# Patient Record
Sex: FEMALE | Race: WHITE | HISPANIC OR LATINO | Employment: OTHER | URBAN - METROPOLITAN AREA
[De-identification: names, ages, dates, MRNs, and addresses within clinical notes are randomized per-mention and may not be internally consistent; named-entity substitution may affect disease eponyms.]

---

## 2019-12-16 ENCOUNTER — TELEPHONE (OUTPATIENT)
Dept: OBGYN CLINIC | Facility: HOSPITAL | Age: 44
End: 2019-12-16

## 2019-12-16 NOTE — TELEPHONE ENCOUNTER
Patient is calling to find out if we received her [facility] referral  Patient is calling to find out if we got the referral but I was told that someone in [facility] check in would call her back     [phone]

## 2019-12-18 ENCOUNTER — OFFICE VISIT (OUTPATIENT)
Dept: OBGYN CLINIC | Facility: CLINIC | Age: 44
End: 2019-12-18
Payer: COMMERCIAL

## 2019-12-18 VITALS — HEART RATE: 106 BPM | DIASTOLIC BLOOD PRESSURE: 103 MMHG | SYSTOLIC BLOOD PRESSURE: 160 MMHG

## 2019-12-18 DIAGNOSIS — S73.192A ACETABULAR LABRUM TEAR, LEFT, INITIAL ENCOUNTER: Primary | ICD-10-CM

## 2019-12-18 PROCEDURE — 99203 OFFICE O/P NEW LOW 30 MIN: CPT | Performed by: ORTHOPAEDIC SURGERY

## 2019-12-18 RX ORDER — DIAZEPAM 10 MG/1
10 TABLET ORAL 3 TIMES DAILY PRN
COMMUNITY
Start: 2019-12-20

## 2019-12-18 RX ORDER — POLYETHYLENE GLYCOL 3350 17 G/17G
POWDER, FOR SOLUTION ORAL
COMMUNITY
Start: 2019-11-20

## 2019-12-18 RX ORDER — DEXTROAMPHETAMINE SACCHARATE, AMPHETAMINE ASPARTATE MONOHYDRATE, DEXTROAMPHETAMINE SULFATE AND AMPHETAMINE SULFATE 5; 5; 5; 5 MG/1; MG/1; MG/1; MG/1
40 CAPSULE, EXTENDED RELEASE ORAL
COMMUNITY
Start: 2019-11-30

## 2019-12-18 RX ORDER — FLUTICASONE FUROATE AND VILANTEROL 200; 25 UG/1; UG/1
1 POWDER RESPIRATORY (INHALATION) DAILY
COMMUNITY
Start: 2019-04-23

## 2019-12-18 RX ORDER — PRAZOSIN HYDROCHLORIDE 5 MG/1
5 CAPSULE ORAL
COMMUNITY
Start: 2019-10-08

## 2019-12-18 RX ORDER — SIMVASTATIN 20 MG
TABLET ORAL
COMMUNITY
Start: 2019-10-22

## 2019-12-18 RX ORDER — TRIAMCINOLONE ACETONIDE 55 UG/1
SPRAY, METERED NASAL
COMMUNITY
Start: 2019-06-24

## 2019-12-18 RX ORDER — MONTELUKAST SODIUM 10 MG/1
TABLET ORAL
COMMUNITY
Start: 2019-10-14 | End: 2020-01-23 | Stop reason: ALTCHOICE

## 2019-12-18 RX ORDER — MIRTAZAPINE 15 MG/1
15 TABLET, FILM COATED ORAL
COMMUNITY
Start: 2019-12-10

## 2019-12-18 RX ORDER — ARIPIPRAZOLE 10 MG/1
10 TABLET ORAL DAILY
COMMUNITY
Start: 2019-12-10

## 2019-12-18 RX ORDER — DOXEPIN HYDROCHLORIDE 100 MG/1
CAPSULE ORAL
COMMUNITY
Start: 2019-12-10

## 2019-12-18 RX ORDER — SIMVASTATIN 20 MG
TABLET ORAL
COMMUNITY
Start: 2019-07-15

## 2019-12-18 RX ORDER — NYSTATIN 100000 [USP'U]/G
POWDER TOPICAL
COMMUNITY
Start: 2019-09-19

## 2019-12-18 RX ORDER — AMLODIPINE BESYLATE 5 MG/1
5 TABLET ORAL DAILY
COMMUNITY
Start: 2019-11-25 | End: 2020-11-24

## 2019-12-18 RX ORDER — TIZANIDINE 4 MG/1
4 TABLET ORAL EVERY 8 HOURS PRN
COMMUNITY
Start: 2019-09-29

## 2019-12-18 RX ORDER — MONTELUKAST SODIUM 10 MG/1
10 TABLET ORAL
COMMUNITY
Start: 2019-07-15 | End: 2020-07-14

## 2019-12-18 RX ORDER — PREDNISONE 10 MG/1
TABLET ORAL
COMMUNITY
Start: 2019-09-20

## 2019-12-18 RX ORDER — LIDOCAINE 50 MG/G
OINTMENT TOPICAL 3 TIMES DAILY PRN
COMMUNITY
Start: 2019-01-11 | End: 2020-01-11

## 2019-12-18 RX ORDER — HYDROCODONE BITARTRATE AND ACETAMINOPHEN 5; 325 MG/1; MG/1
TABLET ORAL
COMMUNITY
Start: 2019-10-24

## 2019-12-18 RX ORDER — IBUPROFEN 800 MG/1
800 TABLET ORAL EVERY 8 HOURS PRN
COMMUNITY
Start: 2019-11-25 | End: 2020-11-24

## 2019-12-18 RX ORDER — GABAPENTIN 300 MG/1
300 CAPSULE ORAL
COMMUNITY
Start: 2019-12-10

## 2019-12-18 RX ORDER — LORATADINE 10 MG/1
TABLET ORAL
COMMUNITY
Start: 2019-07-15

## 2019-12-18 RX ORDER — PRAZOSIN HYDROCHLORIDE 5 MG/1
CAPSULE ORAL
COMMUNITY
Start: 2019-11-21

## 2019-12-18 RX ORDER — ALBUTEROL SULFATE 90 UG/1
2 AEROSOL, METERED RESPIRATORY (INHALATION) EVERY 4 HOURS PRN
COMMUNITY
Start: 2018-05-16

## 2019-12-18 RX ORDER — LIOTHYRONINE SODIUM 5 UG/1
TABLET ORAL
COMMUNITY
Start: 2019-06-13

## 2019-12-18 RX ORDER — GUAIFENESIN 600 MG
600 TABLET, EXTENDED RELEASE 12 HR ORAL 2 TIMES DAILY PRN
COMMUNITY
Start: 2019-01-07

## 2019-12-18 RX ORDER — POLYVINYL ALCOHOL 14 MG/ML
SOLUTION/ DROPS OPHTHALMIC
COMMUNITY
Start: 2019-11-20

## 2019-12-18 NOTE — PROGRESS NOTES
Patient Name:  Renea Pedro  MRN:  [id number]    Assessment & Plan     Left hip moderate DJD, acetabular labral tear  1  Referral to Dr Eyal Allred for definitive treatment  2  Continue ibuprofen 800 mg TID  3  Continue in-home PT/OT  4  Follow-up with Dr Umberto Dumont as needed    Chief Complaint     Left hip pain    History of the Present Illness     Renea Pedro is a 53 y o  female who reports to the office today for evaluation of her left hip  Patient has been experiencing left hip pain for about two months  She states the pain began after getting out of her car and pivoting her left lower extremity  She states she felt a snap in the hip with associated pain  She states the pain is localized into the groin  She notes significant weakness associated from the pain  She also notes stiffness  She denies instability  She denies numbness and tingling  Pain is worse with all activities  She has been ambulating with crutches  She has been taking ibuprofen 800 mg 3 times a day as well as Norco which has been prescribed by pain management  She has recently initiated in-home physical therapy and occupational therapy  She underwent an MRI as well and is here to review the results  No fevers or chills  Physical Exam     BP (!) 160/103   Pulse (!) 106     Left hip:  No gross deformity  No tenderness to palpation  Range of motion is intact and limited in all planes with discomfort and guarding  Positive FADDIR and KWASI test   Positive logroll test   Positive straight leg raise and resisted straight leg raise test   Sensation intact left lower extremity  Skin warm and well perfused  Eyes: No scleral icterus  Neck: Supple  Lungs: Normal respiratory effort  Cardiovascular: Capillary refill is less than 2 seconds  Skin: Intact without erythema  Neurologic: Sensation intact to light touch  Psychiatric: Mood and affect are appropriate      Data Review     I have personally reviewed pertinent films in PACS, and my interpretation follows:    Noncontrast MRI of the left hip performed 11/16/19 reveals moderate degenerative changes about the hip joint with evidence of a possible complex tear of the anterior labrum      Past Medical History:   Diagnosis Date    Hypertension        Past Surgical History:   Procedure Laterality Date    ANKLE SURGERY      BREAST SURGERY      FOOT SURGERY      KNEE SURGERY      WRIST SURGERY         No Known Allergies    Current Outpatient Medications on File Prior to Visit   Medication Sig Dispense Refill    albuterol (PROVENTIL HFA,VENTOLIN HFA) 90 mcg/act inhaler Inhale 2 puffs every 4 (four) hours as needed      amLODIPine (NORVASC) 5 mg tablet Take 5 mg by mouth daily      amphetamine-dextroamphetamine (ADDERALL XR) 20 MG 24 hr capsule Take 40 mg by mouth      ARIPiprazole (ABILIFY) 10 mg tablet Take 10 mg by mouth daily      doxepin (SINEquan) 100 mg capsule Take 1 tab at bed      fluticasone-vilanterol (BREO ELLIPTA) 200-25 MCG/INH inhaler Inhale 1 puff daily      gabapentin (NEURONTIN) 300 mg capsule Take 300 mg by mouth      guaiFENesin (MUCINEX) 600 mg 12 hr tablet Take 600 mg by mouth 2 (two) times a day as needed      ibuprofen (MOTRIN) 800 mg tablet Take 800 mg by mouth every 8 (eight) hours as needed      lidocaine (XYLOCAINE) 5 % ointment Apply topically Three times daily as needed      liothyronine (CYTOMEL) 5 mcg tablet TWO TABLETS BY MOUTH DAILY      loratadine (CLARITIN) 10 mg tablet One tab po daily      mirtazapine (REMERON) 15 mg tablet Take 15 mg by mouth      montelukast (SINGULAIR) 10 mg tablet Take 10 mg by mouth      prazosin (MINIPRESS) 5 mg capsule Take 5 mg by mouth      simvastatin (ZOCOR) 20 mg tablet ONE TABLET BY MOUTH DAILY      tiZANidine (ZANAFLEX) 4 mg tablet Take 4 mg by mouth every 8 (eight) hours as needed      Triamcinolone Acetonide 55 MCG/ACT AERO       vortioxetine (TRINTELLIX) 10 MG tablet Take 1 tablet twice daily  ARTIFICIAL TEARS 1 4 % ophthalmic solution       [START ON 12/20/2019] diazepam (VALIUM) 10 mg tablet Take 10 mg by mouth Three times daily as needed      HYDROcodone-acetaminophen (NORCO) 5-325 mg per tablet       montelukast (SINGULAIR) 10 mg tablet       NYSTATIN powder       polyethylene glycol (MIRALAX) 17 g packet       prazosin (MINIPRESS) 5 mg capsule       predniSONE 10 mg tablet       simvastatin (ZOCOR) 20 mg tablet        No current facility-administered medications on file prior to visit  Social History     Tobacco Use    Smoking status: Former Smoker    Smokeless tobacco: Never Used   Substance Use Topics    Alcohol use: Not on file    Drug use: Not on file       Family History   Problem Relation Age of Onset    Cancer Mother     Hypertension Mother     Aneurysm Father     Heart disease Maternal Grandfather     Heart disease Paternal Grandfather        Review of Systems     As stated in the HPI  All other systems were reviewed and are negative        Scribe Attestation    I,:   Fredrick Wu PA-C am acting as a scribe while in the presence of the attending physician :        I,:   Deny Araiza MD personally performed the services described in this documentation    as scribed in my presence :

## 2020-01-21 ENCOUNTER — OFFICE VISIT (OUTPATIENT)
Dept: OBGYN CLINIC | Facility: MEDICAL CENTER | Age: 45
End: 2020-01-21
Payer: COMMERCIAL

## 2020-01-21 VITALS
DIASTOLIC BLOOD PRESSURE: 77 MMHG | BODY MASS INDEX: 46.16 KG/M2 | WEIGHT: 229 LBS | SYSTOLIC BLOOD PRESSURE: 109 MMHG | HEIGHT: 59 IN | HEART RATE: 109 BPM

## 2020-01-21 DIAGNOSIS — M16.12 PRIMARY OSTEOARTHRITIS OF ONE HIP, LEFT: Primary | ICD-10-CM

## 2020-01-21 DIAGNOSIS — S73.192A ACETABULAR LABRUM TEAR, LEFT, INITIAL ENCOUNTER: ICD-10-CM

## 2020-01-21 PROCEDURE — 99213 OFFICE O/P EST LOW 20 MIN: CPT | Performed by: ORTHOPAEDIC SURGERY

## 2020-01-21 NOTE — PROGRESS NOTES
Orthopaedic Surgery - Office Note  Chayito Chavez (46 y o  female)   : 1975   MRN: [id number]  Encounter Date: 2020    Chief Complaint   Patient presents with    Left Hip - Pain       Assessment / Plan  Left hip osteoarthritis, left hip labral tear    · Continue weight loss program  · I do not recommend a left hip arthroscopy at this time  I do not feel it will provided adequate relief of her pain symptoms and is will be unreliable  I do however recommend a left total hip arthroplasty  · Explained she needs to try to get her BMI under 40 if not her case can be sent to a review board  · I would like the patient to follow up with one of my colleagues Dr Keeley Lock or Dr Stepyh Hunter   Return if symptoms worsen or fail to improve  History of Present Illness  Ilia James is a 53 y o  female who presents to the office today for evaluation of her left hip  Patient was referred here today by Dr Aisha Martínez  Patient states she has been having pain for awhile  She states the pain worsened 10/21/19 after getting out of her car and pivoting her left lower extremity  She states she felt a snap in the hip with associated pain  She states the pain is localized into the groin and anterior thigh  She notes significant weakness associated from the pain  She also notes stiffness  She complains of locking and snapping  Pain is worse with all activities  Patient complains of night time pain  She has been ambulating with a cane  She is currently in a weight mgmt program   Patient states she has seen about 10 physician and cannot get a straight answer as to what she need to do about her hip pain  Review of Systems  Pertinent items are noted in HPI  All other systems were reviewed and are negative  Physical Exam  /77   Pulse (!) 109   Ht 4' 11" (1 499 m)   Wt 104 kg (229 lb)   BMI 46 25 kg/m²   Cons: Appears well  No apparent distress  Psych: Alert  Oriented x3    Mood and affect normal   Eyes: PERRLA, EOMI  Resp: Normal effort  No audible wheezing or stridor  CV: Palpable pulse  No discernable arrhythmia  No LE edema  Lymph:  No palpable cervical, axillary, or inguinal lymphadenopathy  Skin: Warm  No palpable masses  No visible lesions  Neuro: Normal muscle tone  Normal and symmetric DTR's  Left Hip Exam  Alignment / Posture:  Normal resting hip posture  Inspection:  No swelling  No edema  No erythema  No ecchymosis  Palpation:  No tenderness  ROM:  Hip Flexion 60  Hip ER 20  Hip IR 20  Strength:  Not tested  Stability:  No objective hip instability  Tests:  (+) Stinchfield  hip pain with SLR  Neurovascular:  Sensation intact in DP/SP/Bey/Sa/T nerve distributions  2+ DP & PT pulses  Gait:  Antalgic  Studies Reviewed  MRI of left hip - moderate degenerative changes, possible complex tear anterior labrum    Procedures  No procedures today  Medical, Surgical, Family, and Social History  The patient's medical history, family history, and social history, were reviewed and updated as appropriate      Past Medical History:   Diagnosis Date    Hypertension        Past Surgical History:   Procedure Laterality Date    ANKLE SURGERY      BREAST SURGERY      FOOT SURGERY      KNEE SURGERY      WRIST SURGERY         Family History   Problem Relation Age of Onset    Cancer Mother     Hypertension Mother     Aneurysm Father     Heart disease Maternal Grandfather     Heart disease Paternal Grandfather        Social History     Occupational History    Not on file   Tobacco Use    Smoking status: Former Smoker    Smokeless tobacco: Never Used   Substance and Sexual Activity    Alcohol use: Not on file    Drug use: Not on file    Sexual activity: Not on file       Allergies   Allergen Reactions    Carbamazepine Other (See Comments)     "facial droop"    Flunisolide Other (See Comments)     "tongue swelled"    Fluoxetine Other (See Comments)     "more suicidal"    Fluvoxamine      Other reaction(s): Unknown Reaction    Penicillins Other (See Comments)    Sulfamethoxazole-Trimethoprim Other (See Comments)     "rash T extremely high fever"    Tamsulosin      Med has a small amt of sulfur in it     Lamotrigine Rash    Oxybutynin Rash         Current Outpatient Medications:     albuterol (PROVENTIL HFA,VENTOLIN HFA) 90 mcg/act inhaler, Inhale 2 puffs every 4 (four) hours as needed, Disp: , Rfl:     amLODIPine (NORVASC) 5 mg tablet, Take 5 mg by mouth daily, Disp: , Rfl:     amphetamine-dextroamphetamine (ADDERALL XR) 20 MG 24 hr capsule, Take 40 mg by mouth, Disp: , Rfl:     ARIPiprazole (ABILIFY) 10 mg tablet, Take 10 mg by mouth daily, Disp: , Rfl:     ARTIFICIAL TEARS 1 4 % ophthalmic solution, , Disp: , Rfl:     diazepam (VALIUM) 10 mg tablet, Take 10 mg by mouth Three times daily as needed, Disp: , Rfl:     doxepin (SINEquan) 100 mg capsule, Take 1 tab at bed, Disp: , Rfl:     fluticasone-vilanterol (BREO ELLIPTA) 200-25 MCG/INH inhaler, Inhale 1 puff daily, Disp: , Rfl:     gabapentin (NEURONTIN) 300 mg capsule, Take 300 mg by mouth, Disp: , Rfl:     guaiFENesin (MUCINEX) 600 mg 12 hr tablet, Take 600 mg by mouth 2 (two) times a day as needed, Disp: , Rfl:     HYDROcodone-acetaminophen (NORCO) 5-325 mg per tablet, , Disp: , Rfl:     ibuprofen (MOTRIN) 800 mg tablet, Take 800 mg by mouth every 8 (eight) hours as needed, Disp: , Rfl:     liothyronine (CYTOMEL) 5 mcg tablet, TWO TABLETS BY MOUTH DAILY, Disp: , Rfl:     loratadine (CLARITIN) 10 mg tablet, One tab po daily, Disp: , Rfl:     mirtazapine (REMERON) 15 mg tablet, Take 15 mg by mouth, Disp: , Rfl:     montelukast (SINGULAIR) 10 mg tablet, Take 10 mg by mouth, Disp: , Rfl:     montelukast (SINGULAIR) 10 mg tablet, , Disp: , Rfl:     NYSTATIN powder, , Disp: , Rfl:     polyethylene glycol (MIRALAX) 17 g packet, , Disp: , Rfl:     prazosin (MINIPRESS) 5 mg capsule, Take 5 mg by mouth, Disp: , Rfl:     prazosin (MINIPRESS) 5 mg capsule, , Disp: , Rfl:     predniSONE 10 mg tablet, , Disp: , Rfl:     simvastatin (ZOCOR) 20 mg tablet, ONE TABLET BY MOUTH DAILY, Disp: , Rfl:     simvastatin (ZOCOR) 20 mg tablet, , Disp: , Rfl:     tiZANidine (ZANAFLEX) 4 mg tablet, Take 4 mg by mouth every 8 (eight) hours as needed, Disp: , Rfl:     Triamcinolone Acetonide 55 MCG/ACT AERO, , Disp: , Rfl:     vortioxetine (TRINTELLIX) 10 MG tablet, Take 1 tablet twice daily, Disp: , Rfl:     lidocaine (XYLOCAINE) 5 % ointment, Apply topically Three times daily as needed, Disp: , Rfl:       Isreal Boyd    I,:   Caty Diamond am acting as a scribe while in the presence of the attending physician :        I,:   Dave Gilliam MD personally performed the services described in this documentation    as scribed in my presence :

## 2020-01-23 ENCOUNTER — OFFICE VISIT (OUTPATIENT)
Dept: OBGYN CLINIC | Facility: MEDICAL CENTER | Age: 45
End: 2020-01-23
Payer: COMMERCIAL

## 2020-01-23 ENCOUNTER — APPOINTMENT (OUTPATIENT)
Dept: RADIOLOGY | Facility: MEDICAL CENTER | Age: 45
End: 2020-01-23
Payer: COMMERCIAL

## 2020-01-23 VITALS
HEIGHT: 59 IN | DIASTOLIC BLOOD PRESSURE: 79 MMHG | HEART RATE: 94 BPM | BODY MASS INDEX: 46.16 KG/M2 | SYSTOLIC BLOOD PRESSURE: 112 MMHG | WEIGHT: 229 LBS

## 2020-01-23 DIAGNOSIS — M16.12 PRIMARY OSTEOARTHRITIS OF ONE HIP, LEFT: Primary | ICD-10-CM

## 2020-01-23 DIAGNOSIS — M16.12 PRIMARY OSTEOARTHRITIS OF ONE HIP, LEFT: ICD-10-CM

## 2020-01-23 DIAGNOSIS — S73.192A ACETABULAR LABRUM TEAR, LEFT, INITIAL ENCOUNTER: ICD-10-CM

## 2020-01-23 PROCEDURE — 99214 OFFICE O/P EST MOD 30 MIN: CPT | Performed by: ORTHOPAEDIC SURGERY

## 2020-01-23 PROCEDURE — 73502 X-RAY EXAM HIP UNI 2-3 VIEWS: CPT

## 2020-01-23 NOTE — PROGRESS NOTES
Assessment/Plan     1  Primary osteoarthritis of one hip, left    2  Acetabular labrum tear, left, initial encounter      Orders Placed This Encounter   Procedures    XR hip/pelv 2-3 vws left if performed    Ambulatory referral to Pain Management     · Patient is currently not a surgical candidate for left BRIDGER due to her BMI being 46 75  Patient's goal weight prior to surgery is 195 lbs  Her current weight is 229  Patient is working with weight management at [facility] and has lost 20 lbs so far  Patient is considering bariatric surgery  · Pain management for left hip   · Continue physical therapy   · Continue with current pain regimen     Return in about 4 weeks (around 2/20/2020) for Recheck Left hip   I answered all of the patient's questions during the visit and provided education of the patient's condition during the visit  The patient verbalized understanding of the information given and agrees with the plan  This note was dictated using [employer] software  It may contain errors including improperly dictated words  Please contact physician directly for any questions  History of Present Illness   Chief complaint:   Chief Complaint   Patient presents with    Left Hip - Pain       HPI: Abdullahi Beltrán is a 53 y o  female that c/o left hip pain  Patient states on 10/21/19 she was getting out of her car and pivoted with her left leg and felt a snap in the left leg and fell to the ground  Patient states she went to urgent care and the ER and both states no acute fractures left hip on x-rays  Patient was seen by Dr Duyen West at [facility] and states he could not do surgery being overweight  Patient was seen by Dr Sandhya Fontana and had Left IA hip steroid injection on 11/6/19 and states she had no releif  She also had left hip bursitis injection on 12/6/19 with no relief  She states she is having constant sharp stabbing pain in the left groin radiating down to the mid anterior thigh   She feels pain is causing weakness in the left leg  She states her Right leg is shorter than the left and is wearing a shoe insert in the right shoe  She does feel snapping and popping when walking  Pain is worse with weight bearing and increase activity  She is currently treating with Dr Dennis Sanchez pain management for her thoracic spine and is prescribed Norco 5/325  2-3 times a day and she is also taking  mg 3 x a day  Patient is currently working with pain management at [facility] and states she has lost 20 lb so far  She is working toward bariatric surgery  Patient states she is having trouble losing weight due to being o medications  psychiatric medications  Patient is currently physical therapy for her left hip  Patient has no history of having any surgeries on the left hip  Patient had Left TKA in 2013 by Dr Rosanne Li at [facility]  ROS:    See HPI for musculoskeletal review     All other systems reviewed are negative     Historical Information   Past Medical History:   Diagnosis Date    Hypertension      Past Surgical History:   Procedure Laterality Date    ANKLE SURGERY      BREAST SURGERY      FOOT SURGERY      KNEE SURGERY      WRIST SURGERY       Social History   Social History     Substance and Sexual Activity   Alcohol Use Not on file     Social History     Substance and Sexual Activity   Drug Use Not on file     Social History     Tobacco Use   Smoking Status Former Smoker   Smokeless Tobacco Never Used     Family History:   Family History   Problem Relation Age of Onset    Cancer Mother     Hypertension Mother     Aneurysm Father     Heart disease Maternal Grandfather     Heart disease Paternal Grandfather        Current Outpatient Medications on File Prior to Visit   Medication Sig Dispense Refill    amLODIPine (NORVASC) 5 mg tablet Take 5 mg by mouth daily      amphetamine-dextroamphetamine (ADDERALL XR) 20 MG 24 hr capsule Take 40 mg by mouth      ARIPiprazole (ABILIFY) 10 mg tablet Take 10 mg by mouth daily      ARTIFICIAL TEARS 1 4 % ophthalmic solution       diazepam (VALIUM) 10 mg tablet Take 10 mg by mouth Three times daily as needed      doxepin (SINEquan) 100 mg capsule Take 1 tab at bed      fluticasone-vilanterol (BREO ELLIPTA) 200-25 MCG/INH inhaler Inhale 1 puff daily      gabapentin (NEURONTIN) 300 mg capsule Take 300 mg by mouth      guaiFENesin (MUCINEX) 600 mg 12 hr tablet Take 600 mg by mouth 2 (two) times a day as needed      HYDROcodone-acetaminophen (NORCO) 5-325 mg per tablet       ibuprofen (MOTRIN) 800 mg tablet Take 800 mg by mouth every 8 (eight) hours as needed      liothyronine (CYTOMEL) 5 mcg tablet TWO TABLETS BY MOUTH DAILY      loratadine (CLARITIN) 10 mg tablet One tab po daily      mirtazapine (REMERON) 15 mg tablet Take 15 mg by mouth      montelukast (SINGULAIR) 10 mg tablet Take 10 mg by mouth      NYSTATIN powder       prazosin (MINIPRESS) 5 mg capsule Take 5 mg by mouth      prazosin (MINIPRESS) 5 mg capsule       simvastatin (ZOCOR) 20 mg tablet ONE TABLET BY MOUTH DAILY      tiZANidine (ZANAFLEX) 4 mg tablet Take 4 mg by mouth every 8 (eight) hours as needed      vortioxetine (TRINTELLIX) 10 MG tablet Take 1 tablet twice daily      albuterol (PROVENTIL HFA,VENTOLIN HFA) 90 mcg/act inhaler Inhale 2 puffs every 4 (four) hours as needed      lidocaine (XYLOCAINE) 5 % ointment Apply topically Three times daily as needed      polyethylene glycol (MIRALAX) 17 g packet       predniSONE 10 mg tablet       simvastatin (ZOCOR) 20 mg tablet       Triamcinolone Acetonide 55 MCG/ACT AERO       [DISCONTINUED] montelukast (SINGULAIR) 10 mg tablet        No current facility-administered medications on file prior to visit        Allergies   Allergen Reactions    Carbamazepine Other (See Comments)     "facial droop"    Flunisolide Other (See Comments)     "tongue swelled"    Fluoxetine Other (See Comments)     "more suicidal"    Fluvoxamine      Other reaction(s): Unknown Reaction    Penicillins Other (See Comments)    Sulfamethoxazole-Trimethoprim Other (See Comments)     "rash T extremely high fever"    Tamsulosin      Med has a small amt of sulfur in it     Lamotrigine Rash    Oxybutynin Rash       Objective   Vitals: Blood pressure 112/79, pulse 94, height 4' 11" (1 499 m), weight 104 kg (229 lb)  ,Body mass index is 46 25 kg/m²  PE:  AAOx 3  WDWN  Hearing intact, no drainage from eyes  Regular rate  no audible wheezing  no abdominal distension  LE compartments soft, skin intact    left hip:   No dislocation/deformity  Neg   Straight leg raise   ROM: flexion 0-85/ ER 0-30/ IR 0  Leg lengths appear equal     bilateralLE:    LLE:  EHL/AT/GS/quads/hamstrings/iliopsoas 5/5, sensation grossly intact L4, L5, S1, palpable pedal pulse  RLE:  EHL/AT/GS/quads/hamstrings/iliopsoas 5/5, sensation grossly intact L4, L5, S1, palpable pedal pulse      Imaging Studies: I have personally reviewed pertinent films in PACS  lefthip:  Moderate to Severe DJD       Scribe Attestation    I,:   Tania Etienne am acting as a scribe while in the presence of the attending physician :        I,:   Jose Gonzales, DO personally performed the services described in this documentation    as scribed in my presence :

## 2020-02-04 ENCOUNTER — TELEPHONE (OUTPATIENT)
Dept: OBGYN CLINIC | Facility: MEDICAL CENTER | Age: 45
End: 2020-02-04

## 2020-02-04 NOTE — TELEPHONE ENCOUNTER
TENS unit requires paper script  Spoke with patient and notified her that I will write the order on Thursday when I am back in the office  It can then be faxed to [facility]  I will call her back when it has been faxed  She was very appreciative and had no other questions

## 2020-02-04 NOTE — TELEPHONE ENCOUNTER
Dr Calles   Cb#: [phone]           Patient is calling in requesting a tens unit order  She stated her PT is recommending because it's helping with the pain  She would like the order to be faxed to [facility]  Fax# [phone] att: Simran Denny  Cb#: [phone]   Please advise, thank you

## 2020-02-06 NOTE — TELEPHONE ENCOUNTER
Order for TENS unit has been faxed [phone] to [facility] attention: Emily Chakraborty approximately 1pm today

## 2022-12-07 ENCOUNTER — HOSPITAL ENCOUNTER (EMERGENCY)
Facility: HOSPITAL | Age: 47
End: 2022-12-10
Attending: EMERGENCY MEDICINE

## 2022-12-07 DIAGNOSIS — R45.851 SUICIDAL IDEATION: ICD-10-CM

## 2022-12-07 DIAGNOSIS — F32.A DEPRESSION: Primary | ICD-10-CM

## 2022-12-07 DIAGNOSIS — Z00.8 MEDICAL CLEARANCE FOR PSYCHIATRIC ADMISSION: ICD-10-CM

## 2022-12-07 LAB
AMPHETAMINES SERPL QL SCN: POSITIVE
BARBITURATES UR QL: NEGATIVE
BENZODIAZ UR QL: POSITIVE
COCAINE UR QL: NEGATIVE
ETHANOL EXG-MCNC: 0 MG/DL
EXT PREGNANCY TEST URINE: NEGATIVE
EXT. CONTROL: NORMAL
METHADONE UR QL: NEGATIVE
OPIATES UR QL SCN: POSITIVE
OXYCODONE+OXYMORPHONE UR QL SCN: NEGATIVE
PCP UR QL: NEGATIVE
SARS-COV-2 RNA RESP QL NAA+PROBE: NEGATIVE
THC UR QL: POSITIVE

## 2022-12-07 RX ORDER — DEXTROAMPHETAMINE SACCHARATE, AMPHETAMINE ASPARTATE MONOHYDRATE, DEXTROAMPHETAMINE SULFATE AND AMPHETAMINE SULFATE 2.5; 2.5; 2.5; 2.5 MG/1; MG/1; MG/1; MG/1
40 CAPSULE, EXTENDED RELEASE ORAL EVERY MORNING
COMMUNITY
End: 2022-12-12

## 2022-12-07 RX ORDER — HALOPERIDOL 5 MG/ML
5 INJECTION INTRAMUSCULAR ONCE
Status: DISCONTINUED | OUTPATIENT
Start: 2022-12-07 | End: 2022-12-10 | Stop reason: HOSPADM

## 2022-12-07 RX ORDER — DIAZEPAM 5 MG/1
10 TABLET ORAL EVERY 8 HOURS PRN
Status: DISCONTINUED | OUTPATIENT
Start: 2022-12-07 | End: 2022-12-10 | Stop reason: HOSPADM

## 2022-12-07 RX ORDER — PRAZOSIN HYDROCHLORIDE 2 MG/1
10 CAPSULE ORAL
Status: DISCONTINUED | OUTPATIENT
Start: 2022-12-07 | End: 2022-12-10 | Stop reason: HOSPADM

## 2022-12-07 RX ORDER — MONTELUKAST SODIUM 10 MG/1
10 TABLET ORAL
Status: DISCONTINUED | OUTPATIENT
Start: 2022-12-07 | End: 2022-12-10 | Stop reason: HOSPADM

## 2022-12-07 RX ORDER — ESTRADIOL 1 MG/1
1 TABLET ORAL DAILY
Status: DISCONTINUED | OUTPATIENT
Start: 2022-12-08 | End: 2022-12-08

## 2022-12-07 RX ORDER — LORAZEPAM 2 MG/ML
2 INJECTION INTRAMUSCULAR ONCE
Status: DISCONTINUED | OUTPATIENT
Start: 2022-12-07 | End: 2022-12-10 | Stop reason: HOSPADM

## 2022-12-07 RX ORDER — LIOTHYRONINE SODIUM 5 UG/1
10 TABLET ORAL DAILY
Status: DISCONTINUED | OUTPATIENT
Start: 2022-12-08 | End: 2022-12-10 | Stop reason: HOSPADM

## 2022-12-07 RX ORDER — SIMVASTATIN 20 MG
20 TABLET ORAL
Status: DISCONTINUED | OUTPATIENT
Start: 2022-12-07 | End: 2022-12-07 | Stop reason: RX

## 2022-12-07 RX ORDER — GABAPENTIN 300 MG/1
600 CAPSULE ORAL
Status: DISCONTINUED | OUTPATIENT
Start: 2022-12-07 | End: 2022-12-10 | Stop reason: HOSPADM

## 2022-12-07 RX ORDER — GABAPENTIN 300 MG/1
300 CAPSULE ORAL EVERY MORNING
Status: DISCONTINUED | OUTPATIENT
Start: 2022-12-08 | End: 2022-12-10 | Stop reason: HOSPADM

## 2022-12-07 RX ORDER — SIMVASTATIN 20 MG
20 TABLET ORAL
Status: DISCONTINUED | OUTPATIENT
Start: 2022-12-07 | End: 2022-12-07

## 2022-12-07 RX ORDER — SENNA AND DOCUSATE SODIUM 50; 8.6 MG/1; MG/1
1 TABLET, FILM COATED ORAL 2 TIMES DAILY PRN
COMMUNITY
End: 2022-12-12

## 2022-12-07 RX ORDER — CELECOXIB 200 MG/1
200 CAPSULE ORAL 2 TIMES DAILY
Status: DISCONTINUED | OUTPATIENT
Start: 2022-12-07 | End: 2022-12-10 | Stop reason: HOSPADM

## 2022-12-07 RX ORDER — DOXEPIN HYDROCHLORIDE 50 MG/1
100 CAPSULE ORAL
Status: DISCONTINUED | OUTPATIENT
Start: 2022-12-07 | End: 2022-12-10 | Stop reason: HOSPADM

## 2022-12-07 RX ORDER — DEXTROAMPHETAMINE SACCHARATE, AMPHETAMINE ASPARTATE, DEXTROAMPHETAMINE SULFATE AND AMPHETAMINE SULFATE 2.5; 2.5; 2.5; 2.5 MG/1; MG/1; MG/1; MG/1
10 TABLET ORAL
Status: DISCONTINUED | OUTPATIENT
Start: 2022-12-08 | End: 2022-12-09

## 2022-12-07 RX ORDER — LORATADINE 10 MG/1
10 TABLET ORAL
Status: DISCONTINUED | OUTPATIENT
Start: 2022-12-07 | End: 2022-12-10 | Stop reason: HOSPADM

## 2022-12-07 RX ORDER — BENZTROPINE MESYLATE 1 MG/ML
1 INJECTION INTRAMUSCULAR; INTRAVENOUS ONCE
Status: DISCONTINUED | OUTPATIENT
Start: 2022-12-07 | End: 2022-12-10 | Stop reason: HOSPADM

## 2022-12-07 RX ORDER — DEXTROAMPHETAMINE SACCHARATE, AMPHETAMINE ASPARTATE MONOHYDRATE, DEXTROAMPHETAMINE SULFATE AND AMPHETAMINE SULFATE 5; 5; 5; 5 MG/1; MG/1; MG/1; MG/1
20 CAPSULE, EXTENDED RELEASE ORAL DAILY
Status: DISCONTINUED | OUTPATIENT
Start: 2022-12-07 | End: 2022-12-07 | Stop reason: SDUPTHER

## 2022-12-07 RX ORDER — GABAPENTIN 300 MG/1
600 CAPSULE ORAL
Status: DISCONTINUED | OUTPATIENT
Start: 2022-12-07 | End: 2022-12-07

## 2022-12-07 RX ORDER — DEXTROAMPHETAMINE SACCHARATE, AMPHETAMINE ASPARTATE MONOHYDRATE, DEXTROAMPHETAMINE SULFATE AND AMPHETAMINE SULFATE 2.5; 2.5; 2.5; 2.5 MG/1; MG/1; MG/1; MG/1
10 CAPSULE, EXTENDED RELEASE ORAL EVERY MORNING
Status: DISCONTINUED | OUTPATIENT
Start: 2022-12-08 | End: 2022-12-07 | Stop reason: RX

## 2022-12-07 RX ORDER — ESTRADIOL 1 MG/1
1 TABLET ORAL DAILY
COMMUNITY
End: 2022-12-12

## 2022-12-07 RX ORDER — ESTRADIOL 1 MG/1
2 TABLET ORAL DAILY
Status: DISCONTINUED | OUTPATIENT
Start: 2022-12-07 | End: 2022-12-10 | Stop reason: HOSPADM

## 2022-12-07 RX ORDER — LIOTHYRONINE SODIUM 5 UG/1
5 TABLET ORAL DAILY
Status: DISCONTINUED | OUTPATIENT
Start: 2022-12-07 | End: 2022-12-08 | Stop reason: SDUPTHER

## 2022-12-07 RX ORDER — AMOXICILLIN 250 MG
1 CAPSULE ORAL 2 TIMES DAILY PRN
Status: DISCONTINUED | OUTPATIENT
Start: 2022-12-07 | End: 2022-12-09

## 2022-12-07 RX ORDER — HALOPERIDOL 5 MG/ML
5 INJECTION INTRAMUSCULAR ONCE
Status: COMPLETED | OUTPATIENT
Start: 2022-12-07 | End: 2022-12-07

## 2022-12-07 RX ORDER — PRAVASTATIN SODIUM 40 MG
40 TABLET ORAL
Status: DISCONTINUED | OUTPATIENT
Start: 2022-12-08 | End: 2022-12-10 | Stop reason: HOSPADM

## 2022-12-07 RX ORDER — DOXEPIN HYDROCHLORIDE 50 MG/1
100 CAPSULE ORAL
Status: DISCONTINUED | OUTPATIENT
Start: 2022-12-07 | End: 2022-12-07

## 2022-12-07 RX ORDER — DEXTROAMPHETAMINE SACCHARATE, AMPHETAMINE ASPARTATE MONOHYDRATE, DEXTROAMPHETAMINE SULFATE AND AMPHETAMINE SULFATE 2.5; 2.5; 2.5; 2.5 MG/1; MG/1; MG/1; MG/1
40 CAPSULE, EXTENDED RELEASE ORAL EVERY MORNING
Status: DISCONTINUED | OUTPATIENT
Start: 2022-12-08 | End: 2022-12-07

## 2022-12-07 RX ORDER — HYDROCODONE BITARTRATE AND ACETAMINOPHEN 5; 325 MG/1; MG/1
1 TABLET ORAL EVERY 6 HOURS PRN
Status: DISCONTINUED | OUTPATIENT
Start: 2022-12-07 | End: 2022-12-08

## 2022-12-07 RX ADMIN — HALOPERIDOL LACTATE 5 MG: 5 INJECTION, SOLUTION INTRAMUSCULAR at 19:09

## 2022-12-07 RX ADMIN — DIAZEPAM 10 MG: 5 TABLET ORAL at 17:27

## 2022-12-07 RX ADMIN — HYDROCODONE BITARTRATE AND ACETAMINOPHEN 1 TABLET: 5; 325 TABLET ORAL at 16:18

## 2022-12-07 RX ADMIN — ESTRADIOL 2 MG: 1 TABLET ORAL at 23:40

## 2022-12-07 RX ADMIN — CELECOXIB 200 MG: 200 CAPSULE ORAL at 23:41

## 2022-12-07 RX ADMIN — GABAPENTIN 600 MG: 300 CAPSULE ORAL at 22:12

## 2022-12-07 RX ADMIN — MONTELUKAST SODIUM 10 MG: 10 TABLET, COATED ORAL at 23:00

## 2022-12-07 RX ADMIN — PRAZOSIN HYDROCHLORIDE 10 MG: 2 CAPSULE ORAL at 23:00

## 2022-12-07 RX ADMIN — LORATADINE 10 MG: 10 TABLET ORAL at 23:00

## 2022-12-07 RX ADMIN — HYDROCODONE BITARTRATE AND ACETAMINOPHEN 1 TABLET: 5; 325 TABLET ORAL at 23:00

## 2022-12-07 RX ADMIN — SENNOSIDES AND DOCUSATE SODIUM 1 TABLET: 8.6; 5 TABLET ORAL at 22:12

## 2022-12-07 RX ADMIN — DOXEPIN HYDROCHLORIDE 100 MG: 50 CAPSULE ORAL at 22:13

## 2022-12-07 RX ADMIN — LIOTHYRONINE SODIUM 5 MCG: 5 TABLET ORAL at 23:41

## 2022-12-07 NOTE — ED NOTES
Per Chidi Hill pt okay to have blanket, bear and leave sports bra on        Manuel Carey, SHANIQUE  12/07/22 [id number]

## 2022-12-07 NOTE — ED NOTES
Per Mckay San Ardo at [address] [facility] Intake- No beds available at this time      Espinoza Triplett  Crisis Intervention Specialist II  12/07/22

## 2022-12-07 NOTE — ED NOTES
Patient requesting to stay local in the [facility]  201 faxed to intake for review
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
## 2022-12-07 NOTE — ED NOTES
Assumed care of patient at this time  Patient is on continual 1:1 observation for SI, patient is eating dinner at this time with patient observation assistant at bedside        Evelyne Sloan RN  12/07/22 5626

## 2022-12-07 NOTE — ED NOTES
Pt reporting "finito" keeps yelling at her and telling her to hurt herself   Pt appearing to be anxious, Eladio SKINNER made aware     Jocelyn Dubose RN  12/07/22 1082

## 2022-12-07 NOTE — ED NOTES
Insurance Authorization:   Phone call placed to [facility]  Phone number: [phone]     Spoke to: Enriqueta Aldridge approved- 3  Level of care: Inpatient treatment  Review on 12/9/22  Authorization # Facility to call on arrival      EVS (Eligibility Verification System) called [phone]  Automated system indicates: [facility]
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
## 2022-12-07 NOTE — ED NOTES
Spoke to patient's therapist, Diana Miller ([phone])  Per Diana Miller patient has DID diagnosis and her alters are currently "out of whack"  Diana Miller notes that one alter is a 10 y/o that can present as very needy, a 16y/o that is often the primary, and a very nasty one that the patient has been trying to keep under control  Dinaa Miller happy patient signed herself in, and feels that she needs stabilization at this time      Jewels Rodgers  Crisis Intervention Specialist II  12/07/22

## 2022-12-07 NOTE — ED NOTES
Coloring books and markers returned     Isac Sarabia, [facility]  12/07/22 9403

## 2022-12-08 RX ORDER — HYDROCODONE BITARTRATE AND ACETAMINOPHEN 5; 325 MG/1; MG/1
1.5 TABLET ORAL EVERY 6 HOURS PRN
Status: DISCONTINUED | OUTPATIENT
Start: 2022-12-08 | End: 2022-12-10 | Stop reason: HOSPADM

## 2022-12-08 RX ORDER — LEVETIRACETAM 250 MG/1
250 TABLET ORAL ONCE
Status: DISCONTINUED | OUTPATIENT
Start: 2022-12-08 | End: 2022-12-08

## 2022-12-08 RX ORDER — TIZANIDINE 4 MG/1
4 TABLET ORAL EVERY 6 HOURS PRN
Status: DISCONTINUED | OUTPATIENT
Start: 2022-12-08 | End: 2022-12-10 | Stop reason: HOSPADM

## 2022-12-08 RX ORDER — GUAIFENESIN 600 MG/1
600 TABLET, EXTENDED RELEASE ORAL EVERY 12 HOURS PRN
Status: DISCONTINUED | OUTPATIENT
Start: 2022-12-08 | End: 2022-12-10 | Stop reason: HOSPADM

## 2022-12-08 RX ORDER — POTASSIUM CHLORIDE 750 MG/1
10 TABLET, EXTENDED RELEASE ORAL 2 TIMES DAILY
Status: DISCONTINUED | OUTPATIENT
Start: 2022-12-08 | End: 2022-12-10 | Stop reason: HOSPADM

## 2022-12-08 RX ORDER — LEVETIRACETAM 250 MG/1
250 TABLET ORAL EVERY 12 HOURS SCHEDULED
Status: DISCONTINUED | OUTPATIENT
Start: 2022-12-08 | End: 2022-12-10 | Stop reason: HOSPADM

## 2022-12-08 RX ORDER — ESTRADIOL 1 MG/1
1.5 TABLET ORAL DAILY
Status: DISCONTINUED | OUTPATIENT
Start: 2022-12-09 | End: 2022-12-10 | Stop reason: HOSPADM

## 2022-12-08 RX ADMIN — LIOTHYRONINE SODIUM 10 MCG: 5 TABLET ORAL at 22:31

## 2022-12-08 RX ADMIN — ESTRADIOL 1 MG: 1 TABLET ORAL at 10:07

## 2022-12-08 RX ADMIN — SENNOSIDES AND DOCUSATE SODIUM 1 TABLET: 8.6; 5 TABLET ORAL at 10:08

## 2022-12-08 RX ADMIN — DOXEPIN HYDROCHLORIDE 100 MG: 50 CAPSULE ORAL at 22:29

## 2022-12-08 RX ADMIN — DIAZEPAM 10 MG: 5 TABLET ORAL at 14:49

## 2022-12-08 RX ADMIN — CELECOXIB 200 MG: 200 CAPSULE ORAL at 19:08

## 2022-12-08 RX ADMIN — LORATADINE 10 MG: 10 TABLET ORAL at 22:30

## 2022-12-08 RX ADMIN — DIAZEPAM 10 MG: 5 TABLET ORAL at 22:30

## 2022-12-08 RX ADMIN — GABAPENTIN 600 MG: 300 CAPSULE ORAL at 22:29

## 2022-12-08 RX ADMIN — ESTRADIOL 2 MG: 1 TABLET ORAL at 20:10

## 2022-12-08 RX ADMIN — SENNOSIDES AND DOCUSATE SODIUM 1 TABLET: 8.6; 5 TABLET ORAL at 22:31

## 2022-12-08 RX ADMIN — TIZANIDINE 4 MG: 4 TABLET ORAL at 18:04

## 2022-12-08 RX ADMIN — GABAPENTIN 300 MG: 300 CAPSULE ORAL at 10:08

## 2022-12-08 RX ADMIN — HYDROCODONE BITARTRATE AND ACETAMINOPHEN 1.5 TABLET: 5; 325 TABLET ORAL at 13:41

## 2022-12-08 RX ADMIN — CELECOXIB 200 MG: 200 CAPSULE ORAL at 10:08

## 2022-12-08 RX ADMIN — PRAVASTATIN SODIUM 40 MG: 40 TABLET ORAL at 18:04

## 2022-12-08 RX ADMIN — PRAZOSIN HYDROCHLORIDE 10 MG: 2 CAPSULE ORAL at 22:31

## 2022-12-08 RX ADMIN — POTASSIUM CHLORIDE 10 MEQ: 750 TABLET, EXTENDED RELEASE ORAL at 22:30

## 2022-12-08 RX ADMIN — DEXTROAMPHETAMINE SACCHARATE, AMPHETAMINE ASPARTATE, DEXTROAMPHETAMINE SULFATE AND AMPHETAMINE SULFATE 10 MG: 2.5; 2.5; 2.5; 2.5 TABLET ORAL at 13:40

## 2022-12-08 RX ADMIN — MONTELUKAST SODIUM 10 MG: 10 TABLET, COATED ORAL at 22:29

## 2022-12-08 RX ADMIN — TIZANIDINE 4 MG: 4 TABLET ORAL at 23:49

## 2022-12-08 RX ADMIN — PRAZOSIN HYDROCHLORIDE 5 MG: 2 CAPSULE ORAL at 10:08
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
## 2022-12-08 NOTE — ED NOTES
Received call back from patient's established therapist- Per patient request provided update to Kendra Blizzard Kendra Blizzard requesting patient call her after she is done with her shower      Kierra Mask  Crisis Intervention Specialist II  12/08/22

## 2022-12-08 NOTE — ED NOTES
Vital signs deferred due to patient resting  Provider is aware  Vital signs will be taken when patient is fully awake       Cherie Skinner RN  12/08/22 7468

## 2022-12-08 NOTE — ED NOTES
Patient sleeping at this time, medications held, provider made aware        Edvin Rowland RN  12/07/22 2055
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
## 2022-12-08 NOTE — ED NOTES
Patient agreeable to manual blood pressure every 8hrs, patient informed the blood pressures on leg are not accurate       Arpit Chou RN  12/08/22 7989

## 2022-12-08 NOTE — ED CARE HANDOFF
Emergency Department Sign Out Note        Sign out and transfer of care from [facility]  See Separate Emergency Department note  The patient, Melina Roberts, was evaluated by the previous provider for psychiatric evaluation and SI without a plan  Workup Completed:    Labs Reviewed   RAPID DRUG SCREEN, URINE - Abnormal       Result Value Ref Range Status    Amph/Meth UR Positive (*) Negative Final    Barbiturate Ur Negative  Negative Final    Benzodiazepine Urine Positive (*) Negative Final    Cocaine Urine Negative  Negative Final    Methadone Urine Negative  Negative Final    Opiate Urine Positive (*) Negative Final    PCP Ur Negative  Negative Final    THC Urine Positive (*) Negative Final    Oxycodone Urine Negative  Negative Final    Narrative:     Presumptive report  If requested, specimen will be sent to reference lab for confirmation  FOR MEDICAL PURPOSES ONLY  IF CONFIRMATION NEEDED PLEASE CONTACT THE LAB WITHIN 5 DAYS  Drug Screen Cutoff Levels:  AMPHETAMINE/METHAMPHETAMINES  1000 ng/mL  BARBITURATES     200 ng/mL  BENZODIAZEPINES     200 ng/mL  COCAINE      300 ng/mL  METHADONE      300 ng/mL  OPIATES      300 ng/mL  PHENCYCLIDINE     25 ng/mL  THC       50 ng/mL  OXYCODONE      100 ng/mL   NOVEL CORONAVIRUS (COVID-19), PCR [facility] - Normal    SARS-CoV-2 Negative  Negative Final    Comment:      Narrative:     FOR PEDIATRIC PATIENTS - copy/paste COVID Guidelines URL to browser: [email or web address] org/  ashx    SARS-CoV-2 assay is a Nucleic Acid Amplification assay intended for the  qualitative detection of nucleic acid from SARS-CoV-2 in nasopharyngeal  swabs  Results are for the presumptive identification of SARS-CoV-2 RNA  Positive results are indicative of infection with SARS-CoV-2, the virus  causing COVID-19, but do not rule out bacterial infection or co-infection  with other viruses   Laboratories within the [place] and its  territories are required to report all positive results to the appropriate  public health authorities  Negative results do not preclude SARS-CoV-2  infection and should not be used as the sole basis for treatment or other  patient management decisions  Negative results must be combined with  clinical observations, patient history, and epidemiological information  This test has not been FDA cleared or approved  This test has been authorized by FDA under an Emergency Use Authorization  (EUA)  This test is only authorized for the duration of time the  declaration that circumstances exist justifying the authorization of the  emergency use of an in vitro diagnostic tests for detection of SARS-CoV-2  virus and/or diagnosis of COVID-19 infection under section 564(b)(1) of  the Act, 21 U  S C  233OMF-2(V)(9), unless the authorization is terminated  or revoked sooner  The test has been validated but independent review by FDA  and CLIA is pending  Test performed using [employer] GeneXpert: This RT-PCR assay targets N2,  a region unique to SARS-CoV-2  A conserved region in the E-gene was chosen  for pan-Sarbecovirus detection which includes SARS-CoV-2  According to CMS-2020-01-R, this platform meets the definition of high-throughput technology  POCT ALCOHOL BREATH TEST - Normal    EXTBreath Alcohol 0 00   Final   POCT PREGNANCY, URINE - Normal    EXT Preg Test, Ur Negative   Final    Control Valid   Final       No orders to display       ED Course / Workup Pending (followup):    SI without a plan  Multiple personality disorder  Received 10 mg valium and 5 halodol IM  Was in restraints earlier but is now out of restraints  Signed a 201, pending placement  Pending pych consult  9325- Pt stable during my time of care  No requests or concerns from patient  Stable at sign out  ED Course as of 12/08/22 0618   u Dec 08, 2022   0036 Sign out from [place], [place]  201 signed pending placement   If pt wants to leave she first needs to be evaluated by psych, per previous provider  1975 Signed out to [address] [place] [address] Katarina Nugent PA-C  201 signed, pending placement and psych consult  Procedures  MDM        Disposition  Final diagnoses:   Depression   Suicidal ideation     Time reflects when diagnosis was documented in both MDM as applicable and the Disposition within this note     Time User Action Codes Description Comment    12/7/2022  2:52 PM Katalina Mancia Add [A65  A] Depression     12/7/2022  2:52 PM Gwen RANDALL Add [L74 846] Suicidal ideation       ED Disposition     ED Disposition   Transfer to [address] [place] [address]   --    Date/Time   Wed Dec 7, 2022  2:52 PM    Comment   Ilia Kearns should be transferred out to behavioral health and has been medically cleared  MD Mansoor Pereira Most Recent Value   Sending MD Dr Kike Chapman    None       Patient's Medications   Discharge Prescriptions    No medications on file     No discharge procedures on file         ED Provider  Electronically Signed by     Monica Hernandez PA-C  12/08/22 7141

## 2022-12-08 NOTE — ED NOTES
Patient given a phone, patient requested to call her therapist and leave a message, this RN stayed at bedside while she made the call        Polly Raymundo RN  12/07/22 7106

## 2022-12-08 NOTE — ED NOTES
Patient provided dressing supplies to redress skin grafts and apply lotion  Provider aware  This RN and [employer], tech at bedside to monitor patient while she redressed her skin grafts        Ofelia Ulrich RN  12/08/22 4250

## 2022-12-08 NOTE — ED NOTES
Spoke with Violette and she says [place] is ok if patient is agreeable  Patient says she wants to go to [facility]  There are no beds at [facility] today per Prince Rodas however they can review for tomorrow  Clinicals sent to [facility] for review

## 2022-12-08 NOTE — ED NOTES
Unable to complete CSSRS Risk Screening due to patient's refusal to answer the screening questions        Reyna Anderson RN  12/07/22 2009

## 2022-12-08 NOTE — ED NOTES
Patient requested call be placed to her therapist at this time  POA reports therapist requested the call via text message sent to the patient  Call placed to Tony Burrell ([phone]), patient's established therapist, left voicemail with call back number and message asking she return the call      Arias Mejia  Crisis Intervention Specialist II  12/08/22
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
## 2022-12-08 NOTE — ED NOTES
Vitals deferred at this time, patient refused intervention and stated she would like to sleep  Provider aware        Marina Harris RN  12/08/22 0371

## 2022-12-08 NOTE — ED NOTES
Patient asked if she had signed a 201, was informed that she did and she is requesting to sign out and call her therapist  Patient is sitting on the edge of the stretcher at this time  Provider made aware        Marina Harris RN  12/07/22 1491

## 2022-12-08 NOTE — ED NOTES
Patient given ipad for virtual tele health appointment with psychiatrist      China Alejandre, RN  12/08/22 1016

## 2022-12-08 NOTE — ED NOTES
Received call from Dinesh Goodson who identified herself as a friend of the patient- Requesting call back from patient at [address] [place] [address]  II  12/08/22

## 2022-12-08 NOTE — ED NOTES
Patient given tooth brush and ambulated well to the bathroom without assistance to brush teeth       Albert Martin RN  12/08/22 1730

## 2022-12-08 NOTE — ED NOTES
BRODY Yates made aware of blood pressure, patient agreed to left us try blood pressure on right arm with manual cuff at this time       China Alejandre RN  12/08/22 3986
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
## 2022-12-08 NOTE — ED NOTES
Bradford Knight [facility] called for update on patient  Reports she will call back in the morning to follow up with the patient      Manuel Celeste  Crisis Intervention Specialist II  12/08/22

## 2022-12-08 NOTE — ED NOTES
Patient still sleeping at this time, respirations equal and non-labored        Ozzie Ang, SHANIQUE  12/07/22 1236

## 2022-12-09 LAB
FLUAV RNA RESP QL NAA+PROBE: NEGATIVE
FLUBV RNA RESP QL NAA+PROBE: NEGATIVE
RSV RNA RESP QL NAA+PROBE: NEGATIVE
SARS-COV-2 RNA RESP QL NAA+PROBE: NEGATIVE

## 2022-12-09 RX ORDER — LORAZEPAM 2 MG/ML
2 INJECTION INTRAMUSCULAR EVERY 6 HOURS PRN
Status: CANCELLED | OUTPATIENT
Start: 2022-12-09

## 2022-12-09 RX ORDER — POLYETHYLENE GLYCOL 3350 17 G/17G
17 POWDER, FOR SOLUTION ORAL ONCE
Status: COMPLETED | OUTPATIENT
Start: 2022-12-09 | End: 2022-12-09

## 2022-12-09 RX ORDER — HYDROXYZINE HYDROCHLORIDE 25 MG/1
25 TABLET, FILM COATED ORAL
Status: CANCELLED | OUTPATIENT
Start: 2022-12-09

## 2022-12-09 RX ORDER — DEXTROAMPHETAMINE SACCHARATE, AMPHETAMINE ASPARTATE, DEXTROAMPHETAMINE SULFATE AND AMPHETAMINE SULFATE 2.5; 2.5; 2.5; 2.5 MG/1; MG/1; MG/1; MG/1
20 TABLET ORAL 2 TIMES DAILY
Status: CANCELLED | OUTPATIENT
Start: 2022-12-09

## 2022-12-09 RX ORDER — MAGNESIUM HYDROXIDE/ALUMINUM HYDROXICE/SIMETHICONE 120; 1200; 1200 MG/30ML; MG/30ML; MG/30ML
30 SUSPENSION ORAL EVERY 4 HOURS PRN
Status: CANCELLED | OUTPATIENT
Start: 2022-12-09

## 2022-12-09 RX ORDER — MONTELUKAST SODIUM 10 MG/1
10 TABLET ORAL
Status: CANCELLED | OUTPATIENT
Start: 2022-12-09

## 2022-12-09 RX ORDER — AMOXICILLIN 250 MG
2 CAPSULE ORAL 2 TIMES DAILY PRN
Status: DISCONTINUED | OUTPATIENT
Start: 2022-12-09 | End: 2022-12-10 | Stop reason: HOSPADM

## 2022-12-09 RX ORDER — ACETAMINOPHEN 325 MG/1
650 TABLET ORAL EVERY 6 HOURS PRN
Status: CANCELLED | OUTPATIENT
Start: 2022-12-09

## 2022-12-09 RX ORDER — PRAZOSIN HYDROCHLORIDE 2 MG/1
10 CAPSULE ORAL
Status: CANCELLED | OUTPATIENT
Start: 2022-12-09

## 2022-12-09 RX ORDER — POLYETHYLENE GLYCOL 3350 17 G/17G
17 POWDER, FOR SOLUTION ORAL DAILY PRN
Status: CANCELLED | OUTPATIENT
Start: 2022-12-09

## 2022-12-09 RX ORDER — DOXEPIN HYDROCHLORIDE 50 MG/1
100 CAPSULE ORAL
Status: CANCELLED | OUTPATIENT
Start: 2022-12-09

## 2022-12-09 RX ORDER — BENZTROPINE MESYLATE 1 MG/ML
1 INJECTION INTRAMUSCULAR; INTRAVENOUS
Status: CANCELLED | OUTPATIENT
Start: 2022-12-09

## 2022-12-09 RX ORDER — BENZTROPINE MESYLATE 1 MG/ML
0.5 INJECTION INTRAMUSCULAR; INTRAVENOUS
Status: CANCELLED | OUTPATIENT
Start: 2022-12-09

## 2022-12-09 RX ORDER — DEXTROAMPHETAMINE SACCHARATE, AMPHETAMINE ASPARTATE, DEXTROAMPHETAMINE SULFATE AND AMPHETAMINE SULFATE 2.5; 2.5; 2.5; 2.5 MG/1; MG/1; MG/1; MG/1
10 TABLET ORAL DAILY
Status: DISCONTINUED | OUTPATIENT
Start: 2022-12-09 | End: 2022-12-10 | Stop reason: HOSPADM

## 2022-12-09 RX ORDER — ACETAMINOPHEN 325 MG/1
975 TABLET ORAL EVERY 6 HOURS PRN
Status: CANCELLED | OUTPATIENT
Start: 2022-12-09

## 2022-12-09 RX ORDER — DEXTROAMPHETAMINE SACCHARATE, AMPHETAMINE ASPARTATE, DEXTROAMPHETAMINE SULFATE AND AMPHETAMINE SULFATE 2.5; 2.5; 2.5; 2.5 MG/1; MG/1; MG/1; MG/1
10 TABLET ORAL DAILY
Status: CANCELLED | OUTPATIENT
Start: 2022-12-09

## 2022-12-09 RX ORDER — ACETAMINOPHEN 325 MG/1
650 TABLET ORAL EVERY 4 HOURS PRN
Status: CANCELLED | OUTPATIENT
Start: 2022-12-09

## 2022-12-09 RX ORDER — TRAZODONE HYDROCHLORIDE 50 MG/1
50 TABLET ORAL
Status: CANCELLED | OUTPATIENT
Start: 2022-12-09

## 2022-12-09 RX ORDER — HYDROCODONE BITARTRATE AND ACETAMINOPHEN 5; 325 MG/1; MG/1
1.5 TABLET ORAL EVERY 6 HOURS PRN
Status: CANCELLED | OUTPATIENT
Start: 2022-12-09

## 2022-12-09 RX ORDER — HALOPERIDOL 5 MG/1
5 TABLET ORAL
Status: CANCELLED | OUTPATIENT
Start: 2022-12-09

## 2022-12-09 RX ORDER — LORAZEPAM 2 MG/ML
2 INJECTION INTRAMUSCULAR
Status: CANCELLED | OUTPATIENT
Start: 2022-12-09

## 2022-12-09 RX ORDER — HYDROXYZINE HYDROCHLORIDE 25 MG/1
50 TABLET, FILM COATED ORAL
Status: CANCELLED | OUTPATIENT
Start: 2022-12-09

## 2022-12-09 RX ORDER — ESTRADIOL 1 MG/1
2 TABLET ORAL DAILY
Status: CANCELLED | OUTPATIENT
Start: 2022-12-09

## 2022-12-09 RX ORDER — CELECOXIB 200 MG/1
200 CAPSULE ORAL 2 TIMES DAILY
Status: CANCELLED | OUTPATIENT
Start: 2022-12-09

## 2022-12-09 RX ORDER — DIAZEPAM 5 MG/1
10 TABLET ORAL EVERY 8 HOURS PRN
Status: CANCELLED | OUTPATIENT
Start: 2022-12-09

## 2022-12-09 RX ORDER — AMOXICILLIN 250 MG
2 CAPSULE ORAL 2 TIMES DAILY PRN
Status: CANCELLED | OUTPATIENT
Start: 2022-12-09

## 2022-12-09 RX ORDER — PROPRANOLOL HYDROCHLORIDE 20 MG/1
10 TABLET ORAL EVERY 8 HOURS PRN
Status: CANCELLED | OUTPATIENT
Start: 2022-12-09

## 2022-12-09 RX ORDER — PRAVASTATIN SODIUM 40 MG
40 TABLET ORAL
Status: CANCELLED | OUTPATIENT
Start: 2022-12-09

## 2022-12-09 RX ORDER — DEXTROAMPHETAMINE SACCHARATE, AMPHETAMINE ASPARTATE, DEXTROAMPHETAMINE SULFATE AND AMPHETAMINE SULFATE 2.5; 2.5; 2.5; 2.5 MG/1; MG/1; MG/1; MG/1
20 TABLET ORAL 2 TIMES DAILY
Status: DISCONTINUED | OUTPATIENT
Start: 2022-12-09 | End: 2022-12-10 | Stop reason: HOSPADM

## 2022-12-09 RX ORDER — DIPHENHYDRAMINE HYDROCHLORIDE 50 MG/ML
50 INJECTION INTRAMUSCULAR; INTRAVENOUS EVERY 6 HOURS PRN
Status: CANCELLED | OUTPATIENT
Start: 2022-12-09

## 2022-12-09 RX ORDER — HALOPERIDOL 5 MG/ML
5 INJECTION INTRAMUSCULAR
Status: CANCELLED | OUTPATIENT
Start: 2022-12-09

## 2022-12-09 RX ORDER — LIOTHYRONINE SODIUM 5 UG/1
10 TABLET ORAL DAILY
Status: CANCELLED | OUTPATIENT
Start: 2022-12-09

## 2022-12-09 RX ORDER — LEVETIRACETAM 250 MG/1
250 TABLET ORAL EVERY 12 HOURS SCHEDULED
Status: CANCELLED | OUTPATIENT
Start: 2022-12-09

## 2022-12-09 RX ORDER — GABAPENTIN 300 MG/1
600 CAPSULE ORAL
Status: CANCELLED | OUTPATIENT
Start: 2022-12-09

## 2022-12-09 RX ORDER — BENZTROPINE MESYLATE 1 MG/1
1 TABLET ORAL
Status: CANCELLED | OUTPATIENT
Start: 2022-12-09

## 2022-12-09 RX ORDER — LORAZEPAM 2 MG/ML
1 INJECTION INTRAMUSCULAR
Status: CANCELLED | OUTPATIENT
Start: 2022-12-09

## 2022-12-09 RX ORDER — HALOPERIDOL 5 MG/ML
2.5 INJECTION INTRAMUSCULAR
Status: CANCELLED | OUTPATIENT
Start: 2022-12-09

## 2022-12-09 RX ORDER — ESTRADIOL 1 MG/1
1.5 TABLET ORAL DAILY
Status: CANCELLED | OUTPATIENT
Start: 2022-12-10

## 2022-12-09 RX ORDER — GUAIFENESIN 600 MG/1
600 TABLET, EXTENDED RELEASE ORAL EVERY 12 HOURS PRN
Status: CANCELLED | OUTPATIENT
Start: 2022-12-09

## 2022-12-09 RX ORDER — POTASSIUM CHLORIDE 750 MG/1
10 TABLET, EXTENDED RELEASE ORAL 2 TIMES DAILY
Status: CANCELLED | OUTPATIENT
Start: 2022-12-09

## 2022-12-09 RX ORDER — LORATADINE 10 MG/1
10 TABLET ORAL
Status: CANCELLED | OUTPATIENT
Start: 2022-12-09

## 2022-12-09 RX ORDER — HYDROXYZINE HYDROCHLORIDE 25 MG/1
100 TABLET, FILM COATED ORAL
Status: CANCELLED | OUTPATIENT
Start: 2022-12-09

## 2022-12-09 RX ORDER — DEXTROAMPHETAMINE SACCHARATE, AMPHETAMINE ASPARTATE, DEXTROAMPHETAMINE SULFATE AND AMPHETAMINE SULFATE 2.5; 2.5; 2.5; 2.5 MG/1; MG/1; MG/1; MG/1
10 TABLET ORAL
Status: DISCONTINUED | OUTPATIENT
Start: 2022-12-09 | End: 2022-12-09

## 2022-12-09 RX ORDER — GABAPENTIN 300 MG/1
300 CAPSULE ORAL EVERY MORNING
Status: CANCELLED | OUTPATIENT
Start: 2022-12-10

## 2022-12-09 RX ORDER — HALOPERIDOL 1 MG/1
2 TABLET ORAL
Status: CANCELLED | OUTPATIENT
Start: 2022-12-09

## 2022-12-09 RX ORDER — TIZANIDINE 4 MG/1
4 TABLET ORAL EVERY 6 HOURS PRN
Status: CANCELLED | OUTPATIENT
Start: 2022-12-09

## 2022-12-09 RX ADMIN — TIZANIDINE 4 MG: 4 TABLET ORAL at 13:04

## 2022-12-09 RX ADMIN — ESTRADIOL 1.5 MG: 1 TABLET ORAL at 09:26

## 2022-12-09 RX ADMIN — POTASSIUM CHLORIDE 10 MEQ: 750 TABLET, EXTENDED RELEASE ORAL at 18:46

## 2022-12-09 RX ADMIN — GABAPENTIN 600 MG: 300 CAPSULE ORAL at 21:11

## 2022-12-09 RX ADMIN — DEXTROAMPHETAMINE SACCHARATE, AMPHETAMINE ASPARTATE, DEXTROAMPHETAMINE SULFATE AND AMPHETAMINE SULFATE 20 MG: 2.5; 2.5; 2.5; 2.5 TABLET ORAL at 18:45

## 2022-12-09 RX ADMIN — GUAIFENESIN 600 MG: 600 TABLET, EXTENDED RELEASE ORAL at 22:25

## 2022-12-09 RX ADMIN — VITAM B12 100 MCG: 100 TAB at 10:35

## 2022-12-09 RX ADMIN — SENNOSIDES AND DOCUSATE SODIUM 2 TABLET: 8.6; 5 TABLET ORAL at 20:22

## 2022-12-09 RX ADMIN — GABAPENTIN 300 MG: 300 CAPSULE ORAL at 09:24

## 2022-12-09 RX ADMIN — CELECOXIB 200 MG: 200 CAPSULE ORAL at 18:46

## 2022-12-09 RX ADMIN — PRAZOSIN HYDROCHLORIDE 5 MG: 2 CAPSULE ORAL at 10:08

## 2022-12-09 RX ADMIN — ESTRADIOL 2 MG: 1 TABLET ORAL at 20:11

## 2022-12-09 RX ADMIN — SENNOSIDES AND DOCUSATE SODIUM 2 TABLET: 8.6; 5 TABLET ORAL at 09:59

## 2022-12-09 RX ADMIN — TIZANIDINE 4 MG: 4 TABLET ORAL at 21:02

## 2022-12-09 RX ADMIN — DIAZEPAM 10 MG: 5 TABLET ORAL at 13:04

## 2022-12-09 RX ADMIN — POLYETHYLENE GLYCOL 3350 17 G: 17 POWDER, FOR SOLUTION ORAL at 15:49

## 2022-12-09 RX ADMIN — HYDROCODONE BITARTRATE AND ACETAMINOPHEN 1.5 TABLET: 5; 325 TABLET ORAL at 20:22

## 2022-12-09 RX ADMIN — LIOTHYRONINE SODIUM 10 MCG: 5 TABLET ORAL at 21:10

## 2022-12-09 RX ADMIN — DOXEPIN HYDROCHLORIDE 100 MG: 50 CAPSULE ORAL at 22:25

## 2022-12-09 RX ADMIN — PRAVASTATIN SODIUM 40 MG: 40 TABLET ORAL at 18:46

## 2022-12-09 RX ADMIN — DEXTROAMPHETAMINE SACCHARATE, AMPHETAMINE ASPARTATE, DEXTROAMPHETAMINE SULFATE AND AMPHETAMINE SULFATE 10 MG: 2.5; 2.5; 2.5; 2.5 TABLET ORAL at 13:03

## 2022-12-09 RX ADMIN — DEXTROAMPHETAMINE SACCHARATE, AMPHETAMINE ASPARTATE, DEXTROAMPHETAMINE SULFATE AND AMPHETAMINE SULFATE 20 MG: 2.5; 2.5; 2.5; 2.5 TABLET ORAL at 10:17

## 2022-12-09 RX ADMIN — DIAZEPAM 10 MG: 5 TABLET ORAL at 21:02

## 2022-12-09 RX ADMIN — HYDROCODONE BITARTRATE AND ACETAMINOPHEN 1.5 TABLET: 5; 325 TABLET ORAL at 09:27

## 2022-12-09 RX ADMIN — PRAZOSIN HYDROCHLORIDE 10 MG: 2 CAPSULE ORAL at 21:12

## 2022-12-09 RX ADMIN — LORATADINE 10 MG: 10 TABLET ORAL at 21:51

## 2022-12-09 RX ADMIN — POTASSIUM CHLORIDE 10 MEQ: 750 TABLET, EXTENDED RELEASE ORAL at 09:25

## 2022-12-09 RX ADMIN — CELECOXIB 200 MG: 200 CAPSULE ORAL at 09:25

## 2022-12-09 RX ADMIN — MONTELUKAST SODIUM 10 MG: 10 TABLET, COATED ORAL at 21:12

## 2022-12-09 NOTE — ED CARE HANDOFF
Emergency Department Sign Out Note        Sign out and transfer of care from [facility]-C  See Separate Emergency Department note  The patient, Asael Herrera, was evaluated by the previous provider for increased depression and suicidal ideations without a specific plan  Workup Completed:  Covid test  POCT alcohol breath test  POCT pregnancy, urine  UDS  Seen by Crisis  201 signed  ED Course / Workup Pending (followup):  1500 - Per sign out, no events today  Patient accepted at [facility]  EMTALA completed  0020 - Patient signed out to Rene Sadler PA-C awaiting transfer  Patient stable  ED Course as of 12/09/22 1532   Thu Dec 08, 2022   1117 Per Dr Maday Wheat - I have completed the psychiatry consult on [facility]  Inpatient psychiatric treatment as indicated due to current instability with suicidal ideation with multiple plans and with significant self-harm behaviors requiring that she see a burn specialist  Due to the patient's report of past "trauma" in the hospital setting, she is in agreement with this plan as long as it is [employer]  She may be open to other facilities after learning more about them from crisis  She is appropriate to sign 201 voluntary paperwork otherwise 302 is indicated unless an intensive outpatient follow-up approach can be developed for close observation with her outpatient   Recommend continuing the patient's home Rexulti 4 mg p o  every morning as the patient states "it is the only thing that works for me"  She currently is opposed to other medication but may benefit also from addition of Keppra 250 mg p o  twice daily as mood stabilizer if she becomes open to considering this  Continue observation suicide precautions remain indicated  Reconsult psychiatry as needed  let me know if any question  Thanks  [address] [place] [address] [address] with Dr Maday Wheat   Since we do not have Rexulti on formulary, he recommends giving patient Latuda 20 mg qd  Procedures  MDM  Number of Diagnoses or Management Options  Depression: new and requires workup  Suicidal ideation: new and requires workup     Amount and/or Complexity of Data Reviewed  Clinical lab tests: reviewed  Review and summarize past medical records: yes  Discuss the patient with other providers: yes    Patient Progress  Patient progress: stable          Disposition  Final diagnoses:   Depression   Suicidal ideation     Time reflects when diagnosis was documented in both MDM as applicable and the Disposition within this note     Time User Action Codes Description Comment    12/7/2022  2:52 PM Ángel Pulliam Add [T92  A] Depression     12/7/2022  2:52 PM Amber RANDALL Add [R92 324] Suicidal ideation     12/9/2022 12:44 PM Ari Felix Add [Z00 8] Medical clearance for psychiatric admission       ED Disposition     ED Disposition   Transfer to [address] [place] [address]   --    Date/Time   Wed Dec 7, 2022  2:52 PM    Comment   Ilia Kearns should be transferred out to behavioral health and has been medically cleared  MD Documentation    [address] [facility] Most Recent Value   Patient Condition The patient has been stabilized such that within reasonable medical probability, no material deterioration of the patient condition or the condition of the unborn child(ce) is likely to result from the transfer, No noted underlying medical condition requiring transfer to another facility   Transfer is per preference and request of patient and/or family   Reason for Transfer Level of Care needed not available at this facility   Benefits of Transfer Specialized equipment and/or services available at the receiving facility (Include comment)________________________   Risks of Transfer Potential for delay in receiving treatment   Accepting Physician Dr Mp Sloan Name, [address]    (Name & Tel number) Lee Ann Emmanuel [phone] Transported by [employer] and Unit #) [facility]   Sending MD Dr Lieutenant Pierce   Provider Certification General risk, such as traffic hazards, adverse weather conditions, rough terrain or turbulence, possible failure of equipment (including vehicle or aircraft), or consequences of actions of persons outside the control of the transport personnel, The patient is stable for psychiatric transfer because they are medically stable, and is protected from harming him/herself or others during transport      RN Documentation    [address] Jihan Ortiz Name, [address]    (Name & Tel number) Saudomar Ledbetter [phone]   Transported by [employer] and Unit #) [facility]      Follow-up Information    None       Patient's Medications   Discharge Prescriptions    No medications on file     No discharge procedures on file         ED Provider  Electronically Signed by     Anali Delgado PA-C  12/10/22 0022

## 2022-12-09 NOTE — ED NOTES
Assumed care of pt at this time  Pt in room this time with brother and service dog  Pt is in good spirits eating dinner at this time        Schuyler Hdz, [facility]  12/08/22 1949

## 2022-12-09 NOTE — ED NOTES
Pt sleeping at this time  BH assessment and vitals deferred until pt awake   1:1 continues     iBlly Degroot, [facility]  12/09/22 0057

## 2022-12-09 NOTE — ED NOTES
Pt's service dog is at bedside visiting with pt at this time   brought iced coffee in glass bottles which was placed in fridge with pt's label on it           Cristina Degroot RN  12/09/22 1944

## 2022-12-09 NOTE — ED NOTES
Assumed care of pt at this time  Pt in bed appears to be sleeping continuous 1:1 monitoring being done by [facility] ED tech        Mitra Smart RN  12/09/22 2916

## 2022-12-09 NOTE — EMTALA/ACUTE CARE TRANSFER
[facility]  [address] [facility] [address]  Dept: [phone]      EMTALA TRANSFER CONSENT    NAME Ilia Kearns                                         1975                              MRN [id number]    I have been informed of my rights regarding examination, treatment, and transfer   by Dr [address],     Benefits: Specialized equipment and/or services available at the receiving facility (Include comment)________________________    Risks: Potential for delay in receiving treatment      Consent for Transfer:  I acknowledge that my medical condition has been evaluated and explained to me by the emergency department physician or other qualified medical person and/or my attending physician, who has recommended that I be transferred to the service of  Accepting Physician:  [facility] ON [facility] at [address] Name, Jelani 41 : [address]  The above potential benefits of such transfer, the potential risks associated with such transfer, and the probable risks of not being transferred have been explained to me, and I fully understand them  The doctor has explained that, in my case, the benefits of transfer outweigh the risks  I agree to be transferred  I authorize the performance of emergency medical procedures and treatments upon me in both transit and upon arrival at the receiving facility  Additionally, I authorize the release of any and all medical records to the receiving facility and request they be transported with me, if possible  I understand that the safest mode of transportation during a medical emergency is an ambulance and that the Hospital advocates the use of this mode of transport  Risks of traveling to the receiving facility by car, including absence of medical control, life sustaining equipment, such as oxygen, and medical personnel has been explained to me and I fully understand them      ([address])  [  ]  I consent to the stated transfer and to be transported by ambulance/helicopter  [  ]  I consent to the stated transfer, but refuse transportation by ambulance and accept full responsibility for my transportation by car  I understand the risks of non-ambulance transfers and I exonerate the Hospital and its staff from any deterioration in my condition that results from this refusal     X___________________________________________    DATE  22  TIME________  Signature of patient or legally responsible individual signing on patient behalf           RELATIONSHIP TO PATIENT_________________________          Provider Certification    NAME [address] [place] [address] 1975                              MRN [id number]    A medical screening exam was performed on the above named patient  Based on the examination:    Condition Necessitating Transfer The primary encounter diagnosis was Depression  Diagnoses of Suicidal ideation and Medical clearance for psychiatric admission were also pertinent to this visit  Patient Condition: The patient has been stabilized such that within reasonable medical probability, no material deterioration of the patient condition or the condition of the unborn child(ce) is likely to result from the transfer, No noted underlying medical condition requiring transfer to another facility   Transfer is per preference and request of patient and/or family    Reason for Transfer: Level of Care needed not available at this facility    Transfer Requirements:  [address]   · Space available and qualified personnel available for treatment as acknowledged by Dhaval Mario [phone]  · Agreed to accept transfer and to provide appropriate medical treatment as acknowledged by       Dr Shailesh Dickson  · Appropriate medical records of the examination and treatment of the patient are provided at the time of transfer   [address], [address] _______  · Transfer will be performed by qualified personnel from [facility]  and appropriate transfer equipment as required, including the use of necessary and appropriate life support measures  Provider Certification: I have examined the patient and explained the following risks and benefits of being transferred/refusing transfer to the patient/family:  General risk, such as traffic hazards, adverse weather conditions, rough terrain or turbulence, possible failure of equipment (including vehicle or aircraft), or consequences of actions of persons outside the control of the transport personnel, The patient is stable for psychiatric transfer because they are medically stable, and is protected from harming him/herself or others during transport      Based on these reasonable risks and benefits to the patient and/or the unborn child(ce), and based upon the information available at the time of the patient’s examination, I certify that the medical benefits reasonably to be expected from the provision of appropriate medical treatments at another medical facility outweigh the increasing risks, if any, to the individual’s medical condition, and in the case of labor to the unborn child, from effecting the transfer      X____________________________________________ DATE 12/09/22        TIME_______      ORIGINAL - SEND TO MEDICAL RECORDS   COPY - SEND WITH PATIENT DURING TRANSFER

## 2022-12-09 NOTE — ED NOTES
Yunier and spoke with Ed in Admissions regarding earlier referral  He checked their records from earlier this afternoon and said this patient had been denied at that time due to her acuity   He recommended first shift call back Friday morning to inquire about any additional dc beds as they might be able to re-review with updated information since last referral

## 2022-12-09 NOTE — ED NOTES
Pt requesting coloring pages at this time  Along with cell phone to call friend  RN allowed d/t previous RN telling this RN it has been allowed  Pt asking if she can listen to music on her phone while she colors  Pt calm and cooperative at this time 1:1 continues       Sandra Velez, [address] [place] [address]  12/08/22 3578

## 2022-12-09 NOTE — ED NOTES
Pt requesting to go over medication list with this RN to ensure night time meds are correct  BRODY notified on changes       Chelo Mis, [place]  12/08/22 2051

## 2022-12-09 NOTE — ED CARE HANDOFF
Emergency Department Sign Out Note        Sign out and transfer of care from [facility]  See Separate Emergency Department note  The patient, Mitul Hernández, was evaluated by the previous provider for suicidal ideations, psychiatric evaluation  Workup Completed:  Labs Reviewed   RAPID DRUG SCREEN, URINE - Abnormal       Result Value Ref Range Status    Amph/Meth UR Positive (*) Negative Final    Barbiturate Ur Negative  Negative Final    Benzodiazepine Urine Positive (*) Negative Final    Cocaine Urine Negative  Negative Final    Methadone Urine Negative  Negative Final    Opiate Urine Positive (*) Negative Final    PCP Ur Negative  Negative Final    THC Urine Positive (*) Negative Final    Oxycodone Urine Negative  Negative Final    Narrative:     Presumptive report  If requested, specimen will be sent to reference lab for confirmation  FOR MEDICAL PURPOSES ONLY  IF CONFIRMATION NEEDED PLEASE CONTACT THE LAB WITHIN 5 DAYS  Drug Screen Cutoff Levels:  AMPHETAMINE/METHAMPHETAMINES  1000 ng/mL  BARBITURATES     200 ng/mL  BENZODIAZEPINES     200 ng/mL  COCAINE      300 ng/mL  METHADONE      300 ng/mL  OPIATES      300 ng/mL  PHENCYCLIDINE     25 ng/mL  THC       50 ng/mL  OXYCODONE      100 ng/mL   NOVEL CORONAVIRUS (COVID-19), PCR [facility] - Normal    SARS-CoV-2 Negative  Negative Final    Comment:      Narrative:     FOR PEDIATRIC PATIENTS - copy/paste COVID Guidelines URL to browser: [email or web address] org/  ashx    SARS-CoV-2 assay is a Nucleic Acid Amplification assay intended for the  qualitative detection of nucleic acid from SARS-CoV-2 in nasopharyngeal  swabs  Results are for the presumptive identification of SARS-CoV-2 RNA  Positive results are indicative of infection with SARS-CoV-2, the virus  causing COVID-19, but do not rule out bacterial infection or co-infection  with other viruses   Laboratories within the [place] and its  territories are required to report all positive results to the appropriate  public health authorities  Negative results do not preclude SARS-CoV-2  infection and should not be used as the sole basis for treatment or other  patient management decisions  Negative results must be combined with  clinical observations, patient history, and epidemiological information  This test has not been FDA cleared or approved  This test has been authorized by FDA under an Emergency Use Authorization  (EUA)  This test is only authorized for the duration of time the  declaration that circumstances exist justifying the authorization of the  emergency use of an in vitro diagnostic tests for detection of SARS-CoV-2  virus and/or diagnosis of COVID-19 infection under section 564(b)(1) of  the Act, 21 U  S C  695QEQ-8(H)(2), unless the authorization is terminated  or revoked sooner  The test has been validated but independent review by FDA  and CLIA is pending  Test performed using [employer] GeneXpert: This RT-PCR assay targets N2,  a region unique to SARS-CoV-2  A conserved region in the E-gene was chosen  for pan-Sarbecovirus detection which includes SARS-CoV-2  According to CMS-2020-01-R, this platform meets the definition of high-throughput technology  COVID19, INFLUENZA A/B, RSV PCR, [facility] - Normal    SARS-CoV-2 Negative  Negative Final    Comment:      INFLUENZA A PCR Negative  Negative Final    Comment:      INFLUENZA B PCR Negative  Negative Final    Comment:      RSV PCR Negative  Negative Final    Comment:      Narrative:     FOR PEDIATRIC PATIENTS - copy/paste COVID Guidelines URL to browser: [email or web address] [email or web address]  [email or web address]    SARS-CoV-2 assay is a Nucleic Acid Amplification assay intended for the  qualitative detection of nucleic acid from SARS-CoV-2 in nasopharyngeal  swabs  Results are for the presumptive identification of SARS-CoV-2 RNA      Positive results are indicative of infection with SARS-CoV-2, the virus  causing COVID-19, but do not rule out bacterial infection or co-infection  with other viruses  Laboratories within the [place] and its  territories are required to report all positive results to the appropriate  public health authorities  Negative results do not preclude SARS-CoV-2  infection and should not be used as the sole basis for treatment or other  patient management decisions  Negative results must be combined with  clinical observations, patient history, and epidemiological information  This test has not been FDA cleared or approved  This test has been authorized by FDA under an Emergency Use Authorization  (EUA)  This test is only authorized for the duration of time the  declaration that circumstances exist justifying the authorization of the  emergency use of an in vitro diagnostic tests for detection of SARS-CoV-2  virus and/or diagnosis of COVID-19 infection under section 564(b)(1) of  the Act, 21 U  S C  533TKV-8(S)(5), unless the authorization is terminated  or revoked sooner  The test has been validated but independent review by FDA  and CLIA is pending  Test performed using [employer] GeneXpert: This RT-PCR assay targets N2,  a region unique to SARS-CoV-2  A conserved region in the E-gene was chosen  for pan-Sarbecovirus detection which includes SARS-CoV-2  According to CMS-2020-01-R, this platform meets the definition of high-throughput technology     POCT ALCOHOL BREATH TEST - Normal    EXTBreath Alcohol 0 00   Final   POCT PREGNANCY, URINE - Normal    EXT Preg Test, Ur Negative   Final    Control Valid   Final         ED Course / Workup Pending (followup):  -201 signed, per psych 302 to be completed if patient attempts to withdraw 201  -Awaiting placement at time of my sign on                                  ED Course as of 12/09/22 1542   Fri Dec 09, 2022   1027 Following discussion with pharmacy, adderrall 40 mg XR not available here, will substitute for adderrall 20 mg BID and continue 10 mg at 1:00 pm  Pt very upset by this substitution and states "don't talk to me again"  1028 Pt refuses Paulina Aguirre not available here  Reports her friend is going to bring her rexulti from home  Procedures  MDM  Number of Diagnoses or Management Options  Depression  Suicidal ideation  Diagnosis management comments: Stable throughout time of my care  Amount and/or Complexity of Data Reviewed  Clinical lab tests: reviewed    Patient Progress  Patient progress: stable          Disposition  Final diagnoses:   Depression   Suicidal ideation     Time reflects when diagnosis was documented in both MDM as applicable and the Disposition within this note     Time User Action Codes Description Comment    2022  2:52 PM Yaneli James Add [I50  A] Depression     2022  2:52 PM Letha RANDALL Add [O73 443] Suicidal ideation     2022 12:44 PM Julio Hawkins Add [Z00 8] Medical clearance for psychiatric admission       ED Disposition     ED Disposition   Transfer to [address] [place] [address]   --    Date/Time   Wed Dec 7, 2022  2:52 PM    Comment   Ilia Kearns should be transferred out to behavioral health and has been medically cleared  MD Documentation    [facility]  Most Recent Value   Patient Condition The patient has been stabilized such that within reasonable medical probability, no material deterioration of the patient condition or the condition of the unborn child(ce) is likely to result from the transfer, No noted underlying medical condition requiring transfer to another facility   Transfer is per preference and request of patient and/or family   Reason for Transfer Level of Care needed not available at this facility   Benefits of Transfer Specialized equipment and/or services available at the receiving facility (Include comment)________________________   Risks of Transfer Potential for delay in receiving treatment   Accepting Physician Dr Kristian Pepe Name, [address]    (Name & Tel number) Jamison Sutton [phone]   Transported by (Company and Unit #) [facility]   Sending MD Dr Oleg Stover   Provider Certification General risk, such as traffic hazards, adverse weather conditions, rough terrain or turbulence, possible failure of equipment (including vehicle or aircraft), or consequences of actions of persons outside the control of the transport personnel, The patient is stable for psychiatric transfer because they are medically stable, and is protected from harming him/herself or others during transport      RN Documentation    72 Jihan Ortiz Name, [address]    (Name & Tel number) Jamisonolvin Sutton [phone]   [facility]      Follow-up Information    None       Patient's Medications   Discharge Prescriptions    No medications on file     No discharge procedures on file         ED Provider  Electronically Signed by     Shon Mike PA-C  12/09/22 2188

## 2022-12-09 NOTE — ED NOTES
Pt made aware of  tomorrow morning for transport to [facility]  Pt disappointed that she is not leaving till tomorrow reports she us unable to get care for her dogs for extended period of time and would like to sign her self out  Pt made aware she signed a 201 and had consult with psychiatrist who did not feel pt was safe to be discharged at this time  Pt stated that she was under duress when she signed that and that she will be suing the hospital for keeping her here against her will  Pt redirected multiple times by ED tech but continues to fixate on going home because she is unable to find anyone to take care of her dogs        Bryan Arnett RN  12/09/22 0989

## 2022-12-09 NOTE — ED NOTES
Patient is accepted at [facility] 3b  Patient is accepted by Dr Le Quintero 57 is arranged with [facility]  Transportation is scheduled for TBD     Nurse report is to be called to [phone]   prior to patient transfer

## 2022-12-09 NOTE — ED NOTES
RN took iced coffee and placed it in zone 2 fridge for pt when she wants it   Pt label on cup     Armando Suleman, [facility]  12/08/22 2059

## 2022-12-09 NOTE — ED NOTES
Pt requesting lights turned down and is resting while watching TV at this time  Cell phone returned to this RN without issue       [facility]  12/08/22 2121

## 2022-12-09 NOTE — ED NOTES
Assumed care of patient at this time  No distress noted   1:1 observing patient      Ruth Jimenez RN  12/09/22 7439

## 2022-12-09 NOTE — ED NOTES
RN assumed care of pt at this time  Pt appears in no distress at this time   Repirations equal and unlabored     Teri Bajwa RN  12/09/22 8593

## 2022-12-10 ENCOUNTER — HOSPITAL ENCOUNTER (INPATIENT)
Facility: HOSPITAL | Age: 47
LOS: 2 days | Discharge: HOME | End: 2022-12-12
Attending: PSYCHIATRY & NEUROLOGY | Admitting: STUDENT IN AN ORGANIZED HEALTH CARE EDUCATION/TRAINING PROGRAM

## 2022-12-10 VITALS
RESPIRATION RATE: 16 BRPM | WEIGHT: 153 LBS | TEMPERATURE: 98.6 F | SYSTOLIC BLOOD PRESSURE: 144 MMHG | HEART RATE: 84 BPM | OXYGEN SATURATION: 97 % | BODY MASS INDEX: 30.9 KG/M2 | DIASTOLIC BLOOD PRESSURE: 84 MMHG

## 2022-12-10 DIAGNOSIS — E03.9 HYPOTHYROIDISM, UNSPECIFIED TYPE: ICD-10-CM

## 2022-12-10 DIAGNOSIS — N83.201 CYST OF RIGHT OVARY: ICD-10-CM

## 2022-12-10 DIAGNOSIS — T30.0 BURN: ICD-10-CM

## 2022-12-10 DIAGNOSIS — J30.9 ALLERGIC RHINITIS, UNSPECIFIED SEASONALITY, UNSPECIFIED TRIGGER: ICD-10-CM

## 2022-12-10 DIAGNOSIS — J45.909 UNCOMPLICATED ASTHMA, UNSPECIFIED ASTHMA SEVERITY, UNSPECIFIED WHETHER PERSISTENT: ICD-10-CM

## 2022-12-10 DIAGNOSIS — M25.562 CHRONIC PAIN OF LEFT KNEE: ICD-10-CM

## 2022-12-10 DIAGNOSIS — G89.29 CHRONIC PAIN OF LEFT KNEE: ICD-10-CM

## 2022-12-10 DIAGNOSIS — M25.552 LEFT HIP PAIN: ICD-10-CM

## 2022-12-10 DIAGNOSIS — E78.2 MIXED HYPERLIPIDEMIA: ICD-10-CM

## 2022-12-10 DIAGNOSIS — F43.12 POST-TRAUMATIC STRESS DISORDER, CHRONIC: Chronic | ICD-10-CM

## 2022-12-10 DIAGNOSIS — Z00.8 MEDICAL CLEARANCE FOR PSYCHIATRIC ADMISSION: ICD-10-CM

## 2022-12-10 DIAGNOSIS — F12.20 CANNABIS DEPENDENCE, CONTINUOUS (HCC): Chronic | ICD-10-CM

## 2022-12-10 DIAGNOSIS — F90.2 ADHD (ATTENTION DEFICIT HYPERACTIVITY DISORDER), COMBINED TYPE: Chronic | ICD-10-CM

## 2022-12-10 DIAGNOSIS — F31.64 BIPOLAR I DISORDER, MOST RECENT EPISODE MIXED, SEVERE WITH PSYCHOTIC FEATURES (HCC): Primary | Chronic | ICD-10-CM

## 2022-12-10 PROBLEM — Z90.721 S/P RIGHT OOPHORECTOMY: Status: ACTIVE | Noted: 2022-04-09

## 2022-12-10 PROBLEM — T22.332A: Status: ACTIVE | Noted: 2022-09-30

## 2022-12-10 PROBLEM — Z96.642 STATUS POST TOTAL REPLACEMENT OF LEFT HIP: Status: ACTIVE | Noted: 2020-03-24

## 2022-12-10 PROBLEM — T84.023A INSTABILITY OF INTERNAL LEFT KNEE PROSTHESIS (HCC): Status: ACTIVE | Noted: 2022-02-22

## 2022-12-10 PROBLEM — N83.519 OVARIAN TORSION: Status: ACTIVE | Noted: 2022-04-08

## 2022-12-10 PROBLEM — M79.2 NEUROPATHIC PAIN: Status: ACTIVE | Noted: 2022-11-16
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
## 2022-12-10 PROBLEM — T22.311A: Status: ACTIVE | Noted: 2022-10-17

## 2022-12-10 PROBLEM — M54.50 CHRONIC BILATERAL LOW BACK PAIN WITHOUT SCIATICA: Status: ACTIVE | Noted: 2019-10-16

## 2022-12-10 PROBLEM — G47.33 OSA (OBSTRUCTIVE SLEEP APNEA): Status: ACTIVE | Noted: 2020-07-16

## 2022-12-10 PROBLEM — F60.3 BORDERLINE PERSONALITY DISORDER (HCC): Chronic | Status: ACTIVE | Noted: 2022-12-10

## 2022-12-10 PROBLEM — Z96.652 S/P TOTAL KNEE ARTHROPLASTY, LEFT: Status: ACTIVE | Noted: 2020-09-15

## 2022-12-10 PROBLEM — D17.30 LIPOMA OF SKIN: Status: ACTIVE | Noted: 2022-03-04

## 2022-12-10 PROBLEM — S73.192A TEAR OF LEFT ACETABULAR LABRUM: Status: ACTIVE | Noted: 2020-02-11

## 2022-12-10 PROBLEM — K57.90 DIVERTICULOSIS: Status: ACTIVE | Noted: 2019-09-19

## 2022-12-10 PROBLEM — Z96.649 S/P REVISION OF TOTAL HIP: Status: ACTIVE | Noted: 2021-05-06

## 2022-12-10 LAB
25(OH)D3 SERPL-MCNC: 14.6 NG/ML (ref 30–100)
ALBUMIN SERPL BCP-MCNC: 4.3 G/DL (ref 3.5–5)
ALP SERPL-CCNC: 62 U/L (ref 43–122)
ALT SERPL W P-5'-P-CCNC: 20 U/L
ANION GAP SERPL CALCULATED.3IONS-SCNC: 5 MMOL/L (ref 5–14)
AST SERPL W P-5'-P-CCNC: 34 U/L (ref 14–36)
BASOPHILS # BLD AUTO: 0.04 THOUSANDS/ÂΜL (ref 0–0.1)
BASOPHILS NFR BLD AUTO: 1 % (ref 0–1)
BILIRUB SERPL-MCNC: 0.47 MG/DL (ref 0.2–1)
BUN SERPL-MCNC: 16 MG/DL (ref 5–25)
CALCIUM SERPL-MCNC: 9.5 MG/DL (ref 8.4–10.2)
CHLORIDE SERPL-SCNC: 104 MMOL/L (ref 96–108)
CHOLEST SERPL-MCNC: 251 MG/DL
CO2 SERPL-SCNC: 28 MMOL/L (ref 21–32)
CREAT SERPL-MCNC: 0.68 MG/DL (ref 0.6–1.2)
EOSINOPHIL # BLD AUTO: 0.15 THOUSAND/ÂΜL (ref 0–0.61)
EOSINOPHIL NFR BLD AUTO: 2 % (ref 0–6)
ERYTHROCYTE [DISTWIDTH] IN BLOOD BY AUTOMATED COUNT: 11.5 % (ref 11.6–15.1)
FOLATE SERPL-MCNC: >20 NG/ML (ref 3.1–17.5)
GFR SERPL CREATININE-BSD FRML MDRD: 103 ML/MIN/1.73SQ M
GLUCOSE SERPL-MCNC: 150 MG/DL (ref 70–99)
HCT VFR BLD AUTO: 45.8 % (ref 34.8–46.1)
HDLC SERPL-MCNC: 74 MG/DL
HGB BLD-MCNC: 14.4 G/DL (ref 11.5–15.4)
IMM GRANULOCYTES # BLD AUTO: 0.01 THOUSAND/UL (ref 0–0.2)
IMM GRANULOCYTES NFR BLD AUTO: 0 % (ref 0–2)
LDLC SERPL CALC-MCNC: 151 MG/DL
LYMPHOCYTES # BLD AUTO: 2.16 THOUSANDS/ÂΜL (ref 0.6–4.47)
LYMPHOCYTES NFR BLD AUTO: 34 % (ref 14–44)
MCH RBC QN AUTO: 31 PG (ref 26.8–34.3)
MCHC RBC AUTO-ENTMCNC: 31.4 G/DL (ref 31.4–37.4)
MCV RBC AUTO: 99 FL (ref 82–98)
MONOCYTES # BLD AUTO: 0.47 THOUSAND/ÂΜL (ref 0.17–1.22)
MONOCYTES NFR BLD AUTO: 8 % (ref 4–12)
NEUTROPHILS # BLD AUTO: 3.44 THOUSANDS/ÂΜL (ref 1.85–7.62)
NEUTS SEG NFR BLD AUTO: 55 % (ref 43–75)
NONHDLC SERPL-MCNC: 177 MG/DL
NRBC BLD AUTO-RTO: 0 /100 WBCS
PLATELET # BLD AUTO: 335 THOUSANDS/UL (ref 149–390)
PMV BLD AUTO: 8.8 FL (ref 8.9–12.7)
POTASSIUM SERPL-SCNC: 4.7 MMOL/L (ref 3.5–5.3)
PROT SERPL-MCNC: 7.7 G/DL (ref 6.4–8.4)
RBC # BLD AUTO: 4.65 MILLION/UL (ref 3.81–5.12)
SODIUM SERPL-SCNC: 137 MMOL/L (ref 135–147)
T4 FREE SERPL-MCNC: 0.72 NG/DL (ref 0.76–1.46)
TRIGL SERPL-MCNC: 130 MG/DL
TSH SERPL DL<=0.05 MIU/L-ACNC: 4.94 UIU/ML (ref 0.45–4.5)
VIT B12 SERPL-MCNC: 446 PG/ML (ref 100–900)
WBC # BLD AUTO: 6.27 THOUSAND/UL (ref 4.31–10.16)

## 2022-12-10 PROCEDURE — GZ59ZZZ INDIVIDUAL PSYCHOTHERAPY, PSYCHOPHYSIOLOGICAL: ICD-10-PCS | Performed by: PSYCHIATRY & NEUROLOGY

## 2022-12-10 PROCEDURE — GZHZZZZ GROUP PSYCHOTHERAPY: ICD-10-PCS | Performed by: PSYCHIATRY & NEUROLOGY

## 2022-12-10 RX ORDER — ACETAMINOPHEN 325 MG/1
650 TABLET ORAL EVERY 6 HOURS PRN
Status: DISCONTINUED | OUTPATIENT
Start: 2022-12-10 | End: 2022-12-12 | Stop reason: HOSPADM

## 2022-12-10 RX ORDER — ALBUTEROL SULFATE 90 UG/1
2 AEROSOL, METERED RESPIRATORY (INHALATION) EVERY 4 HOURS PRN
Status: DISCONTINUED | OUTPATIENT
Start: 2022-12-10 | End: 2022-12-12 | Stop reason: HOSPADM

## 2022-12-10 RX ORDER — LORAZEPAM 2 MG/ML
2 INJECTION INTRAMUSCULAR EVERY 6 HOURS PRN
Status: DISCONTINUED | OUTPATIENT
Start: 2022-12-10 | End: 2022-12-12 | Stop reason: HOSPADM

## 2022-12-10 RX ORDER — TRAZODONE HYDROCHLORIDE 50 MG/1
50 TABLET ORAL
Status: DISCONTINUED | OUTPATIENT
Start: 2022-12-10 | End: 2022-12-12 | Stop reason: HOSPADM

## 2022-12-10 RX ORDER — HALOPERIDOL 1 MG/1
2 TABLET ORAL
Status: DISCONTINUED | OUTPATIENT
Start: 2022-12-10 | End: 2022-12-12 | Stop reason: HOSPADM

## 2022-12-10 RX ORDER — BENZTROPINE MESYLATE 1 MG/ML
1 INJECTION INTRAMUSCULAR; INTRAVENOUS
Status: DISCONTINUED | OUTPATIENT
Start: 2022-12-10 | End: 2022-12-12 | Stop reason: HOSPADM

## 2022-12-10 RX ORDER — ACETAMINOPHEN 325 MG/1
975 TABLET ORAL EVERY 6 HOURS PRN
Status: DISCONTINUED | OUTPATIENT
Start: 2022-12-10 | End: 2022-12-10

## 2022-12-10 RX ORDER — DEXTROAMPHETAMINE SACCHARATE, AMPHETAMINE ASPARTATE, DEXTROAMPHETAMINE SULFATE AND AMPHETAMINE SULFATE 2.5; 2.5; 2.5; 2.5 MG/1; MG/1; MG/1; MG/1
10 TABLET ORAL DAILY
Status: DISCONTINUED | OUTPATIENT
Start: 2022-12-10 | End: 2022-12-10

## 2022-12-10 RX ORDER — GABAPENTIN 300 MG/1
300 CAPSULE ORAL EVERY MORNING
Status: DISCONTINUED | OUTPATIENT
Start: 2022-12-10 | End: 2022-12-12 | Stop reason: HOSPADM

## 2022-12-10 RX ORDER — HYDROCODONE BITARTRATE AND ACETAMINOPHEN 5; 325 MG/1; MG/1
1.5 TABLET ORAL EVERY 6 HOURS PRN
Status: DISCONTINUED | OUTPATIENT
Start: 2022-12-10 | End: 2022-12-10

## 2022-12-10 RX ORDER — LIDOCAINE 50 MG/G
1 OINTMENT TOPICAL 4 TIMES DAILY PRN
Status: DISCONTINUED | OUTPATIENT
Start: 2022-12-10 | End: 2022-12-12 | Stop reason: HOSPADM

## 2022-12-10 RX ORDER — HALOPERIDOL 5 MG/ML
5 INJECTION INTRAMUSCULAR
Status: DISCONTINUED | OUTPATIENT
Start: 2022-12-10 | End: 2022-12-12 | Stop reason: HOSPADM

## 2022-12-10 RX ORDER — GABAPENTIN 300 MG/1
600 CAPSULE ORAL
Status: DISCONTINUED | OUTPATIENT
Start: 2022-12-10 | End: 2022-12-12 | Stop reason: HOSPADM

## 2022-12-10 RX ORDER — CELECOXIB 100 MG/1
200 CAPSULE ORAL 2 TIMES DAILY
Status: DISCONTINUED | OUTPATIENT
Start: 2022-12-10 | End: 2022-12-12 | Stop reason: HOSPADM

## 2022-12-10 RX ORDER — LIDOCAINE 50 MG/G
1 OINTMENT TOPICAL 4 TIMES DAILY PRN
Status: DISCONTINUED | OUTPATIENT
Start: 2022-12-10 | End: 2022-12-10

## 2022-12-10 RX ORDER — ACETAMINOPHEN 325 MG/1
975 TABLET ORAL EVERY 6 HOURS PRN
Status: DISCONTINUED | OUTPATIENT
Start: 2022-12-10 | End: 2022-12-12 | Stop reason: HOSPADM

## 2022-12-10 RX ORDER — GUAIFENESIN 600 MG/1
600 TABLET, EXTENDED RELEASE ORAL EVERY 12 HOURS PRN
Status: DISCONTINUED | OUTPATIENT
Start: 2022-12-10 | End: 2022-12-12 | Stop reason: HOSPADM

## 2022-12-10 RX ORDER — PRAZOSIN HYDROCHLORIDE 5 MG/1
5 CAPSULE ORAL DAILY
Status: DISCONTINUED | OUTPATIENT
Start: 2022-12-11 | End: 2022-12-12 | Stop reason: HOSPADM

## 2022-12-10 RX ORDER — HYDROXYZINE HYDROCHLORIDE 25 MG/1
25 TABLET, FILM COATED ORAL
Status: DISCONTINUED | OUTPATIENT
Start: 2022-12-10 | End: 2022-12-12 | Stop reason: HOSPADM

## 2022-12-10 RX ORDER — LANOLIN ALCOHOL/MO/W.PET/CERES
3 CREAM (GRAM) TOPICAL
Status: DISCONTINUED | OUTPATIENT
Start: 2022-12-10 | End: 2022-12-12 | Stop reason: HOSPADM

## 2022-12-10 RX ORDER — PROPRANOLOL HYDROCHLORIDE 10 MG/1
10 TABLET ORAL EVERY 8 HOURS PRN
Status: DISCONTINUED | OUTPATIENT
Start: 2022-12-10 | End: 2022-12-12 | Stop reason: HOSPADM

## 2022-12-10 RX ORDER — POLYETHYLENE GLYCOL 3350 17 G/17G
17 POWDER, FOR SOLUTION ORAL DAILY PRN
Status: DISCONTINUED | OUTPATIENT
Start: 2022-12-10 | End: 2022-12-12 | Stop reason: HOSPADM

## 2022-12-10 RX ORDER — HYDROXYZINE 50 MG/1
50 TABLET, FILM COATED ORAL
Status: DISCONTINUED | OUTPATIENT
Start: 2022-12-10 | End: 2022-12-12 | Stop reason: HOSPADM

## 2022-12-10 RX ORDER — PRAZOSIN HYDROCHLORIDE 5 MG/1
10 CAPSULE ORAL
Status: DISCONTINUED | OUTPATIENT
Start: 2022-12-10 | End: 2022-12-12 | Stop reason: HOSPADM

## 2022-12-10 RX ORDER — POTASSIUM CHLORIDE 750 MG/1
10 TABLET, EXTENDED RELEASE ORAL 2 TIMES DAILY
Status: DISCONTINUED | OUTPATIENT
Start: 2022-12-10 | End: 2022-12-12 | Stop reason: HOSPADM

## 2022-12-10 RX ORDER — TRIAMCINOLONE ACETONIDE 55 UG/1
2 SPRAY, METERED NASAL DAILY
Status: DISCONTINUED | OUTPATIENT
Start: 2022-12-10 | End: 2022-12-12 | Stop reason: HOSPADM

## 2022-12-10 RX ORDER — DIAZEPAM 5 MG/1
5 TABLET ORAL 3 TIMES DAILY PRN
Status: DISPENSED | OUTPATIENT
Start: 2022-12-10 | End: 2022-12-12

## 2022-12-10 RX ORDER — DIAZEPAM 5 MG/1
10 TABLET ORAL EVERY 8 HOURS PRN
Status: DISCONTINUED | OUTPATIENT
Start: 2022-12-10 | End: 2022-12-10

## 2022-12-10 RX ORDER — MAGNESIUM HYDROXIDE/ALUMINUM HYDROXICE/SIMETHICONE 120; 1200; 1200 MG/30ML; MG/30ML; MG/30ML
30 SUSPENSION ORAL EVERY 4 HOURS PRN
Status: DISCONTINUED | OUTPATIENT
Start: 2022-12-10 | End: 2022-12-12 | Stop reason: HOSPADM

## 2022-12-10 RX ORDER — BENZTROPINE MESYLATE 1 MG/ML
0.5 INJECTION INTRAMUSCULAR; INTRAVENOUS
Status: DISCONTINUED | OUTPATIENT
Start: 2022-12-10 | End: 2022-12-12 | Stop reason: HOSPADM

## 2022-12-10 RX ORDER — LIOTHYRONINE SODIUM 5 UG/1
10 TABLET ORAL DAILY
Status: DISCONTINUED | OUTPATIENT
Start: 2022-12-10 | End: 2022-12-12 | Stop reason: HOSPADM

## 2022-12-10 RX ORDER — LORATADINE 10 MG/1
10 TABLET ORAL
Status: DISCONTINUED | OUTPATIENT
Start: 2022-12-10 | End: 2022-12-12 | Stop reason: HOSPADM

## 2022-12-10 RX ORDER — AMOXICILLIN 250 MG
2 CAPSULE ORAL 2 TIMES DAILY PRN
Status: DISCONTINUED | OUTPATIENT
Start: 2022-12-10 | End: 2022-12-12 | Stop reason: HOSPADM

## 2022-12-10 RX ORDER — ESTRADIOL 1 MG/1
2 TABLET ORAL DAILY
Status: DISCONTINUED | OUTPATIENT
Start: 2022-12-10 | End: 2022-12-12 | Stop reason: HOSPADM

## 2022-12-10 RX ORDER — HALOPERIDOL 5 MG/1
5 TABLET ORAL
Status: DISCONTINUED | OUTPATIENT
Start: 2022-12-10 | End: 2022-12-12 | Stop reason: HOSPADM

## 2022-12-10 RX ORDER — PRAVASTATIN SODIUM 40 MG
40 TABLET ORAL
Status: DISCONTINUED | OUTPATIENT
Start: 2022-12-10 | End: 2022-12-12 | Stop reason: HOSPADM

## 2022-12-10 RX ORDER — HALOPERIDOL 5 MG/ML
2.5 INJECTION INTRAMUSCULAR
Status: DISCONTINUED | OUTPATIENT
Start: 2022-12-10 | End: 2022-12-12 | Stop reason: HOSPADM

## 2022-12-10 RX ORDER — LEVETIRACETAM 250 MG/1
250 TABLET ORAL EVERY 12 HOURS SCHEDULED
Status: DISCONTINUED | OUTPATIENT
Start: 2022-12-10 | End: 2022-12-10

## 2022-12-10 RX ORDER — LORAZEPAM 2 MG/ML
1 INJECTION INTRAMUSCULAR
Status: DISCONTINUED | OUTPATIENT
Start: 2022-12-10 | End: 2022-12-12 | Stop reason: HOSPADM

## 2022-12-10 RX ORDER — DOXEPIN HYDROCHLORIDE 100 MG/1
100 CAPSULE ORAL
Status: DISCONTINUED | OUTPATIENT
Start: 2022-12-10 | End: 2022-12-10

## 2022-12-10 RX ORDER — CHOLECALCIFEROL (VITAMIN D3) 10 MCG
1 TABLET ORAL
Status: DISCONTINUED | OUTPATIENT
Start: 2022-12-10 | End: 2022-12-10

## 2022-12-10 RX ORDER — TIZANIDINE 4 MG/1
4 TABLET ORAL EVERY 6 HOURS PRN
Status: DISCONTINUED | OUTPATIENT
Start: 2022-12-10 | End: 2022-12-12 | Stop reason: HOSPADM

## 2022-12-10 RX ORDER — LORAZEPAM 2 MG/ML
2 INJECTION INTRAMUSCULAR
Status: DISCONTINUED | OUTPATIENT
Start: 2022-12-10 | End: 2022-12-12 | Stop reason: HOSPADM

## 2022-12-10 RX ORDER — BENZTROPINE MESYLATE 1 MG/1
1 TABLET ORAL
Status: DISCONTINUED | OUTPATIENT
Start: 2022-12-10 | End: 2022-12-12 | Stop reason: HOSPADM

## 2022-12-10 RX ORDER — HYDROXYZINE 50 MG/1
100 TABLET, FILM COATED ORAL
Status: DISCONTINUED | OUTPATIENT
Start: 2022-12-10 | End: 2022-12-12 | Stop reason: HOSPADM

## 2022-12-10 RX ORDER — ESTRADIOL 1 MG/1
1.5 TABLET ORAL DAILY
Status: DISCONTINUED | OUTPATIENT
Start: 2022-12-10 | End: 2022-12-12 | Stop reason: HOSPADM

## 2022-12-10 RX ORDER — DEXTROAMPHETAMINE SACCHARATE, AMPHETAMINE ASPARTATE, DEXTROAMPHETAMINE SULFATE AND AMPHETAMINE SULFATE 2.5; 2.5; 2.5; 2.5 MG/1; MG/1; MG/1; MG/1
20 TABLET ORAL 2 TIMES DAILY
Status: DISCONTINUED | OUTPATIENT
Start: 2022-12-10 | End: 2022-12-10

## 2022-12-10 RX ORDER — HYDROCODONE BITARTRATE AND ACETAMINOPHEN 5; 325 MG/1; MG/1
1.5 TABLET ORAL EVERY 6 HOURS PRN
Status: DISCONTINUED | OUTPATIENT
Start: 2022-12-10 | End: 2022-12-12 | Stop reason: HOSPADM

## 2022-12-10 RX ORDER — DEXTROAMPHETAMINE SACCHARATE, AMPHETAMINE ASPARTATE, DEXTROAMPHETAMINE SULFATE AND AMPHETAMINE SULFATE 2.5; 2.5; 2.5; 2.5 MG/1; MG/1; MG/1; MG/1
10 TABLET ORAL 2 TIMES DAILY
Status: DISCONTINUED | OUTPATIENT
Start: 2022-12-10 | End: 2022-12-12 | Stop reason: HOSPADM

## 2022-12-10 RX ORDER — ACETAMINOPHEN 325 MG/1
650 TABLET ORAL EVERY 4 HOURS PRN
Status: DISCONTINUED | OUTPATIENT
Start: 2022-12-10 | End: 2022-12-10

## 2022-12-10 RX ORDER — DIPHENHYDRAMINE HYDROCHLORIDE 50 MG/ML
50 INJECTION INTRAMUSCULAR; INTRAVENOUS EVERY 6 HOURS PRN
Status: DISCONTINUED | OUTPATIENT
Start: 2022-12-10 | End: 2022-12-12 | Stop reason: HOSPADM

## 2022-12-10 RX ORDER — MONTELUKAST SODIUM 10 MG/1
10 TABLET ORAL
Status: DISCONTINUED | OUTPATIENT
Start: 2022-12-10 | End: 2022-12-12 | Stop reason: HOSPADM

## 2022-12-10 RX ADMIN — PRAVASTATIN SODIUM 40 MG: 40 TABLET ORAL at 17:03

## 2022-12-10 RX ADMIN — MONTELUKAST 10 MG: 10 TABLET, FILM COATED ORAL at 21:36

## 2022-12-10 RX ADMIN — SENNOSIDES AND DOCUSATE SODIUM 2 TABLET: 50; 8.6 TABLET ORAL at 17:33

## 2022-12-10 RX ADMIN — HYDROCODONE BITARTRATE AND ACETAMINOPHEN 1.5 TABLET: 5; 325 TABLET ORAL at 11:19

## 2022-12-10 RX ADMIN — PRAZOSIN HYDROCHLORIDE 10 MG: 5 CAPSULE ORAL at 21:50

## 2022-12-10 RX ADMIN — SENNOSIDES AND DOCUSATE SODIUM 2 TABLET: 50; 8.6 TABLET ORAL at 12:14

## 2022-12-10 RX ADMIN — TIZANIDINE 4 MG: 4 TABLET ORAL at 23:32

## 2022-12-10 RX ADMIN — DIAZEPAM 5 MG: 5 TABLET ORAL at 23:32

## 2022-12-10 RX ADMIN — POLYETHYLENE GLYCOL 3350 17 G: 17 POWDER, FOR SOLUTION ORAL at 23:33

## 2022-12-10 RX ADMIN — POTASSIUM CHLORIDE 10 MEQ: 750 TABLET, EXTENDED RELEASE ORAL at 17:03

## 2022-12-10 RX ADMIN — GABAPENTIN 300 MG: 300 CAPSULE ORAL at 10:21

## 2022-12-10 RX ADMIN — TIZANIDINE 4 MG: 4 TABLET ORAL at 17:04

## 2022-12-10 RX ADMIN — LIOTHYRONINE SODIUM 10 MCG: 5 TABLET ORAL at 21:35

## 2022-12-10 RX ADMIN — HYDROXYZINE HYDROCHLORIDE 100 MG: 50 TABLET, FILM COATED ORAL at 15:28

## 2022-12-10 RX ADMIN — BREXPIPRAZOLE 2 MG: 2 TABLET ORAL at 14:30

## 2022-12-10 RX ADMIN — HYDROCODONE BITARTRATE AND ACETAMINOPHEN 1.5 TABLET: 5; 325 TABLET ORAL at 21:35

## 2022-12-10 RX ADMIN — PRAZOSIN HYDROCHLORIDE 5 MG: 2 CAPSULE ORAL at 08:39

## 2022-12-10 RX ADMIN — DEXTROAMPHETAMINE SACCHARATE, AMPHETAMINE ASPARTATE, DEXTROAMPHETAMINE SULFATE AND AMPHETAMINE SULFATE 10 MG: 2.5; 2.5; 2.5; 2.5 TABLET ORAL at 17:04

## 2022-12-10 RX ADMIN — ESTRADIOL 2 MG: 1 TABLET ORAL at 21:34

## 2022-12-10 RX ADMIN — ESTRADIOL 1.5 MG: 1 TABLET ORAL at 10:20

## 2022-12-10 RX ADMIN — CELECOXIB 200 MG: 100 CAPSULE ORAL at 17:04

## 2022-12-10 RX ADMIN — CELECOXIB 200 MG: 100 CAPSULE ORAL at 10:21

## 2022-12-10 RX ADMIN — POTASSIUM CHLORIDE 10 MEQ: 750 TABLET, EXTENDED RELEASE ORAL at 10:22

## 2022-12-10 RX ADMIN — DIAZEPAM 5 MG: 5 TABLET ORAL at 17:33

## 2022-12-10 RX ADMIN — GABAPENTIN 600 MG: 300 CAPSULE ORAL at 21:35

## 2022-12-10 RX ADMIN — DEXTROAMPHETAMINE SACCHARATE, AMPHETAMINE ASPARTATE, DEXTROAMPHETAMINE SULFATE AND AMPHETAMINE SULFATE 20 MG: 2.5; 2.5; 2.5; 2.5 TABLET ORAL at 08:58

## 2022-12-10 RX ADMIN — LORATADINE 10 MG: 10 TABLET ORAL at 21:37

## 2022-12-10 NOTE — ED NOTES
Care handoff occurred at this time  Report received from Sea, 24[address] [facility]  Patient resting in room, ambulatory in room  1:1 remains at bedside for patient safety  Patient ambulatory to nurse's station multiple times and has multiple requests  Patient continues to want to talk to crisis and keeps stating, "this paperwork aint right " Patient in signs of acute distress  Respirations appear even and non labored  Will continue to monitor patient        Hilaria Parmar RN  12/09/22 3499

## 2022-12-10 NOTE — NURSING NOTE
Pt reported PRN Atarax 100 mg ineffective-she states "I told you it would not work  It never does  Even my doctor knows that " Pt requested and received PRN Valium 5 mg and reports "valium and Zanaflex works better together, that's what I have to take, not the anti-histamine"  Will monitor for effectiveness
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
## 2022-12-10 NOTE — PLAN OF CARE
Problem: PAIN - ADULT  Goal: Verbalizes/displays adequate comfort level or baseline comfort level  Description: Interventions:  - Encourage patient to monitor pain and request assistance  - Assess pain using appropriate pain scale  - Administer analgesics based on type and severity of pain and evaluate response  - Implement non-pharmacological measures as appropriate and evaluate response  - Consider cultural and social influences on pain and pain management  - Notify physician/advanced practitioner if interventions unsuccessful or patient reports new pain  Outcome: Progressing     Problem: DISCHARGE PLANNING  Goal: Discharge to home or other facility with appropriate resources  Description: INTERVENTIONS:  - Identify barriers to discharge w/patient and caregiver  - Arrange for needed discharge resources and transportation as appropriate  - Identify discharge learning needs (meds, wound care, etc )  - Arrange for interpretive services to assist at discharge as needed  - Refer to Case Management Department for coordinating discharge planning if the patient needs post-hospital services based on physician/advanced practitioner order or complex needs related to functional status, cognitive ability, or social support system  Outcome: Progressing     Problem: Knowledge Deficit  Goal: Patient/family/caregiver demonstrates understanding of disease process, treatment plan, medications, and discharge instructions  Description: Complete learning assessment and assess knowledge base    Interventions:  - Provide teaching at level of understanding  - Provide teaching via preferred learning methods  Outcome: Progressing     Problem: Risk for Self Injury/Neglect  Goal: Treatment Goal: Remain safe during length of stay, learn and adopt new coping skills, and be free of self-injurious ideation, impulses and acts at the time of discharge  Outcome: Progressing  Goal: Verbalize thoughts and feelings  Description: Interventions:  - Assess and re-assess patient's lethality and potential for self-injury  - Engage patient in 1:1 interactions, daily, for a minimum of 15 minutes  - Encourage patient to express feelings, fears, frustrations, hopes  - Establish rapport/trust with patient   Outcome: Progressing  Goal: Refrain from harming self  Description: Interventions:  - Monitor patient closely, per order  - Develop a trusting relationship  - Supervise medication ingestion, monitor effects and side effects   Outcome: Progressing     Problem: Anxiety  Goal: Anxiety is at manageable level  Description: Interventions:  - Assess and monitor patient's anxiety level  - Monitor for signs and symptoms (heart palpitations, chest pain, shortness of breath, headaches, nausea, feeling jumpy, restlessness, irritable, apprehensive)  - Collaborate with interdisciplinary team and initiate plan and interventions as ordered    - [place] patient to unit/surroundings  - Explain treatment plan  - Encourage participation in care  - Encourage verbalization of concerns/fears  - Identify coping mechanisms  - Assist in developing anxiety-reducing skills  - Administer/offer alternative therapies  - Limit or eliminate stimulants  Outcome: Progressing     Problem: SELF HARM/SUICIDALITY  Goal: Will have no self-injury during hospital stay  Description: INTERVENTIONS:  - Q 15 MINUTES: Routine safety checks  - Q WAKING SHIFT & PRN: Assess risk to determine if routine checks are adequate to maintain patient safety  - Encourage patient to participate actively in care by formulating a plan to combat response to suicidal ideation, identify supports and resources  Outcome: Progressing     Problem: DEPRESSION  Goal: Will be euthymic at discharge  Description: INTERVENTIONS:  - Administer medication as ordered  - Provide emotional support via 1:1 interaction with staff  - Encourage involvement in milieu/groups/activities  - Monitor for social isolation  Outcome: Progressing

## 2022-12-10 NOTE — NURSING NOTE
Patient requested to speak with "the person in charge "  Patient stating "I just want somebody to talk to me  That's all I want and she (referring to her nurse) wont talk to me  Patients nurse had already spent multiple hours occupied by patient  Patient started conversation by asking if her "service dog" can come to the unit "  Patient continued to go on a tangent that was disorganized and difficult to follow  She complained about the doctor "just typing and would not make eye contact  I had a hard time understanding her because of her accent and I asked her to remove her mask so I could read her lips but she wouldn't " Also asked patient what were the circumstances for her coming to the hospital  Patient could not give a definitive answer and continued on a tangent  Patient speech is pressured, she is disorganized, tangential, and labile    Patient INSISTING she is not BiPolar and BiPolar was an "old diagnosis "  She continued to emphasize she was diagnosed with DID and she was incorrectly diagnosed in the past   Patient states her "life was like a snuff film "

## 2022-12-10 NOTE — ED NOTES
Pt transport arrived at this time  Pt requesting to brush teeth and take daily medications prior to arrival  Pt explained transportion at bedside for transport  Pt given medication one at a time  Pt arguing with RN about improper medication given at this time  Pt explained by charge med pass to be continued at [facility]         Yajaira Hays RN  12/10/22 4084
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
## 2022-12-10 NOTE — ED NOTES
Pt requesting her trintellix is made aware we don't carry that medication is currently making arrangements to have someone drop that medication off for her        Nish Higgins RN  12/09/22 6118

## 2022-12-10 NOTE — ED CARE HANDOFF
Emergency Department Sign Out Note        Sign out and transfer of care from [facility]  See Separate Emergency Department note  The patient, Basim Canada, was evaluated by the previous provider for suicidal ideations  Workup Completed:  Labs Reviewed   RAPID DRUG SCREEN, URINE - Abnormal       Result Value Ref Range Status    Amph/Meth UR Positive (*) Negative Final    Barbiturate Ur Negative  Negative Final    Benzodiazepine Urine Positive (*) Negative Final    Cocaine Urine Negative  Negative Final    Methadone Urine Negative  Negative Final    Opiate Urine Positive (*) Negative Final    PCP Ur Negative  Negative Final    THC Urine Positive (*) Negative Final    Oxycodone Urine Negative  Negative Final    Narrative:     Presumptive report  If requested, specimen will be sent to reference lab for confirmation  FOR MEDICAL PURPOSES ONLY  IF CONFIRMATION NEEDED PLEASE CONTACT THE LAB WITHIN 5 DAYS  Drug Screen Cutoff Levels:  AMPHETAMINE/METHAMPHETAMINES  1000 ng/mL  BARBITURATES     200 ng/mL  BENZODIAZEPINES     200 ng/mL  COCAINE      300 ng/mL  METHADONE      300 ng/mL  OPIATES      300 ng/mL  PHENCYCLIDINE     25 ng/mL  THC       50 ng/mL  OXYCODONE      100 ng/mL   NOVEL CORONAVIRUS (COVID-19), PCR [facility] - Normal    SARS-CoV-2 Negative  Negative Final    Comment:      Narrative:     FOR PEDIATRIC PATIENTS - copy/paste COVID Guidelines URL to browser: [email or web address]    SARS-CoV-2 assay is a Nucleic Acid Amplification assay intended for the  qualitative detection of nucleic acid from SARS-CoV-2 in nasopharyngeal  swabs  Results are for the presumptive identification of SARS-CoV-2 RNA  Positive results are indicative of infection with SARS-CoV-2, the virus  causing COVID-19, but do not rule out bacterial infection or co-infection  with other viruses   Laboratories within the [place] and its  territories are required to report all positive results to the appropriate  public health authorities  Negative results do not preclude SARS-CoV-2  infection and should not be used as the sole basis for treatment or other  patient management decisions  Negative results must be combined with  clinical observations, patient history, and epidemiological information  This test has not been FDA cleared or approved  This test has been authorized by FDA under an Emergency Use Authorization  (EUA)  This test is only authorized for the duration of time the  declaration that circumstances exist justifying the authorization of the  emergency use of an in vitro diagnostic tests for detection of SARS-CoV-2  virus and/or diagnosis of COVID-19 infection under section 564(b)(1) of  the Act, 21 U  S C  259AYL-0(K)(7), unless the authorization is terminated  or revoked sooner  The test has been validated but independent review by FDA  and CLIA is pending  Test performed using [employer] GeneXpert: This RT-PCR assay targets N2,  a region unique to SARS-CoV-2  A conserved region in the E-gene was chosen  for pan-Sarbecovirus detection which includes SARS-CoV-2  According to CMS-2020-01-R, this platform meets the definition of high-throughput technology  COVID19, INFLUENZA A/B, RSV PCR, [facility] - Normal    SARS-CoV-2 Negative  Negative Final    Comment:      INFLUENZA A PCR Negative  Negative Final    Comment:      INFLUENZA B PCR Negative  Negative Final    Comment:      RSV PCR Negative  Negative Final    Comment:      Narrative:     FOR PEDIATRIC PATIENTS - copy/paste COVID Guidelines URL to browser: [email or web address]    SARS-CoV-2 assay is a Nucleic Acid Amplification assay intended for the  qualitative detection of nucleic acid from SARS-CoV-2 in nasopharyngeal  swabs  Results are for the presumptive identification of SARS-CoV-2 RNA      Positive results are indicative of infection with SARS-CoV-2, the virus  causing COVID-19, but do not rule out bacterial infection or co-infection  with other viruses  Laboratories within the [place] and its  territories are required to report all positive results to the appropriate  public health authorities  Negative results do not preclude SARS-CoV-2  infection and should not be used as the sole basis for treatment or other  patient management decisions  Negative results must be combined with  clinical observations, patient history, and epidemiological information  This test has not been FDA cleared or approved  This test has been authorized by FDA under an Emergency Use Authorization  (EUA)  This test is only authorized for the duration of time the  declaration that circumstances exist justifying the authorization of the  emergency use of an in vitro diagnostic tests for detection of SARS-CoV-2  virus and/or diagnosis of COVID-19 infection under section 564(b)(1) of  the Act, 21 U  S C  379DDM-0(E)(0), unless the authorization is terminated  or revoked sooner  The test has been validated but independent review by FDA  and CLIA is pending  Test performed using [employer] GeneXpert: This RT-PCR assay targets N2,  a region unique to SARS-CoV-2  A conserved region in the E-gene was chosen  for pan-Sarbecovirus detection which includes SARS-CoV-2  According to CMS-2020-01-R, this platform meets the definition of high-throughput technology  POCT ALCOHOL BREATH TEST - Normal    EXTBreath Alcohol 0 00   Final   POCT PREGNANCY, URINE - Normal    EXT Preg Test, Ur Negative   Final    Control Valid   Final         ED Course / Workup Pending (followup):  -Stable overnight previous provider  -Accepted to [facility], awaiting transfer                                  Procedures  MDM  Number of Diagnoses or Management Options  Depression  Suicidal ideation  Diagnosis management comments: Stable throughout time of my care through time of transfer         Amount and/or Complexity of Data Reviewed  Clinical lab tests: reviewed    Patient Progress  Patient progress: stable          Disposition  Final diagnoses:   Depression   Suicidal ideation     Time reflects when diagnosis was documented in both MDM as applicable and the Disposition within this note     Time User Action Codes Description Comment    12/7/2022  2:52 PM Nelda Marshall Add [W19  A] Depression     12/7/2022  2:52 PM Thai RANDALL Add [Z05 432] Suicidal ideation     12/9/2022 12:44 PM Irais Heath Add [Z00 8] Medical clearance for psychiatric admission       ED Disposition     ED Disposition   Transfer to [address] [place] [address]   --    Date/Time   Wed Dec 7, 2022  2:52 PM    Comment   Ilia Kearns should be transferred out to behavioral health and has been medically cleared  MD Documentation    Marco George Most Recent Value   Patient Condition The patient has been stabilized such that within reasonable medical probability, no material deterioration of the patient condition or the condition of the unborn child(ce) is likely to result from the transfer, No noted underlying medical condition requiring transfer to another facility   Transfer is per preference and request of patient and/or family   Reason for Transfer Level of Care needed not available at this facility   Benefits of Transfer Specialized equipment and/or services available at the receiving facility (Include comment)________________________   Risks of Transfer Potential for delay in receiving treatment   Accepting Physician Dr Sasha Castro Name, [address]    (Name & Tel number) Jann Kingsley [phone]   Transported by (Company and Unit #) [facility]oc[facility]ano   Sending MD Dr Tamiko Hannah   Provider Certification General risk, such as traffic hazards, adverse weather conditions, rough terrain or turbulence, possible failure of equipment (including vehicle or aircraft), or consequences of actions of persons outside the control of the transport personnel, The patient is stable for psychiatric transfer because they are medically stable, and is protected from harming him/herself or others during transport      RN Documentation    Flowsheet Row Most [address] Name, [facility] [place] [address]    (Name & Tel number) Carmela Flaherty [phone]   Transported by [employer] and Unit #) [facility]      Follow-up Information    None       Discharge Medication List as of 12/10/2022  9:07 AM      CONTINUE these medications which have NOT CHANGED    Details   albuterol (PROVENTIL HFA,VENTOLIN HFA) 90 mcg/act inhaler Inhale 2 puffs every 4 (four) hours as needed, Starting Wed 5/16/2018, Historical Med      amphetamine-dextroamphetamine (ADDERALL XR) 10 MG 24 hr capsule Take 40 mg by mouth every morning Morning  Start taking dec 13, 2022, Historical Med      amphetamine-dextroamphetamine (ADDERALL XR) 20 MG 24 hr capsule Take 10 mg by mouth in the morning Pt takes at 1pm, Starting Sat 11/30/2019, Historical Med      ARIPiprazole (ABILIFY) 10 mg tablet Take 10 mg by mouth daily, Starting Tue 12/10/2019, Historical Med      ARTIFICIAL TEARS 1 4 % ophthalmic solution Starting Wed 11/20/2019, Historical Med      B Complex-Biotin-FA (B-COMPLEX PO) Take by mouth, Historical Med      B Complex-Folic Acid (B COMPLEX-VITAMIN B12 PO) Take 1 tablet by mouth every morning, Starting Thu 12/1/2022, Historical Med      Brexpiprazole (REXULTI PO) Take 4 mg by mouth in the morning, Historical Med      diazepam (VALIUM) 10 mg tablet Take 10 mg by mouth Three times daily as needed, Starting Fri 12/20/2019, Historical Med      doxepin (SINEquan) 100 mg capsule Take 1 tab at bed, Historical Med      estradiol (ESTRACE) 1 mg tablet Take 1 mg by mouth daily 1 5 mg in morning   2 mg in pm , Historical Med      fluticasone-vilanterol (BREO ELLIPTA) 200-25 MCG/INH inhaler Inhale 1 puff daily, Starting Tue 4/23/2019, Historical Med      gabapentin (NEURONTIN) 300 mg capsule Take 300 mg by mouth 1 capsule in AM  2 capsule at night, Starting Tue 12/10/2019, Historical Med      guaiFENesin (MUCINEX) 600 mg 12 hr tablet Take 600 mg by mouth 2 (two) times a day as needed, Starting Mon 1/7/2019, Historical Med      HYDROcodone-acetaminophen ([address]) 5-325 mg per tablet every 6 (six) hours as needed, Starting Thu 10/24/2019, Historical Med      lidocaine (XYLOCAINE) 5 % ointment Apply topically daily as needed, Starting Fri 1/11/2019, Until Sat 1/11/2020 at 2359, Historical Med      liothyronine (CYTOMEL) 5 mcg tablet TWO TABLETS BY MOUTH DAILY, Historical Med      loratadine (CLARITIN) 10 mg tablet One tab po daily, Historical Med      mirtazapine (REMERON) 15 mg tablet Take 15 mg by mouth, Starting Tue 12/10/2019, Historical Med      montelukast (SINGULAIR) 10 mg tablet Take 10 mg by mouth daily at bedtime, Starting Mon 7/15/2019, Until Tue 7/14/2020, Historical Med      NYSTATIN powder Starting Thu 9/19/2019, Historical Med      polyethylene glycol (MIRALAX) 17 g packet Starting Wed 11/20/2019, Historical Med      POTASSIUM CHLORIDE PO Take 10 mEq by mouth 2 (two) times a day, Historical Med      !! prazosin (MINIPRESS) 5 mg capsule Take 5 mg by mouth 2 (two) times a day Take one in the am and 2 at night, Starting Tue 10/8/2019, Historical Med      !! prazosin (MINIPRESS) 5 mg capsule Starting Thu 11/21/2019, Historical Med      predniSONE 10 mg tablet Starting Fri 9/20/2019, Historical Med      senna-docusate sodium (SENOKOT-S) 8 6-50 mg per tablet Take 1 tablet by mouth 2 (two) times a day as needed for constipation, Historical Med      simvastatin (ZOCOR) 20 mg tablet daily at bedtime, Starting Mon 7/15/2019, Historical Med      tiZANidine (ZANAFLEX) 4 mg tablet Take 4 mg by mouth every 6 (six) hours as needed, Starting Sun 9/29/2019, Historical Med      Triamcinolone Acetonide 55 MCG/ACT AERO 2 sprays by Each Nare route daily, Starting Mon 6/24/2019, Historical Med      vortioxetine (TRINTELLIX) 10 MG tablet 10 mg daily at bedtime, Starting Tue 12/10/2019, Historical Med      Wound Dressings (VASELINE PETROLATUM GAUZE EX) Apply topically Apply to wound daily, Historical Med       !! - Potential duplicate medications found  Please discuss with provider  No discharge procedures on file         ED Provider  Electronically Signed by     Cheri Arthur PA-C  12/10/22 0170

## 2022-12-10 NOTE — H&P
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 physical, mental and social evaluations, I certify that inpatient psychiatric services are medically necessary for this patient for a duration of 10 midnights for the treatment of Bipolar I disorder, most recent episode mixed, severe with psychotic features [facility])  Available alternative community resources do not meet the patient's mental health care needs  I further attest that an established written individualized plan of care has been implemented and is outlined in the patient's medical records    The patient has been released from the Emergency Department and medically cleared as per Emergency Department documentation for psychiatric admission for Bipolar I disorder, most recent episode mixed, severe with psychotic features ([facility] [address] )      Suzanna Powell MD 12/10/22

## 2022-12-10 NOTE — ED NOTES
Pt's friend brought her Rexulti and Trintellix which were placed in medicine box at nurses station  Per Cleo Sterling pt can have night time dose of Trintelix 10 mg PO Trintelix given at this time        Rosemary Jerez, RN  12/09/22 [address] [address], RN  12/09/22 6949

## 2022-12-10 NOTE — TREATMENT PLAN
TREATMENT PLAN REVIEW - [address] 50 y o  1975 female MRN: [id number]    [address] [address] Room / Bed: [address] [address]/[facility] 341-01 Encounter: [id number]        Admit Date/Time:  12/10/2022  9:18 AM    Treatment Team: Attending Provider: Robina Velasquez MD; Registered Nurse: Jacquelyn Yarbrough RN    Diagnosis: Principal Problem:    Bipolar I disorder, most recent episode mixed, severe with psychotic features [facility])  Active Problems:    Post-traumatic stress disorder, chronic    Dissociative identity disorder [facility])    ADHD (attention deficit hyperactivity disorder), combined type    Cann[facility] dependence, continuous ([facility] )      Patient Strengths/Assets: negotiates basic needs, patient is on a voluntary commitment, stable housing      Patient Barriers/Limitations: chronic mental illness, difficulty adapting, poor past treatment response, poor reasoning ability, poor support system, resistance to treatment    Short Term Goals: decrease in paranoid thoughts, decrease in suicidal thoughts, decrease in frequency of self abusive behaviors, decrease in homicidal thoughts, improvement in insight, improvement in reasoning ability, tolerate medications, mood stabilization    Long Term Goals: stabilization of mood, free of suicidal thoughts, free of homicidal thoughts, improved reasoning ability, improved impulse control, improved insight, acceptance of need for psychiatric medications, adequate sleep    Progress Towards Goals: restarting psychiatric medications as prescribed    Recommended Treatment: medication management, patient medication education, group therapy, milieu therapy, continued Behavioral Health psychiatric evaluation/assessment process     Treatment Frequency: daily medication monitoring, group and milieu therapy daily, monitoring through interdisciplinary rounds, monitoring through weekly patient care conferences    Expected Discharge Date: 10 days - 12/20/2022    Discharge Plan: referral for outpatient medication management with a psychiatrist, referral for outpatient psychotherapy, return to previous living arrangement    Treatment Plan Created/Updated By: Alisia Nichols MD

## 2022-12-11 PROBLEM — Z00.8 MEDICAL CLEARANCE FOR PSYCHIATRIC ADMISSION: Status: ACTIVE | Noted: 2022-12-11

## 2022-12-11 PROBLEM — T30.0 BURN: Status: ACTIVE | Noted: 2022-12-11

## 2022-12-11 RX ORDER — PRAVASTATIN SODIUM 40 MG
40 TABLET ORAL
Status: DISCONTINUED | OUTPATIENT
Start: 2022-12-11 | End: 2022-12-11

## 2022-12-11 RX ADMIN — B-COMPLEX W/ C & FOLIC ACID TAB 1 TABLET: TAB at 09:19

## 2022-12-11 RX ADMIN — DIAZEPAM 5 MG: 5 TABLET ORAL at 12:36

## 2022-12-11 RX ADMIN — LIOTHYRONINE SODIUM 10 MCG: 5 TABLET ORAL at 21:11

## 2022-12-11 RX ADMIN — HYDROXYZINE HYDROCHLORIDE 100 MG: 50 TABLET, FILM COATED ORAL at 11:28

## 2022-12-11 RX ADMIN — HYDROCODONE BITARTRATE AND ACETAMINOPHEN 1.5 TABLET: 5; 325 TABLET ORAL at 21:33

## 2022-12-11 RX ADMIN — SENNOSIDES AND DOCUSATE SODIUM 2 TABLET: 50; 8.6 TABLET ORAL at 21:17

## 2022-12-11 RX ADMIN — DEXTROAMPHETAMINE SACCHARATE, AMPHETAMINE ASPARTATE, DEXTROAMPHETAMINE SULFATE AND AMPHETAMINE SULFATE 10 MG: 2.5; 2.5; 2.5; 2.5 TABLET ORAL at 09:19

## 2022-12-11 RX ADMIN — ESTRADIOL 1.5 MG: 1 TABLET ORAL at 09:19

## 2022-12-11 RX ADMIN — HYDROXYZINE HYDROCHLORIDE 100 MG: 50 TABLET, FILM COATED ORAL at 18:31

## 2022-12-11 RX ADMIN — PRAZOSIN HYDROCHLORIDE 5 MG: 5 CAPSULE ORAL at 09:18

## 2022-12-11 RX ADMIN — POTASSIUM CHLORIDE 10 MEQ: 750 TABLET, EXTENDED RELEASE ORAL at 09:20

## 2022-12-11 RX ADMIN — SENNOSIDES AND DOCUSATE SODIUM 2 TABLET: 50; 8.6 TABLET ORAL at 10:25

## 2022-12-11 RX ADMIN — LORATADINE 10 MG: 10 TABLET ORAL at 21:12

## 2022-12-11 RX ADMIN — POTASSIUM CHLORIDE 10 MEQ: 750 TABLET, EXTENDED RELEASE ORAL at 17:15

## 2022-12-11 RX ADMIN — CELECOXIB 200 MG: 100 CAPSULE ORAL at 09:19

## 2022-12-11 RX ADMIN — GABAPENTIN 300 MG: 300 CAPSULE ORAL at 09:19

## 2022-12-11 RX ADMIN — BREXPIPRAZOLE 4 MG: 4 TABLET ORAL at 09:19

## 2022-12-11 RX ADMIN — PRAVASTATIN SODIUM 40 MG: 40 TABLET ORAL at 17:15

## 2022-12-11 RX ADMIN — TIZANIDINE 4 MG: 4 TABLET ORAL at 11:28

## 2022-12-11 RX ADMIN — DIAZEPAM 5 MG: 5 TABLET ORAL at 21:33

## 2022-12-11 RX ADMIN — TIZANIDINE 4 MG: 4 TABLET ORAL at 18:30

## 2022-12-11 RX ADMIN — GABAPENTIN 600 MG: 300 CAPSULE ORAL at 21:11

## 2022-12-11 RX ADMIN — ESTRADIOL 2 MG: 1 TABLET ORAL at 21:12

## 2022-12-11 RX ADMIN — CELECOXIB 200 MG: 100 CAPSULE ORAL at 17:15

## 2022-12-11 RX ADMIN — HYDROCODONE BITARTRATE AND ACETAMINOPHEN 1.5 TABLET: 5; 325 TABLET ORAL at 09:17

## 2022-12-11 RX ADMIN — DEXTROAMPHETAMINE SACCHARATE, AMPHETAMINE ASPARTATE, DEXTROAMPHETAMINE SULFATE AND AMPHETAMINE SULFATE 10 MG: 2.5; 2.5; 2.5; 2.5 TABLET ORAL at 17:15

## 2022-12-11 RX ADMIN — MUPIROCIN: 20 OINTMENT TOPICAL at 09:42

## 2022-12-11 RX ADMIN — PRAZOSIN HYDROCHLORIDE 10 MG: 5 CAPSULE ORAL at 21:11

## 2022-12-11 RX ADMIN — MONTELUKAST 10 MG: 10 TABLET, FILM COATED ORAL at 21:12
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
## 2022-12-11 NOTE — PROGRESS NOTES
Patient is visible in the milieu  She has multiple requests for a multitude of different things ie  Medication changes, personal belongings and dressing change supplies  Medication and meal complaint  Patient did approach the nurses and MHTs multiple times with many different requests and had to be redirected due to staff splitting behaviors  Continue to monitor

## 2022-12-11 NOTE — PLAN OF CARE
Problem: SAFETY, RESTRAINT - VIOLENT/SELF-DESTRUCTIVE  Goal: Remains free of harm/injury from restraints (Restraint for Violent/Self-Destructive Behavior)  Description: INTERVENTIONS:  - Instruct patient/family regarding restraint use   - Assess and monitor physiologic and psychological status   - Provide interventions and comfort measures to meet assessed patient needs   - Ensure continuous in person monitoring is provided   - Identify and implement measures to help patient regain control  - Assess readiness for release of restraint  Outcome: Progressing

## 2022-12-11 NOTE — NURSING NOTE
Patient denies AH but states "Altered hallucinations" which is most likely referring to the patients diagnosis of DID  Denies VH  She states she has SI all the time since she was a child, but no plan and has never wanted to actually "end it" but just to make the pain "go away" but would never attempt to try  She verbally contracts for safety  She states she had a TBI in the past and that is why people get frustrated with her because they have to repeat themselves, or it is why she gets agitated easily  Patient having a large amount of frequent requests for shower supplies, wanting to wear special shoes in the shower, wanting a shower chair, wanting medication changes because "nothing is right, and the times are all wrong, it will cause my body to get thrown out of wack because they are at different times than I normally take  What kind of hospital doesn't have these meds?" Patient asking for soda or something sweet overnight because "I can tell when I get low blood sugar, my gums turn white " Patient given soda for bed, and miralax prn for constipation  This RN Spent roughly an hour and 10 minutes trying to administer medications to patient and providing education about unit policy and limit setting  She had attempted to staff split and was loudly saying "That tech, the 1year old bitch she just walked away from me when I was asking for my stuff " This RN educated her that we will not tolerate talking that way to staff whatsoever, and this RN wants to hear her complaints but in the proper manner, and will talk to staff member       Patient wants to know why she is not receiving doxepin as it was given [place] [facility] [place], she states that is the only thing that works for her as well as concerned her trintillex might be missing because her friend brought it into her at [facility] (was noted to be on chart there as an order per ED notes for 10mg PO HS)     Patient offered trazodone 50mg for sleep as she states "How am I going to be able to sleep without doxapine?" patient stated "It made me want to pass out, not like pass out, but I felt funny  It's hard to describe  I'm allergic to it "  Melatonin refused, stated "it does not work for me"     Vital signs stable, and no further needs

## 2022-12-11 NOTE — PROGRESS NOTES
Patient approached the nurses station and requested her valium and zanaflex  This writer read her order to her which states she is to try the atarax first and then if that does not work she can have the valium  So she was given 100mg of atarax and 4mg of zanaflex and then stated  "I'll be back in 1 hr for the valium because I know this atarax is not going to work"

## 2022-12-11 NOTE — NURSING NOTE
Patient given norco 5-325mg for pain 8/10 generalized, and reporting muscle spasms  Patient asking for zanaflex and valium  RN stated that because of the cumulative sedative effect, norco can be given now and at least an hour in between the zanaflex and valium  22:35: medication effective - patient observed sleeping  Patient asking for basin so she can wash her face, basin given

## 2022-12-11 NOTE — PROGRESS NOTES
Progress Note - Behavioral Health   Therroshan Kearns 50 y o  female MRN: [id number]  Unit/Bed#: [facility] 341-01 Encounter: [id number]    Assessment/Plan   Principal Problem:    Bipolar I disorder, most recent episode mixed, severe with psychotic features ([facility] )  Active Problems:    Post-traumatic stress disorder, chronic    Dissociative [facility] disorder ([facility] )    ADHD (attention deficit hyperactivity disorder), combined type    Cannabis dependence, continuous ([facility] )    Borderline personality disorder ([facility] )      Recommended Treatment:     1  72 hour notice signed at [id number] on 12/10/2022 and refusing to rescind  2  Increase Rexulti to 4 mg daily for mood  3  Continue with current medications:  a  Neurontin 300 mg daily and 600 mg QHS for anxiety, agitation, and nerve pain  b  Prazosin 5 mg daily and 10 mg QHS at bedtime for PTSD   c  Melatonin 3 mg for sleep   d  Continue gradual tapering of Adderall to 10 mg BID due to potential to further mood instability  e  Continue to hold Trintellix due to unstable mood and can consider restarting once mood is more stable  4 Continue with group therapy, milieu therapy and occupational therapy  5  Continue frequent safety checks and vitals per unit protocol  6  Continue with SLIM medical management as indicated  7  Continue coordinating with case management regarding disposition    Case discussed with treatment team   Risks, benefits and possible side effects of Medications: Risks, benefits, and possible side effects of medications could not be explained to the patient due to inability to tolerate interview    Legal Status: 201      ------------------------------------------------------------    Subjective: Per nursing report, Erin Javed has been uncooperative and noncompliant with medications  Yesterday afternoon, patient reported severe anxiety and was given Atarax 100 mg PRN which was ineffective and later requested for Valium   Staff reports patient has been tangential, disorganized, and complaining about her care in the hospital  Patient's RN received a call from patient's outpatient therapist yesterday about patient's treatment, please refer to nursing note on 12/10 at 450 PM for further detail  During the evening shift, patient was given Norco 5-325 mg for generalized pain 8/10 and muscle spasms which was effective  Per nursing, patient has been insistent that her medications and diagnosis are wrong and told staff that she was diagnosed with "DID" and had a "TBI " Staff provided through education about her medications and unit policy; however, patient has attempted to split staff and has been disrespectful  Ilia was evaluated this morning for continuing care  On examination, Thera is irritable, difficult to follow, and tangenital  She states her mood is "anxious and mad" and complains that she wants to leave  She reports she signed a 72 hour notice and does not understand why she is not on her previous medication regimen despite her current presentation  She states, "I'm not bipolar and I have DID so why am I not getting my Valium, Adderall, and Doxepin?" She reports sleeping poorly and requests for Doxepin multiple times and states that the melatonin does not work for her and Atarax is an "antihistamine  Why would that work? My psychiatrist knows it doesn't " Discussed with patient that she can clarify further with the treatment team tomorrow regarding medication changes  Her appetite has been adequate  Today, Rex Will is [place] for safety on the unit  She denies suicidal ideation, homicidal ideation, and visual hallucinations though endorses hearing "alters" which she states are part of her "DID  I have so many  You're not even talking to [facility]" however, she was unable to expand further and appeared distracted at times during the evaluation       PRNs overnight: Valium 5 mg at 1733 and 2332, Norco 5-325 mg at 2135, Zanaflex 4 mg at 2332  VS: Reviewed, within normal limits    Progress Toward Goals: regressed, patient has been easily agitated and refusing medications  Signed a 72 hour notice and not willing to rescind it  Psychiatric Review of Systems:  Behavior over the last 24 hours: regressed  Sleep: difficulty falling asleep  Appetite: adequate  Medication side effects: as noted above  Medical ROS: Complete review of systems is negative except as noted above      Vital signs in last 24 hours:  Temp:  [97 3 °F (36 3 °C)] 97 3 °F (36 3 °C)  HR:  [80] 80  Resp:  [16] 16  BP: (144-191)/() 146/84    Mental Status Exam:  Appearance:  alert, good eye contact, appears stated age, casually dressed and obese   Behavior:  cooperative, easily agitated    Motor: restless and fidgety   Speech:  pressured, loud, difficult to interrupt, hyperverbal and coherent   Mood:  "anxious and mad"   Affect:  labile   Thought Process:  perseverative, tangential   Thought Content: paranoid ideation   Perceptual disturbances: auditory hallucinations reports she has "alters" which are part of her dissociate identity disorder, visual hallucinations denies and appears distracted at times   Risk Potential: No active or passive suicidal or homicidal ideation was verbalized during interview, Potential for aggression due to poor impulse control   Cognition: oriented to self and situation, appears to be of average intelligence and cognition not formally tested   Insight:  Poor   Judgment: Poor     Current Medications:  Current Facility-Administered Medications   Medication Dose Route Frequency Provider Last Rate   • acetaminophen  650 mg Oral Q6H PRN Renzo Joy MD     • acetaminophen  975 mg Oral Q6H PRN Dorothy Villalba MD     • albuterol  2 puff Inhalation Q4H PRN Dorothy Villalba MD     • aluminum-magnesium hydroxide-simethicone  30 mL Oral Q4H PRN Renzo Joy MD     • amphetamine-dextroamphetamine  10 mg Oral BID Dorothy Villalba MD     • haloperidol lactate  2 5 mg Intramuscular Q6H PRN Max 4/day Mia Díaz MD      And   • LORazepam  1 mg Intramuscular Q6H PRN Max 4/day Mia Díaz MD      And   • benztropine  0 5 mg Intramuscular Q6H PRN Max 4/day Mia Díaz MD     • haloperidol lactate  5 mg Intramuscular Q4H PRN Max 4/day Mia Díaz MD      And   • LORazepam  2 mg Intramuscular Q4H PRN Max 4/day Mia Díaz MD      And   • benztropine  1 mg Intramuscular Q4H PRN Max 4/day Mia Díaz MD     • benztropine  1 mg Intramuscular Q4H PRN Max 6/day Mia Díaz MD     • benztropine  1 mg Oral Q4H PRN Max 6/day Mia Díaz MD     • Brexpiprazole  4 mg Oral Daily Ruben Bess MD     • celecoxib  200 mg Oral BID Mia Díaz MD     • diazepam  5 mg Oral TID PRN Ruben Bess MD     • hydrOXYzine HCL  50 mg Oral Q6H PRN Max 4/day Mia Díaz MD      Or   • diphenhydrAMINE  50 mg Intramuscular Q6H PRN Mia Díaz MD     • estradiol  1 5 mg Oral Daily Mia Díaz MD     • estradiol  2 mg Oral Daily Mia Díaz MD     • gabapentin  300 mg Oral QAM Mia Díaz MD     • gabapentin  600 mg Oral HS Mia Díaz MD     • glycerin-hypromellose-  1 drop Both Eyes Q3H PRN Mia Díaz MD     • guaiFENesin  600 mg Oral Q12H PRN Mia Díaz MD     • haloperidol  2 mg Oral Q4H PRN Max 6/day Mia Díaz MD     • haloperidol  5 mg Oral Q6H PRN Max 4/day Mia Díaz MD     • haloperidol  5 mg Oral Q4H PRN Max 4/day Mia Díaz MD     • HYDROcodone-acetaminophen  1 5 tablet Oral Q6H PRN Ruben Bess MD     • hydrOXYzine HCL  100 mg Oral Q6H PRN Max 4/day Mia Díaz MD      Or   • LORazepam  2 mg Intramuscular Q6H PRN Mia Díaz MD     • hydrOXYzine HCL  25 mg Oral Q6H PRN Max 4/day Mia Díaz MD     • lidocaine  1 application Topical 4x Daily PRN Erum Hale MD     • liothyronine  10 mcg Oral Daily Agustina Collado MD     • loratadine  10 mg Oral HS Agustina Collado MD     • melatonin  3 mg Oral HS Erum Hale MD     • montelukast  10 mg Oral HS Agustina Collado MD     • multivitamin stress formula  1 tablet Oral Daily Agustina Collado MD     • mupirocin   Topical Daily Lawyer Peyton MD     • polyethylene glycol  17 g Oral Daily PRN Agustina Collado MD     • potassium chloride  10 mEq Oral BID Agustina Collado MD     • pravastatin  40 mg Oral Daily With Kristin Price MD     • prazosin  10 mg Oral HS Agustina Collado MD     • prazosin  5 mg Oral Daily Agustina Collado MD     • propranolol  10 mg Oral Q8H PRN Agustina Collado MD     • senna-docusate sodium  2 tablet Oral BID PRN Agustina Collado MD     • tiZANidine  4 mg Oral Q6H PRN Agustina Collado MD     • traZODone  50 mg Oral HS PRN Agustina Collado MD     • Triamcinolone Acetonide  2 spray Each Nare Daily Erum Hale MD         Behavioral Health Medications: all current active meds have been reviewed  Changes as in plan section above  Laboratory results:  I have personally reviewed all pertinent laboratory/tests results     Recent Results (from the past 48 hour(s))   FLU/RSV/COVID - if FLU/RSV clinically relevant    Collection Time: 12/09/22  1:24 PM    Specimen: Nose; Nares   Result Value Ref Range    SARS-CoV-2 Negative Negative    INFLUENZA A PCR Negative Negative    INFLUENZA B PCR Negative Negative    RSV PCR Negative Negative   Comprehensive metabolic panel    Collection Time: 12/10/22  1:08 PM   Result Value Ref Range    Sodium 137 135 - 147 mmol/L    Potassium 4 7 3 5 - 5 3 mmol/L    Chloride 104 96 - 108 mmol/L    CO2 28 21 - 32 mmol/L    ANION GAP 5 5 - 14 mmol/L    BUN 16 5 - 25 mg/dL    Creatinine 0 68 0 60 - 1 20 mg/dL    Glucose 150 (H) 70 - 99 mg/dL    Calcium 9 5 8 4 - 10 2 mg/dL    AST 34 14 - 36 U/L    ALT 20 <35 U/L    Alkaline Phosphatase 62 43 - 122 U/L    Total Protein 7 7 6 4 - 8 4 g/dL    Albumin 4 3 3 5 - 5 0 g/dL    Total Bilirubin 0 47 0 20 - 1 00 mg/dL    eGFR 103 ml/min/1 73sq m   CBC and differential    Collection Time: 12/10/22  1:08 PM   Result Value Ref Range    WBC 6 27 4 31 - 10 16 Thousand/uL    RBC 4 65 3 81 - 5 12 Million/uL    Hemoglobin 14 4 11 5 - 15 4 g/dL    Hematocrit 45 8 34 8 - 46 1 %    MCV 99 (H) 82 - 98 fL    MCH 31 0 26 8 - 34 3 pg    MCHC 31 4 31 4 - 37 4 g/dL    RDW 11 5 (L) 11 6 - 15 1 %    MPV 8 8 (L) 8 9 - 12 7 fL    Platelets 507 262 - 049 Thousands/uL    nRBC 0 /100 WBCs    Neutrophils Relative 55 43 - 75 %    Immat GRANS % 0 0 - 2 %    Lymphocytes Relative 34 14 - 44 %    Monocytes Relative 8 4 - 12 %    Eosinophils Relative 2 0 - 6 %    Basophils Relative 1 0 - 1 %    Neutrophils Absolute 3 44 1 85 - 7 62 Thousands/µL    Immature Grans Absolute 0 01 0 00 - 0 20 Thousand/uL    Lymphocytes Absolute 2 16 0 60 - 4 47 Thousands/µL    Monocytes Absolute 0 47 0 17 - 1 22 Thousand/µL    Eosinophils Absolute 0 15 0 00 - 0 61 Thousand/µL    Basophils Absolute 0 04 0 00 - 0 10 Thousands/µL   TSH, 3rd generation with Free T4 reflex    Collection Time: 12/10/22  1:08 PM   Result Value Ref Range    TSH 3RD GENERATON 4 940 (H) 0 450 - 4 500 uIU/mL   Vitamin B12    Collection Time: 12/10/22  1:08 PM   Result Value Ref Range    Vitamin B-12 446 100 - 900 pg/mL   Folate    Collection Time: 12/10/22  1:08 PM   Result Value Ref Range    Folate >20 0 (H) 3 1 - 17 5 ng/mL   Vitamin D 25 hydroxy    Collection Time: 12/10/22  1:08 PM   Result Value Ref Range    Vit D, 25-Hydroxy 14 6 (L) 30 0 - 100 0 ng/mL   Lipid panel    Collection Time: 12/10/22  1:08 PM   Result Value Ref Range    Cholesterol 251 (H) See Comment mg/dL    Triglycerides 130 See Comment mg/dL    HDL, Direct 74 >=50 mg/dL    LDL Calculated 151 (H) <130 mg/dL    Non-HDL-Chol (CHOL-HDL) 177 mg/dl   T4, free    Collection Time: 12/10/22  1:08 PM   Result Value Ref Range    Free T4 0 72 (L) 0 76 - 1 46 ng/dL        This note has been constructed using a voice recognition system  There may be translation, syntax, or grammatical errors  If you have any questions, please contact the dictating author      Benjamín Clarke, DO  Psychiatry Residency, PGY-II

## 2022-12-11 NOTE — PROGRESS NOTES
Patient approached the nurse's station at 1227 to ask for the valium  She was told she had to waiti ten minutes, she sat and watched the clock until the 10 mins were up and then came back to the nurses station for the valium again  She was asked if she tried coping skills and not just using the medications  She stated "I tried the headphones that isn't working, I want the valium"  5 mg valium given at 1236

## 2022-12-11 NOTE — NURSING NOTE
: Patient given zanaflex 4mg and valium 5mg for muscle spasms    0032: Medication effective, patient observed resting in bed comfortably

## 2022-12-11 NOTE — ASSESSMENT & PLAN NOTE
· Patient is medically cleared for admission to [facility] and treatment of underlying psychiatric illness
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
## 2022-12-12 VITALS
BODY MASS INDEX: 31.25 KG/M2 | WEIGHT: 155 LBS | RESPIRATION RATE: 16 BRPM | HEART RATE: 94 BPM | OXYGEN SATURATION: 98 % | SYSTOLIC BLOOD PRESSURE: 138 MMHG | DIASTOLIC BLOOD PRESSURE: 90 MMHG | TEMPERATURE: 97.3 F | HEIGHT: 59 IN

## 2022-12-12 LAB — RPR SER QL: NORMAL

## 2022-12-12 RX ORDER — LORATADINE 10 MG/1
10 TABLET ORAL
Refills: 0
Start: 2022-12-12

## 2022-12-12 RX ORDER — GABAPENTIN 300 MG/1
300 CAPSULE ORAL EVERY MORNING
Refills: 0
Start: 2022-12-13

## 2022-12-12 RX ORDER — DIAZEPAM 5 MG/1
5 TABLET ORAL 3 TIMES DAILY PRN
Refills: 0
Start: 2022-12-12 | End: 2022-12-22

## 2022-12-12 RX ORDER — PRAVASTATIN SODIUM 40 MG
40 TABLET ORAL
Refills: 0
Start: 2022-12-12

## 2022-12-12 RX ORDER — HYDROCODONE BITARTRATE AND ACETAMINOPHEN 5; 325 MG/1; MG/1
1.5 TABLET ORAL EVERY 6 HOURS PRN
Refills: 0
Start: 2022-12-12 | End: 2022-12-22

## 2022-12-12 RX ORDER — POTASSIUM CHLORIDE 750 MG/1
10 TABLET, EXTENDED RELEASE ORAL 2 TIMES DAILY
Refills: 0
Start: 2022-12-12

## 2022-12-12 RX ORDER — TIZANIDINE 4 MG/1
4 TABLET ORAL EVERY 6 HOURS PRN
Refills: 0
Start: 2022-12-12

## 2022-12-12 RX ORDER — MONTELUKAST SODIUM 10 MG/1
10 TABLET ORAL
Qty: 30 TABLET | Refills: 0
Start: 2022-12-12 | End: 2023-12-12

## 2022-12-12 RX ORDER — PRAZOSIN HYDROCHLORIDE 5 MG/1
10 CAPSULE ORAL
Refills: 0
Start: 2022-12-12

## 2022-12-12 RX ORDER — LIOTHYRONINE SODIUM 5 UG/1
10 TABLET ORAL DAILY
Refills: 0
Start: 2022-12-12

## 2022-12-12 RX ORDER — PRAZOSIN HYDROCHLORIDE 5 MG/1
5 CAPSULE ORAL DAILY
Refills: 0
Start: 2022-12-13

## 2022-12-12 RX ORDER — DEXTROAMPHETAMINE SACCHARATE, AMPHETAMINE ASPARTATE, DEXTROAMPHETAMINE SULFATE AND AMPHETAMINE SULFATE 2.5; 2.5; 2.5; 2.5 MG/1; MG/1; MG/1; MG/1
10 TABLET ORAL 2 TIMES DAILY
Qty: 20 TABLET | Refills: 0
Start: 2022-12-12 | End: 2022-12-22

## 2022-12-12 RX ORDER — LANOLIN ALCOHOL/MO/W.PET/CERES
3 CREAM (GRAM) TOPICAL
Refills: 0
Start: 2022-12-12

## 2022-12-12 RX ORDER — ESTRADIOL 0.5 MG/1
1.5 TABLET ORAL DAILY
Refills: 0
Start: 2022-12-13

## 2022-12-12 RX ORDER — ALBUTEROL SULFATE 90 UG/1
2 AEROSOL, METERED RESPIRATORY (INHALATION) EVERY 4 HOURS PRN
Refills: 0
Start: 2022-12-12

## 2022-12-12 RX ORDER — ESTRADIOL 2 MG/1
2 TABLET ORAL DAILY
Refills: 0
Start: 2022-12-12

## 2022-12-12 RX ORDER — LIDOCAINE 50 MG/G
1 OINTMENT TOPICAL 4 TIMES DAILY PRN
Qty: 35.44 G | Refills: 0
Start: 2022-12-12

## 2022-12-12 RX ORDER — TRIAMCINOLONE ACETONIDE 55 UG/1
2 SPRAY, METERED NASAL DAILY
Qty: 16.9 ML | Refills: 0
Start: 2022-12-12

## 2022-12-12 RX ORDER — CELECOXIB 200 MG/1
200 CAPSULE ORAL 2 TIMES DAILY
Refills: 0
Start: 2022-12-12

## 2022-12-12 RX ORDER — GABAPENTIN 300 MG/1
600 CAPSULE ORAL
Refills: 0
Start: 2022-12-12

## 2022-12-12 RX ADMIN — MUPIROCIN: 20 OINTMENT TOPICAL at 08:14

## 2022-12-12 RX ADMIN — ESTRADIOL 1.5 MG: 1 TABLET ORAL at 08:18

## 2022-12-12 RX ADMIN — B-COMPLEX W/ C & FOLIC ACID TAB 1 TABLET: TAB at 08:14

## 2022-12-12 RX ADMIN — GABAPENTIN 300 MG: 300 CAPSULE ORAL at 08:15

## 2022-12-12 RX ADMIN — PRAZOSIN HYDROCHLORIDE 5 MG: 5 CAPSULE ORAL at 08:14

## 2022-12-12 RX ADMIN — BREXPIPRAZOLE 4 MG: 4 TABLET ORAL at 08:15

## 2022-12-12 RX ADMIN — POTASSIUM CHLORIDE 10 MEQ: 750 TABLET, EXTENDED RELEASE ORAL at 08:14

## 2022-12-12 RX ADMIN — CELECOXIB 200 MG: 100 CAPSULE ORAL at 08:14

## 2022-12-12 RX ADMIN — HYDROCODONE BITARTRATE AND ACETAMINOPHEN 1.5 TABLET: 5; 325 TABLET ORAL at 08:21

## 2022-12-12 RX ADMIN — DEXTROAMPHETAMINE SACCHARATE, AMPHETAMINE ASPARTATE, DEXTROAMPHETAMINE SULFATE AND AMPHETAMINE SULFATE 10 MG: 2.5; 2.5; 2.5; 2.5 TABLET ORAL at 08:15

## 2022-12-12 NOTE — BH TRANSITION RECORD
Contact Information: If you have any questions, concerns, pended studies, tests and/or procedures, or emergencies regarding your inpatient behavioral health visit  Please contact [facility] behavioral health unit [facility] [phone] and ask to speak to a , nurse or physician  A contact is available 24 hours/ 7 days a week at this number  Summary of Procedures Performed During your Stay:  Below is a list of major procedures performed during your hospital stay and a summary of results:  - No major procedures performed  If studies are pending at discharge, follow up with your PCP and/or referring provider
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
## 2022-12-12 NOTE — PROGRESS NOTES
12/12/22 1341   Team Meeting   Meeting Type Tx Team Meeting   Team Members Present   Team Members Present Physician;Nurse;   Physician Team Member [facility] Team Member [facility]   Care Management Team Member [place]   Patient/Family Present   Patient Present Yes   Patient's Family Present No     Pt seen for tx team meeting  Pt educated on med management, case management and group therapy  Pt with irritable edge and blaming staff for medication errors and poor tx  Pt refusing to sign tx plan as she reports she does not have Bipolar Disorder and does not have any anxiety problems

## 2022-12-12 NOTE — DISCHARGE INSTR - APPOINTMENTS
Ray Pulido or Nara, our [employer], will be calling you after your discharge, on the phone number that you provided  They will be available as an additional support, if needed  If you wish to speak with one of them, you may contact Anahy Phillips at [phone] or Magaly Henao at [phone]

## 2022-12-12 NOTE — NURSING NOTE
Patient continued to be labile  She was overheard telling the BHT during snack that she had been crying in her room for 4 hours and no one had come to check on her  Of note patient has been in and out of her room for meals and to use the phone and has not spoken any concerns to this nurse  Shortly after she tried to get back on the phone after just using it and this nurse advised her again of the unit rules, she accuses this nurse of only saying that because I overheard her complaint  She then comes and demands all her meds and was advised they were too early, she tells this nurse "you should go work at [employer]" implying I could not do my job  She then asks another nurse to give her a form so she can file a complaint  Patient then got upset because she asked for valium for anxiety and was advised it was no ordered for anxiety  She was advised it was ordered for muscled spasms, she then asked it to be given for that, she got Norco at the same time  She then asked if it was too early for her Zanaflex  When given her meds patient complains and is tearful because she does not have her doxepin and trintellix ordered, and because her valium was ordered wrong  She said she is also scared because her friends is going in for surgery and she is not sure if she is going to make it and she just wants to talk on the phone with her  This nurse allowed her to use the phone one last time for the night
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
## 2022-12-12 NOTE — PLAN OF CARE
CM left  for Katherine Mercedes, [address] [facility] (P: [phone]) to notify of admission and dc today  CM spoke with Nivia Horn, therapist at [facility] (V:[phone])  Pt has follow up scheduled on 12/14 at 1000 and on 12/16 at 1000  CM contacted [facility]  Pt has ALEX follow up on 12/16 at 1600  Pt has follow up med management appt on 12/19 at 1330
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
## 2022-12-12 NOTE — NURSING NOTE
Patient monopolizing the phone during her the shift  At one point on the phone for an hour and a half  She was advised there was a 10 minute phone limit so others can use the phone  She then gets back on the phone and complains about the fact that staff is timing her  She mumbles that the nurses do not care about her  When asked why she made this comment she states she didn't  This nurse then advised her she needed a break from the phone as she was not being compliant about the rules, she then says "this is why I said you guys didn't care "  She is labile ranging from pleasant to irritable and argumentative, she was tearful at one point and talked with an MHT for therapeutic support  She is confrontational with staff arguing about unit rules  At one point this nurse advised her she can only get water outside of meals and snack time, she got cranberry juice from someone else and she walks by the nurses station and states "mmmm cranberry juice", and looks directly at this nurse

## 2022-12-12 NOTE — PROGRESS NOTES
Patient Intake   Living Arrangement Lives alone   Can patient return home Yes   Address to discharge to [address] [place], [place] [address]   Patient's Telephone Number [phone]   Access to firearms Denies   Type of work [address] [employer] grade/year [employer] grad   Marital Status/Children Single   Admission Status    [facility]   Patient History   Stressor/Trigger Chronic mental health   Treatment History Hx of inpatient psych admissions   Current psychiatrist/therapist Psychiatrist: [address] and [facility]- Dr Halie Abreu Counseling- [place] Risk   Suicide Attempts Denies hx of SA  Hx of self harm, most recent burning self   Family History of Mental Health unknown   ACT/ICM [place] [facility]- Cara   Legal Issues Denies   Substance Abuse UDS + Amph/meth, benzos, opiates, THC  Pt prescribed  Pt reports daily alcohol of one to two drinks  Denies TOB   Quit in 2009   Trauma/Losses Hx of trauma  Psychosocial loss of brother       Releases of Information  [employer], [place], [address] and [place]
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
## 2022-12-12 NOTE — PLAN OF CARE
Pt to dc today off of 72 hour notice  Pt denies SI/HI, AVH  Pt oriented x3  Pt to return home and will be transported via lyft  Pt to follow up with [facility]ing on 12/14 at 1000 and on 12/16 at 1000  Pt to follow up with [facility] mental health clinic for med management on 12/16 at 1600 and on 12/19 at 1330  Pt to follow up with [facility] address: [address] [place] [address]  P: [phone]

## 2023-06-16 ENCOUNTER — APPOINTMENT (OUTPATIENT)
Dept: RADIOLOGY | Facility: MEDICAL CENTER | Age: 48
End: 2023-06-16
Payer: MEDICARE

## 2023-06-16 ENCOUNTER — OFFICE VISIT (OUTPATIENT)
Dept: OBGYN CLINIC | Facility: MEDICAL CENTER | Age: 48
End: 2023-06-16
Payer: MEDICARE

## 2023-06-16 VITALS
HEART RATE: 72 BPM | SYSTOLIC BLOOD PRESSURE: 137 MMHG | HEIGHT: 59 IN | DIASTOLIC BLOOD PRESSURE: 74 MMHG | BODY MASS INDEX: 31.31 KG/M2

## 2023-06-16 DIAGNOSIS — M25.551 PAIN IN RIGHT HIP: ICD-10-CM

## 2023-06-16 DIAGNOSIS — M16.11 PRIMARY OSTEOARTHRITIS OF ONE HIP, RIGHT: Primary | ICD-10-CM

## 2023-06-16 PROCEDURE — 73502 X-RAY EXAM HIP UNI 2-3 VIEWS: CPT

## 2023-06-16 PROCEDURE — 99204 OFFICE O/P NEW MOD 45 MIN: CPT | Performed by: ORTHOPAEDIC SURGERY
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
## 2023-06-27 ENCOUNTER — PATIENT OUTREACH (OUTPATIENT)
Dept: OBGYN CLINIC | Facility: HOSPITAL | Age: 48
End: 2023-06-27

## 2023-06-27 NOTE — PROGRESS NOTES
REBECCA received a new referral on 06/16/2023 in regard to pt hat needs assistance with post operative planning  Pt has a tentative date in September 2023 for R BRIDGER   It s also noted pt does require a clearance letter from her therapist and/or psychiatrist prior to surgery due to recent accidental drug overdose  I contacted pt today and introduced self and role  Per pt she was unable to talk because she is at an appt  Pt requested I call her at a later time  REBECCA will make call at a later date

## 2023-06-28 ENCOUNTER — HOSPITAL ENCOUNTER (EMERGENCY)
Facility: HOSPITAL | Age: 48
Discharge: HOME | End: 2023-06-29
Attending: EMERGENCY MEDICINE
Payer: MEDICARE

## 2023-06-28 DIAGNOSIS — Z00.8 ENCOUNTER FOR PSYCHOLOGICAL EVALUATION: Primary | ICD-10-CM

## 2023-06-28 DIAGNOSIS — F32.A DEPRESSION: ICD-10-CM

## 2023-06-28 LAB
ALBUMIN SERPL BCP-MCNC: 4.6 G/DL (ref 3.5–5)
ALP SERPL-CCNC: 58 U/L (ref 34–104)
ALT SERPL W P-5'-P-CCNC: 17 U/L (ref 7–52)
AMPHETAMINES SERPL QL SCN: POSITIVE
ANION GAP SERPL CALCULATED.3IONS-SCNC: 6 MMOL/L
AST SERPL W P-5'-P-CCNC: 20 U/L (ref 13–39)
ATRIAL RATE: 113 BPM
BACTERIA UR QL AUTO: ABNORMAL /HPF
BARBITURATES UR QL: NEGATIVE
BASOPHILS # BLD AUTO: 0.05 THOUSANDS/ÂΜL (ref 0–0.1)
BASOPHILS NFR BLD AUTO: 1 % (ref 0–1)
BENZODIAZ UR QL: POSITIVE
BILIRUB SERPL-MCNC: 0.24 MG/DL (ref 0.2–1)
BILIRUB UR QL STRIP: NEGATIVE
BUN SERPL-MCNC: 14 MG/DL (ref 5–25)
CALCIUM SERPL-MCNC: 9.3 MG/DL (ref 8.4–10.2)
CHLORIDE SERPL-SCNC: 107 MMOL/L (ref 96–108)
CLARITY UR: CLEAR
CO2 SERPL-SCNC: 23 MMOL/L (ref 21–32)
COCAINE UR QL: NEGATIVE
COLOR UR: YELLOW
CREAT SERPL-MCNC: 0.63 MG/DL (ref 0.6–1.3)
EOSINOPHIL # BLD AUTO: 0.03 THOUSAND/ÂΜL (ref 0–0.61)
EOSINOPHIL NFR BLD AUTO: 0 % (ref 0–6)
ERYTHROCYTE [DISTWIDTH] IN BLOOD BY AUTOMATED COUNT: 11.3 % (ref 11.6–15.1)
ETHANOL EXG-MCNC: 0.07 MG/DL
EXT PREGNANCY TEST URINE: NEGATIVE
EXT. CONTROL: NORMAL
GFR SERPL CREATININE-BSD FRML MDRD: 105 ML/MIN/1.73SQ M
GLUCOSE SERPL-MCNC: 97 MG/DL (ref 65–140)
GLUCOSE UR STRIP-MCNC: NEGATIVE MG/DL
HCT VFR BLD AUTO: 45 % (ref 34.8–46.1)
HGB BLD-MCNC: 14.4 G/DL (ref 11.5–15.4)
HGB UR QL STRIP.AUTO: ABNORMAL
IMM GRANULOCYTES # BLD AUTO: 0.03 THOUSAND/UL (ref 0–0.2)
IMM GRANULOCYTES NFR BLD AUTO: 0 % (ref 0–2)
KETONES UR STRIP-MCNC: NEGATIVE MG/DL
LEUKOCYTE ESTERASE UR QL STRIP: ABNORMAL
LYMPHOCYTES # BLD AUTO: 2.11 THOUSANDS/ÂΜL (ref 0.6–4.47)
LYMPHOCYTES NFR BLD AUTO: 20 % (ref 14–44)
MCH RBC QN AUTO: 31.8 PG (ref 26.8–34.3)
MCHC RBC AUTO-ENTMCNC: 32 G/DL (ref 31.4–37.4)
MCV RBC AUTO: 99 FL (ref 82–98)
METHADONE UR QL: NEGATIVE
MONOCYTES # BLD AUTO: 0.62 THOUSAND/ÂΜL (ref 0.17–1.22)
MONOCYTES NFR BLD AUTO: 6 % (ref 4–12)
MUCOUS THREADS UR QL AUTO: ABNORMAL
NEUTROPHILS # BLD AUTO: 7.72 THOUSANDS/ÂΜL (ref 1.85–7.62)
NEUTS SEG NFR BLD AUTO: 73 % (ref 43–75)
NITRITE UR QL STRIP: NEGATIVE
NON-SQ EPI CELLS URNS QL MICRO: ABNORMAL /HPF
NRBC BLD AUTO-RTO: 0 /100 WBCS
OPIATES UR QL SCN: POSITIVE
OXYCODONE+OXYMORPHONE UR QL SCN: NEGATIVE
P AXIS: 48 DEGREES
PCP UR QL: NEGATIVE
PH UR STRIP.AUTO: 5 [PH] (ref 4.5–8)
PLATELET # BLD AUTO: 321 THOUSANDS/UL (ref 149–390)
PMV BLD AUTO: 8.4 FL (ref 8.9–12.7)
POTASSIUM SERPL-SCNC: 3.8 MMOL/L (ref 3.5–5.3)
PR INTERVAL: 152 MS
PROT SERPL-MCNC: 7.5 G/DL (ref 6.4–8.4)
PROT UR STRIP-MCNC: NEGATIVE MG/DL
QRS AXIS: -13 DEGREES
QRSD INTERVAL: 76 MS
QT INTERVAL: 324 MS
QTC INTERVAL: 444 MS
RBC # BLD AUTO: 4.53 MILLION/UL (ref 3.81–5.12)
RBC #/AREA URNS AUTO: ABNORMAL /HPF
SODIUM SERPL-SCNC: 136 MMOL/L (ref 135–147)
SP GR UR STRIP.AUTO: 1.01 (ref 1–1.03)
T WAVE AXIS: 68 DEGREES
THC UR QL: POSITIVE
TSH SERPL DL<=0.05 MIU/L-ACNC: 1.37 UIU/ML (ref 0.45–4.5)
UROBILINOGEN UR QL STRIP.AUTO: 0.2 E.U./DL
VENTRICULAR RATE: 113 BPM
WBC # BLD AUTO: 10.56 THOUSAND/UL (ref 4.31–10.16)
WBC #/AREA URNS AUTO: ABNORMAL /HPF

## 2023-06-28 PROCEDURE — 81025 URINE PREGNANCY TEST: CPT | Performed by: EMERGENCY MEDICINE

## 2023-06-28 PROCEDURE — 84443 ASSAY THYROID STIM HORMONE: CPT | Performed by: EMERGENCY MEDICINE

## 2023-06-28 PROCEDURE — 93005 ELECTROCARDIOGRAM TRACING: CPT

## 2023-06-28 PROCEDURE — 80307 DRUG TEST PRSMV CHEM ANLYZR: CPT | Performed by: EMERGENCY MEDICINE

## 2023-06-28 PROCEDURE — 85025 COMPLETE CBC W/AUTO DIFF WBC: CPT | Performed by: EMERGENCY MEDICINE

## 2023-06-28 PROCEDURE — 99244 OFF/OP CNSLTJ NEW/EST MOD 40: CPT | Performed by: PSYCHIATRY & NEUROLOGY

## 2023-06-28 PROCEDURE — 81001 URINALYSIS AUTO W/SCOPE: CPT

## 2023-06-28 PROCEDURE — 99284 EMERGENCY DEPT VISIT MOD MDM: CPT

## 2023-06-28 PROCEDURE — 80053 COMPREHEN METABOLIC PANEL: CPT | Performed by: EMERGENCY MEDICINE

## 2023-06-28 PROCEDURE — 36415 COLL VENOUS BLD VENIPUNCTURE: CPT | Performed by: EMERGENCY MEDICINE

## 2023-06-28 PROCEDURE — 82075 ASSAY OF BREATH ETHANOL: CPT | Performed by: EMERGENCY MEDICINE

## 2023-06-28 PROCEDURE — 99285 EMERGENCY DEPT VISIT HI MDM: CPT | Performed by: EMERGENCY MEDICINE

## 2023-06-28 PROCEDURE — 93010 ELECTROCARDIOGRAM REPORT: CPT | Performed by: INTERNAL MEDICINE

## 2023-06-28 PROCEDURE — 96372 THER/PROPH/DIAG INJ SC/IM: CPT

## 2023-06-28 RX ORDER — DIAZEPAM 5 MG/1
10 TABLET ORAL EVERY 12 HOURS PRN
Status: DISCONTINUED | OUTPATIENT
Start: 2023-06-28 | End: 2023-06-29 | Stop reason: HOSPADM

## 2023-06-28 RX ORDER — TIZANIDINE 4 MG/1
4 TABLET ORAL EVERY 8 HOURS PRN
Status: DISCONTINUED | OUTPATIENT
Start: 2023-06-28 | End: 2023-06-29 | Stop reason: HOSPADM

## 2023-06-28 RX ORDER — POTASSIUM CHLORIDE 750 MG/1
10 TABLET, EXTENDED RELEASE ORAL DAILY
Status: DISCONTINUED | OUTPATIENT
Start: 2023-06-29 | End: 2023-06-29 | Stop reason: HOSPADM

## 2023-06-28 RX ORDER — LIOTHYRONINE SODIUM 5 UG/1
10 TABLET ORAL DAILY
Status: DISCONTINUED | OUTPATIENT
Start: 2023-06-29 | End: 2023-06-29 | Stop reason: HOSPADM

## 2023-06-28 RX ORDER — HALOPERIDOL 5 MG/1
5 TABLET ORAL EVERY 6 HOURS PRN
Status: DISCONTINUED | OUTPATIENT
Start: 2023-06-28 | End: 2023-06-29 | Stop reason: HOSPADM

## 2023-06-28 RX ORDER — LANOLIN ALCOHOL/MO/W.PET/CERES
3 CREAM (GRAM) TOPICAL
Status: DISCONTINUED | OUTPATIENT
Start: 2023-06-28 | End: 2023-06-29 | Stop reason: HOSPADM

## 2023-06-28 RX ORDER — GABAPENTIN 300 MG/1
300 CAPSULE ORAL 2 TIMES DAILY
Status: DISCONTINUED | OUTPATIENT
Start: 2023-06-28 | End: 2023-06-29 | Stop reason: HOSPADM

## 2023-06-28 RX ORDER — DOXEPIN HYDROCHLORIDE 50 MG/1
100 CAPSULE ORAL
Status: DISCONTINUED | OUTPATIENT
Start: 2023-06-28 | End: 2023-06-29 | Stop reason: HOSPADM

## 2023-06-28 RX ORDER — HALOPERIDOL 5 MG/ML
5 INJECTION INTRAMUSCULAR EVERY 6 HOURS PRN
Status: DISCONTINUED | OUTPATIENT
Start: 2023-06-28 | End: 2023-06-29 | Stop reason: HOSPADM

## 2023-06-28 RX ORDER — ACETAMINOPHEN 325 MG/1
650 TABLET ORAL EVERY 8 HOURS PRN
Status: DISCONTINUED | OUTPATIENT
Start: 2023-06-28 | End: 2023-06-29 | Stop reason: HOSPADM

## 2023-06-28 RX ORDER — CELECOXIB 200 MG/1
200 CAPSULE ORAL 2 TIMES DAILY
Status: DISCONTINUED | OUTPATIENT
Start: 2023-06-28 | End: 2023-06-29 | Stop reason: HOSPADM

## 2023-06-28 RX ORDER — PRAZOSIN HYDROCHLORIDE 2 MG/1
10 CAPSULE ORAL
Status: DISCONTINUED | OUTPATIENT
Start: 2023-06-28 | End: 2023-06-29 | Stop reason: HOSPADM

## 2023-06-28 RX ORDER — AMLODIPINE BESYLATE 5 MG/1
5 TABLET ORAL DAILY
Status: DISCONTINUED | OUTPATIENT
Start: 2023-06-29 | End: 2023-06-29 | Stop reason: HOSPADM

## 2023-06-28 RX ORDER — DEXTROAMPHETAMINE SACCHARATE, AMPHETAMINE ASPARTATE, DEXTROAMPHETAMINE SULFATE AND AMPHETAMINE SULFATE 2.5; 2.5; 2.5; 2.5 MG/1; MG/1; MG/1; MG/1
20 TABLET ORAL
Status: DISCONTINUED | OUTPATIENT
Start: 2023-06-28 | End: 2023-06-29 | Stop reason: HOSPADM

## 2023-06-28 RX ORDER — LIDOCAINE 50 MG/G
1 PATCH TOPICAL DAILY PRN
Status: DISCONTINUED | OUTPATIENT
Start: 2023-06-28 | End: 2023-06-29 | Stop reason: HOSPADM

## 2023-06-28 RX ORDER — ALBUTEROL SULFATE 90 UG/1
2 AEROSOL, METERED RESPIRATORY (INHALATION) EVERY 4 HOURS PRN
Status: DISCONTINUED | OUTPATIENT
Start: 2023-06-28 | End: 2023-06-29 | Stop reason: HOSPADM

## 2023-06-28 RX ORDER — LORAZEPAM 2 MG/ML
2 INJECTION INTRAMUSCULAR EVERY 6 HOURS PRN
Status: DISCONTINUED | OUTPATIENT
Start: 2023-06-28 | End: 2023-06-29 | Stop reason: HOSPADM

## 2023-06-28 RX ORDER — FLUPHENAZINE HYDROCHLORIDE 2.5 MG/1
5 TABLET ORAL
Status: DISCONTINUED | OUTPATIENT
Start: 2023-06-28 | End: 2023-06-29 | Stop reason: HOSPADM

## 2023-06-28 RX ORDER — LORAZEPAM 1 MG/1
2 TABLET ORAL EVERY 6 HOURS PRN
Status: DISCONTINUED | OUTPATIENT
Start: 2023-06-28 | End: 2023-06-29

## 2023-06-28 RX ORDER — HYDROCODONE BITARTRATE AND ACETAMINOPHEN 5; 325 MG/1; MG/1
1 TABLET ORAL EVERY 6 HOURS PRN
Status: DISCONTINUED | OUTPATIENT
Start: 2023-06-28 | End: 2023-06-29

## 2023-06-28 RX ORDER — MONTELUKAST SODIUM 10 MG/1
10 TABLET ORAL
Status: DISCONTINUED | OUTPATIENT
Start: 2023-06-28 | End: 2023-06-29 | Stop reason: HOSPADM

## 2023-06-28 RX ORDER — PRAVASTATIN SODIUM 40 MG
40 TABLET ORAL
Status: DISCONTINUED | OUTPATIENT
Start: 2023-06-28 | End: 2023-06-29 | Stop reason: HOSPADM

## 2023-06-28 RX ORDER — LORAZEPAM 1 MG/1
2 TABLET ORAL EVERY 6 HOURS PRN
Status: DISCONTINUED | OUTPATIENT
Start: 2023-06-28 | End: 2023-06-29 | Stop reason: HOSPADM

## 2023-06-28 RX ORDER — LORATADINE 10 MG/1
10 TABLET ORAL DAILY
Status: DISCONTINUED | OUTPATIENT
Start: 2023-06-29 | End: 2023-06-29 | Stop reason: HOSPADM

## 2023-06-28 RX ADMIN — Medication 3 MG: at 22:22

## 2023-06-28 RX ADMIN — PRAZOSIN HYDROCHLORIDE 10 MG: 2 CAPSULE ORAL at 21:41

## 2023-06-28 RX ADMIN — DOXEPIN HYDROCHLORIDE 100 MG: 50 CAPSULE ORAL at 21:35

## 2023-06-28 RX ADMIN — HALOPERIDOL LACTATE 5 MG: 5 INJECTION, SOLUTION INTRAMUSCULAR at 21:34

## 2023-06-28 RX ADMIN — MONTELUKAST 10 MG: 10 TABLET, FILM COATED ORAL at 21:34

## 2023-06-28 RX ADMIN — FLUPHENAZINE HYDROCHLORIDE 5 MG: 2.5 TABLET, FILM COATED ORAL at 21:34

## 2023-06-28 RX ADMIN — DEXTROAMPHETAMINE SACCHARATE, AMPHETAMINE ASPARTATE, DEXTROAMPHETAMINE SULFATE AND AMPHETAMINE SULFATE 20 MG: 2.5; 2.5; 2.5; 2.5 TABLET ORAL at 18:01

## 2023-06-28 RX ADMIN — PRAVASTATIN SODIUM 40 MG: 40 TABLET ORAL at 18:01

## 2023-06-28 RX ADMIN — GABAPENTIN 300 MG: 300 CAPSULE ORAL at 18:01

## 2023-06-28 RX ADMIN — CELECOXIB 200 MG: 200 CAPSULE ORAL at 18:01

## 2023-06-28 RX ADMIN — LORAZEPAM 2 MG: 1 TABLET ORAL at 22:22
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
## 2023-06-28 NOTE — ED NOTES
"CIS met with  Pt and Pt asked for Nigel (pt's [facility]) as well  Pt continually said \"I didn't do anything\"  I never said I would kill myself  Pt stated that she always has suicidal thoughts her entire life and she has just learned to cope  Pt stated that they never go away and inpatient won't help that  Pt denies suicidal intent or plan  Pt stated that when she spoke to Nigel in her home, she stated that when she self harms she doesn't know when to stop but pt again said but \"I never said I was going to harm myself  \"  During interview, pt's alter personality was Pane and pt would respond to TC  Pt continued to repeat, \"I wasn't doing anything and the police barged in my home and put me in handcuffs\"  Pt then began crying and saying to Nigel, \"I only wanted to talk to you and we could have worked it out  \"  I always work it out with my therapist and I go home and eat dinner and watch tv  Pt was fixated on police handcuffing her and bringing her to the hospital and it was difficult to get pt to focus on other questions  Pt is aware that she has multiple personalities  Personalities do switch seamlessly when speaking to pt  Pt's affect was flat and tearful  Mood depressed, anxious, irritable  Pt's judgement is poor, insight poor, impulse control poor, appetite good, sleep good  Pt is compliant with meds  Pt denies HI  CIS informed pt of 302 and pt again repeated, I didn't do anything to be 302'd  \"I never said I would kill myself  \"  She again began crying and repeating the events that occurred that brought her to ED  CIS attempted to redirect  CIS offered 201, but pt stated again she doesn't belong here  Pt states inpatient doesn't help  302 rights were explained and given and pt continued to repeat \"I didn't do anything and requested to talk to [facility] about the incident that happened today  Nigel also stated that pt's meds are working, but there are other environmental and social issues that affect pt    Pt " has two service dogs and does well caring for them  Pt is extremely fearful that her dogs are being placed in a shelter because the last time pt went inpatient, pt's dogs got sick and she was responsible to pay for them to get better  Pt does not want to go inpatient but specifically does not want [facility] due to a bad experience there
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
## 2023-06-29 VITALS
OXYGEN SATURATION: 97 % | TEMPERATURE: 98.2 F | DIASTOLIC BLOOD PRESSURE: 93 MMHG | RESPIRATION RATE: 16 BRPM | SYSTOLIC BLOOD PRESSURE: 198 MMHG | HEART RATE: 85 BPM

## 2023-06-29 RX ORDER — HYDROCODONE BITARTRATE AND ACETAMINOPHEN 5; 325 MG/1; MG/1
1 TABLET ORAL EVERY 6 HOURS PRN
Status: DISCONTINUED | OUTPATIENT
Start: 2023-06-29 | End: 2023-06-29 | Stop reason: HOSPADM

## 2023-06-29 RX ORDER — HYDROCODONE BITARTRATE AND ACETAMINOPHEN 5; 325 MG/1; MG/1
1 TABLET ORAL EVERY 6 HOURS PRN
COMMUNITY

## 2023-06-29 RX ORDER — ESTRADIOL 1 MG/1
0.5 TABLET ORAL 2 TIMES DAILY
Status: DISCONTINUED | OUTPATIENT
Start: 2023-06-29 | End: 2023-06-29 | Stop reason: HOSPADM

## 2023-06-29 RX ADMIN — DEXTROAMPHETAMINE SACCHARATE, AMPHETAMINE ASPARTATE, DEXTROAMPHETAMINE SULFATE AND AMPHETAMINE SULFATE 20 MG: 2.5; 2.5; 2.5; 2.5 TABLET ORAL at 08:01

## 2023-06-29 RX ADMIN — LORATADINE 10 MG: 10 TABLET ORAL at 08:01

## 2023-06-29 RX ADMIN — ESTRADIOL 0.5 MG: 1 TABLET ORAL at 08:09

## 2023-06-29 RX ADMIN — ESTRADIOL 0.5 MG: 1 TABLET ORAL at 01:14

## 2023-06-29 RX ADMIN — AMLODIPINE BESYLATE 5 MG: 5 TABLET ORAL at 08:01

## 2023-06-29 RX ADMIN — PRAZOSIN HYDROCHLORIDE 5 MG: 2 CAPSULE ORAL at 08:01

## 2023-06-29 RX ADMIN — LIOTHYRONINE SODIUM 10 MCG: 5 TABLET ORAL at 08:08

## 2023-06-29 RX ADMIN — GABAPENTIN 300 MG: 300 CAPSULE ORAL at 08:01

## 2023-06-29 RX ADMIN — HYDROCODONE BITARTRATE AND ACETAMINOPHEN 1 TABLET: 5; 325 TABLET ORAL at 07:57

## 2023-06-29 RX ADMIN — CELECOXIB 200 MG: 200 CAPSULE ORAL at 08:02

## 2023-06-29 RX ADMIN — DEXTROAMPHETAMINE SACCHARATE, AMPHETAMINE ASPARTATE, DEXTROAMPHETAMINE SULFATE AND AMPHETAMINE SULFATE 20 MG: 2.5; 2.5; 2.5; 2.5 TABLET ORAL at 13:53

## 2023-06-29 RX ADMIN — POTASSIUM CHLORIDE 10 MEQ: 750 TABLET, EXTENDED RELEASE ORAL at 08:01

## 2023-06-29 NOTE — ED NOTES
Patient reporting 'hot flashes', reports taking Estriadol BID, requested from provider     Nazia Mccollum RN  06/29/23 3259

## 2023-06-29 NOTE — ED NOTES
302 and facesheet sent to intake    Per [facility], Pt's case will need treatment team review in the am

## 2023-06-29 NOTE — ED NOTES
Pt to be discharged home - waiting on ICM to come get her for ride home       Drake Cisneros, RN  06/29/23 8486

## 2023-06-29 NOTE — ED NOTES
Ate 70% breakfast  Dumped Iced coffee by accident  Linens bridgette Cisneros, RN  06/29/23 [address] [facility]  06/29/23 9782

## 2023-06-29 NOTE — ED NOTES
[address] emailed to [facility] AT [facility] [place]  06/28/23   0731

## 2023-06-29 NOTE — DISCHARGE INSTRUCTIONS
Please follow up with your therapist and psychiatrist and discussed with our psychiatrist Dr Bianca Jasmine  This writer discussed the patients current presentation and recommended discharge plan with Dr Bianca Jasmine    They agree with the patient being discharged at this time with referrals and/or information about:  Outpatient referrals     The patient was Instructed to follow up with: [facility] and outpatient treatment    Patient was discharged by psychiatry and will follow with her outpatient providers  She will return to the emergency room if symptoms worsen                             National Suicide Prevention Hotline:  9-939.576.1585     [facility] [address] [place] [address] [phone] - LVF Crisis/Mobile Crisis   [address]: [phone]  [facility] [address] [phone] - Crisis   [phone] - Peer Support Talk Line (1-9pm daily)  [phone] - Teen Support Talk Line (1-9pm daily)  [address] [facility] [phone] - [address] [facility] [address]
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
## 2023-06-29 NOTE — ED NOTES
Patient evaluated by Dr Kim-psychiatry  Per Dr Massimo Fernández patient appears to be at baseline and will be discharged home  NSMD completed and [facility] notified

## 2023-06-29 NOTE — ED NOTES
Vitals signs are deferred at this time as the patient is sleeping  Respirations are equal + non labored & no outward signs of distress noted   Visual continual observation will continue     Edward Smart  06/29/23 1177

## 2023-06-29 NOTE — ED CARE HANDOFF
Emergency Department Sign Out Note        Sign out and transfer of care from Dr Delmi Rodriguez  See Separate Emergency Department note  The patient, Earnest Hampton, was evaluated by the previous provider for DID/depression  Workup Completed:  302    ED Course / Workup Pending (followup):  Placement                                  ED Course as of 06/29/23 1212   u Jun 29, 2023   5179 Received pt in sign out from Dr Delmi Rodriguez  Pt 36'd yesterday  No overnight events  A/w bed placement  0805 Psych consult placed for 303 hearing  303 papers signed  1120 Pt evaluated by Dr Telma Beal (psych) along with pt's intensive   Pt's  does not feel pt needs hospitalization  Per Dr Telma Beal, pt is at baseline and states inpatient psych tx is not indicated and would likely cause more harm  He recommends outpt psych f/u with her , therapist, and psychiatrist   He states pt can resume her home psych meds  He states pt does not need suicide precautions  Procedures  MDM        Disposition  Final diagnoses:   Encounter for psychological evaluation   Depression     Time reflects when diagnosis was documented in both MDM as applicable and the Disposition within this note     Time User Action Codes Description Comment    6/28/2023  6:39 PM Lucy Clubs Add [Z00 8] Encounter for psychological evaluation     6/28/2023  6:39 PM Lucy Clubs Add Gönül Rank  A] Depression       ED Disposition     ED Disposition   Discharge    Condition   Stable    Date/Time   Thu Jun 29, 2023 11:24 AM    Comment   Ilia Kearns should be transferred out to D and has been medically cleared  MD Mansoor Godfrey Most Recent Value   Sending MD Dr Roselind Babinski    None       Patient's Medications   Discharge Prescriptions    No medications on file     No discharge procedures on file         ED Provider  Electronically Signed by     [address],   06/29/23 An Jamie Schütt 52

## 2023-06-29 NOTE — ED NOTES
Insurance Authorization for admission:   Phone call placed to [facility] number: [phone]    Spoke to [place]     4 days approved  Level of care: acute inpatient mental health  Review on **  Authorization # provided to accepting facility upon admission notification  EVS (Eligibility Verification System) called - [phone]  Automated system indicates: active MA [address] for Transportation:  TBD  Phone call placed to **  Phone number **  Spoke to **     Authorization #: **

## 2023-07-13 ENCOUNTER — HOSPITAL ENCOUNTER (EMERGENCY)
Facility: HOSPITAL | Age: 48
Discharge: HOME | End: 2023-07-13
Attending: EMERGENCY MEDICINE | Admitting: EMERGENCY MEDICINE
Payer: MEDICARE

## 2023-07-13 VITALS
DIASTOLIC BLOOD PRESSURE: 86 MMHG | WEIGHT: 156.97 LBS | SYSTOLIC BLOOD PRESSURE: 144 MMHG | TEMPERATURE: 97.7 F | OXYGEN SATURATION: 97 % | HEART RATE: 75 BPM | RESPIRATION RATE: 16 BRPM | BODY MASS INDEX: 31.7 KG/M2

## 2023-07-13 DIAGNOSIS — F19.10 SUBSTANCE ABUSE (HCC): Primary | ICD-10-CM

## 2023-07-13 DIAGNOSIS — T50.901A ACCIDENTAL DRUG OVERDOSE, INITIAL ENCOUNTER: ICD-10-CM

## 2023-07-13 PROCEDURE — 99284 EMERGENCY DEPT VISIT MOD MDM: CPT | Performed by: EMERGENCY MEDICINE

## 2023-07-13 PROCEDURE — 99284 EMERGENCY DEPT VISIT MOD MDM: CPT
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
## 2023-07-13 NOTE — ED NOTES
Pt more alert & ate a sandwich & drank soda     Patricio Garcia, [address]  07/13/23 8805

## 2023-07-28 ENCOUNTER — OFFICE VISIT (OUTPATIENT)
Dept: OBGYN CLINIC | Facility: MEDICAL CENTER | Age: 48
End: 2023-07-28
Payer: MEDICARE

## 2023-07-28 VITALS
HEART RATE: 82 BPM | BODY MASS INDEX: 32.05 KG/M2 | HEIGHT: 59 IN | DIASTOLIC BLOOD PRESSURE: 72 MMHG | SYSTOLIC BLOOD PRESSURE: 114 MMHG | WEIGHT: 159 LBS

## 2023-07-28 DIAGNOSIS — M16.11 PRIMARY OSTEOARTHRITIS OF ONE HIP, RIGHT: Primary | ICD-10-CM

## 2023-07-28 DIAGNOSIS — M17.11 PRIMARY OSTEOARTHRITIS OF RIGHT KNEE: ICD-10-CM

## 2023-07-28 PROCEDURE — 99214 OFFICE O/P EST MOD 30 MIN: CPT | Performed by: ORTHOPAEDIC SURGERY

## 2023-07-28 NOTE — PATIENT INSTRUCTIONS
We will plan to get several clearances prior to your total hip replacement. We will have you meet with Dr. Gera Guardado who is our internal medicine doctor. He will provide recommendations if he feels you are medically stable to go forward with surgery. We will also get clearance from your cardiology. Please contact them for evaluation. We already got clearance from your therapist, Caron Champagne. We placed an order for a right hip injection which you can have after September 14, 2023. Please schedule this so that you have it booked. We will evaluate prior to determine if you should get the injection or not. We will cancel the injection if we are able to do surgery for you in November. You must wait 3 months between injections and surgery.
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
## 2023-08-10 ENCOUNTER — PATIENT OUTREACH (OUTPATIENT)
Dept: OBGYN CLINIC | Facility: HOSPITAL | Age: 48
End: 2023-08-10
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
## 2023-08-29 ENCOUNTER — PATIENT OUTREACH (OUTPATIENT)
Dept: OBGYN CLINIC | Facility: HOSPITAL | Age: 48
End: 2023-08-29

## 2023-08-29 NOTE — PROGRESS NOTES
MELINDA made second attempt to reach pt. I was able to reach pt today and introduced myself and role. Pt shared today that [place] receives [place] [id number] [facility] services through [facility] in [place]. Per pt her therapist did write a clearance paper for surgery. Per pt she handed it to Dr. Loreto Smalls on 07/28/2023 at the office visit. Per pt she continues to receive OP  services and will for the ongoing future. Pt also shared that she went to the cardiologist and had the MRI of heart today and has her stress test scheduled for 09/05/2023. [facility] will message Dr. Loreto Smalls to inquire if there are any additional concerns that need to be addressed. [facility] will remain available.

## 2023-09-06 ENCOUNTER — OFFICE VISIT (OUTPATIENT)
Age: 48
End: 2023-09-06
Payer: MEDICARE

## 2023-09-06 VITALS
HEIGHT: 59 IN | SYSTOLIC BLOOD PRESSURE: 140 MMHG | DIASTOLIC BLOOD PRESSURE: 80 MMHG | WEIGHT: 159 LBS | BODY MASS INDEX: 32.05 KG/M2

## 2023-09-06 DIAGNOSIS — Z96.652 S/P TOTAL KNEE ARTHROPLASTY, LEFT: ICD-10-CM

## 2023-09-06 DIAGNOSIS — M16.11 PRIMARY OSTEOARTHRITIS OF RIGHT HIP: ICD-10-CM

## 2023-09-06 DIAGNOSIS — F31.64 BIPOLAR I DISORDER, MOST RECENT EPISODE MIXED, SEVERE WITH PSYCHOTIC FEATURES (HCC): Primary | Chronic | ICD-10-CM

## 2023-09-06 DIAGNOSIS — G89.29 CHRONIC BILATERAL LOW BACK PAIN WITHOUT SCIATICA: ICD-10-CM

## 2023-09-06 DIAGNOSIS — Z96.649 S/P REVISION OF TOTAL HIP: ICD-10-CM

## 2023-09-06 DIAGNOSIS — M54.50 CHRONIC BILATERAL LOW BACK PAIN WITHOUT SCIATICA: ICD-10-CM

## 2023-09-06 DIAGNOSIS — F44.81 DISSOCIATIVE IDENTITY DISORDER (HCC): Chronic | ICD-10-CM

## 2023-09-06 DIAGNOSIS — Z01.818 PREOP EXAM FOR INTERNAL MEDICINE: ICD-10-CM

## 2023-09-06 PROCEDURE — 99204 OFFICE O/P NEW MOD 45 MIN: CPT | Performed by: INTERNAL MEDICINE

## 2023-09-06 RX ORDER — SIMVASTATIN 20 MG
TABLET ORAL
COMMUNITY
Start: 2023-07-11

## 2023-09-06 RX ORDER — FLUPHENAZINE HYDROCHLORIDE 5 MG/1
TABLET ORAL
COMMUNITY
Start: 2023-08-22

## 2023-09-06 RX ORDER — BENZTROPINE MESYLATE 1 MG/1
TABLET ORAL
COMMUNITY
Start: 2023-07-22

## 2023-09-06 RX ORDER — DOXEPIN HYDROCHLORIDE 100 MG/1
CAPSULE ORAL
COMMUNITY
Start: 2023-08-22

## 2023-09-06 RX ORDER — HALOPERIDOL 10 MG/1
TABLET ORAL
COMMUNITY
Start: 2023-08-21

## 2023-09-06 RX ORDER — ONDANSETRON 4 MG/1
TABLET, ORALLY DISINTEGRATING ORAL
COMMUNITY
Start: 2023-08-28

## 2023-09-06 RX ORDER — GUAIFENESIN 600 MG/1
TABLET, EXTENDED RELEASE ORAL
COMMUNITY
Start: 2023-03-17

## 2023-09-06 RX ORDER — AMLODIPINE BESYLATE 5 MG/1
5 TABLET ORAL
COMMUNITY
Start: 2023-03-24 | End: 2024-03-23

## 2023-09-06 RX ORDER — DOCUSATE SODIUM 50 MG AND SENNOSIDES 8.6 MG 8.6; 5 MG/1; MG/1
TABLET, FILM COATED ORAL
COMMUNITY
Start: 2023-08-28

## 2023-09-06 RX ORDER — DEXTROAMPHETAMINE SACCHARATE, AMPHETAMINE ASPARTATE, DEXTROAMPHETAMINE SULFATE AND AMPHETAMINE SULFATE 5; 5; 5; 5 MG/1; MG/1; MG/1; MG/1
20 TABLET ORAL
COMMUNITY

## 2023-09-06 RX ORDER — LIDOCAINE 50 MG/G
PATCH TOPICAL
COMMUNITY
Start: 2023-08-18

## 2023-09-06 RX ORDER — HALOPERIDOL 5 MG/1
TABLET ORAL
COMMUNITY
Start: 2023-07-08

## 2023-09-06 RX ORDER — VORTIOXETINE 10 MG/1
TABLET, FILM COATED ORAL
COMMUNITY
Start: 2023-08-14

## 2023-09-06 NOTE — PATIENT INSTRUCTIONS
Contact surgical nurse  navigator with any questions regarding preoperative plan or schedule.   Stop all over the counter supplements, herbal, naturopathic  medications for 1 week prior to surgery UNLESS prescribed by your surgeon  Hold NSAIDS (i.e. advil, alleve, motrin, ibuprofen, celebrex) minimum 7 days prior to surgery  Hold Asprin minimum 7 days prior to surgery  Recommend using Tylenol ( acetaminophen ) 500mg every eight hours during the first week post discharge in conjunction with any additional pain medicine prescribed by your surgeon  Use bowel medicines prescribed by your surgeon ( colace) daily post op during the first 1-2 weeks to avoid post operative constipation issues  Call [phone] with any post discharge concerns or medical issues  The morning of surgery take only the following medication with small sip of water: amlodipine
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
## 2023-10-24 ENCOUNTER — TELEPHONE (OUTPATIENT)
Age: 48
End: 2023-10-24

## 2023-10-24 NOTE — TELEPHONE ENCOUNTER
Caller: [facility]    Doctor: Corina Blair    Reason for call: They received forms for cardiac clearance for surgery. They need clarification on what type of anesthesia will be used but there is not surgery scheduled yet.  Can someone call them back to clarify    Call back#: [phone]

## 2023-10-27 ENCOUNTER — APPOINTMENT (OUTPATIENT)
Dept: RADIOLOGY | Facility: MEDICAL CENTER | Age: 48
End: 2023-10-27
Payer: MEDICARE

## 2023-10-27 ENCOUNTER — OFFICE VISIT (OUTPATIENT)
Dept: OBGYN CLINIC | Facility: MEDICAL CENTER | Age: 48
End: 2023-10-27
Payer: MEDICARE

## 2023-10-27 VITALS
BODY MASS INDEX: 32.66 KG/M2 | WEIGHT: 162 LBS | DIASTOLIC BLOOD PRESSURE: 72 MMHG | SYSTOLIC BLOOD PRESSURE: 132 MMHG | HEIGHT: 59 IN | HEART RATE: 82 BPM

## 2023-10-27 DIAGNOSIS — Z01.818 PREOPERATIVE TESTING: ICD-10-CM

## 2023-10-27 DIAGNOSIS — M16.11 PRIMARY OSTEOARTHRITIS OF ONE HIP, RIGHT: Primary | ICD-10-CM

## 2023-10-27 DIAGNOSIS — M16.11 PRIMARY OSTEOARTHRITIS OF ONE HIP, RIGHT: ICD-10-CM

## 2023-10-27 DIAGNOSIS — Z01.818 PRE-OP EXAMINATION: ICD-10-CM

## 2023-10-27 PROCEDURE — 99213 OFFICE O/P EST LOW 20 MIN: CPT | Performed by: ORTHOPAEDIC SURGERY

## 2023-10-27 PROCEDURE — 73502 X-RAY EXAM HIP UNI 2-3 VIEWS: CPT
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
## 2023-10-31 ENCOUNTER — APPOINTMENT (OUTPATIENT)
Dept: LAB | Facility: HOSPITAL | Age: 48
End: 2023-10-31
Payer: MEDICARE

## 2023-10-31 ENCOUNTER — TELEPHONE (OUTPATIENT)
Age: 48
End: 2023-10-31

## 2023-10-31 ENCOUNTER — LAB REQUISITION (OUTPATIENT)
Dept: LAB | Facility: HOSPITAL | Age: 48
End: 2023-10-31
Payer: MEDICARE

## 2023-10-31 DIAGNOSIS — M16.11 PRIMARY OSTEOARTHRITIS OF ONE HIP, RIGHT: ICD-10-CM

## 2023-10-31 DIAGNOSIS — Z01.811 PRE-OP CHEST EXAM: ICD-10-CM

## 2023-10-31 DIAGNOSIS — Z01.811 ENCOUNTER FOR PREPROCEDURAL RESPIRATORY EXAMINATION: ICD-10-CM

## 2023-10-31 DIAGNOSIS — Z01.818 PRE-OP EXAMINATION: ICD-10-CM

## 2023-10-31 LAB
ABO GROUP BLD: NORMAL
ALBUMIN SERPL BCP-MCNC: 4.1 G/DL (ref 3.5–5)
ALP SERPL-CCNC: 58 U/L (ref 34–104)
ALT SERPL W P-5'-P-CCNC: 17 U/L (ref 7–52)
ANION GAP SERPL CALCULATED.3IONS-SCNC: 4 MMOL/L
APTT PPP: 31 SECONDS (ref 23–37)
AST SERPL W P-5'-P-CCNC: 21 U/L (ref 13–39)
BASOPHILS # BLD AUTO: 0.05 THOUSANDS/ÂΜL (ref 0–0.1)
BASOPHILS NFR BLD AUTO: 1 % (ref 0–1)
BILIRUB SERPL-MCNC: 0.41 MG/DL (ref 0.2–1)
BLD GP AB SCN SERPL QL: NEGATIVE
BUN SERPL-MCNC: 17 MG/DL (ref 5–25)
CALCIUM SERPL-MCNC: 9.3 MG/DL (ref 8.4–10.2)
CHLORIDE SERPL-SCNC: 108 MMOL/L (ref 96–108)
CO2 SERPL-SCNC: 27 MMOL/L (ref 21–32)
CREAT SERPL-MCNC: 0.68 MG/DL (ref 0.6–1.3)
CRP SERPL QL: 5.6 MG/L
EOSINOPHIL # BLD AUTO: 0.19 THOUSAND/ÂΜL (ref 0–0.61)
EOSINOPHIL NFR BLD AUTO: 3 % (ref 0–6)
ERYTHROCYTE [DISTWIDTH] IN BLOOD BY AUTOMATED COUNT: 11.9 % (ref 11.6–15.1)
ERYTHROCYTE [SEDIMENTATION RATE] IN BLOOD: 18 MM/HOUR (ref 0–29)
EST. AVERAGE GLUCOSE BLD GHB EST-MCNC: 111 MG/DL
GFR SERPL CREATININE-BSD FRML MDRD: 102 ML/MIN/1.73SQ M
GLUCOSE P FAST SERPL-MCNC: 91 MG/DL (ref 65–99)
HBA1C MFR BLD: 5.5 %
HCT VFR BLD AUTO: 44 % (ref 34.8–46.1)
HGB BLD-MCNC: 15 G/DL (ref 11.5–15.4)
IMM GRANULOCYTES # BLD AUTO: 0.03 THOUSAND/UL (ref 0–0.2)
IMM GRANULOCYTES NFR BLD AUTO: 0 % (ref 0–2)
INR PPP: 0.97 (ref 0.84–1.19)
LYMPHOCYTES # BLD AUTO: 2.43 THOUSANDS/ÂΜL (ref 0.6–4.47)
LYMPHOCYTES NFR BLD AUTO: 36 % (ref 14–44)
MCH RBC QN AUTO: 32.5 PG (ref 26.8–34.3)
MCHC RBC AUTO-ENTMCNC: 34.1 G/DL (ref 31.4–37.4)
MCV RBC AUTO: 95 FL (ref 82–98)
MONOCYTES # BLD AUTO: 0.66 THOUSAND/ÂΜL (ref 0.17–1.22)
MONOCYTES NFR BLD AUTO: 10 % (ref 4–12)
NEUTROPHILS # BLD AUTO: 3.31 THOUSANDS/ÂΜL (ref 1.85–7.62)
NEUTS SEG NFR BLD AUTO: 50 % (ref 43–75)
NRBC BLD AUTO-RTO: 0 /100 WBCS
PLATELET # BLD AUTO: 321 THOUSANDS/UL (ref 149–390)
PMV BLD AUTO: 8.8 FL (ref 8.9–12.7)
POTASSIUM SERPL-SCNC: 4 MMOL/L (ref 3.5–5.3)
PROT SERPL-MCNC: 6.9 G/DL (ref 6.4–8.4)
PROTHROMBIN TIME: 12.9 SECONDS (ref 11.6–14.5)
RBC # BLD AUTO: 4.61 MILLION/UL (ref 3.81–5.12)
RH BLD: POSITIVE
SODIUM SERPL-SCNC: 139 MMOL/L (ref 135–147)
SPECIMEN EXPIRATION DATE: NORMAL
WBC # BLD AUTO: 6.67 THOUSAND/UL (ref 4.31–10.16)

## 2023-10-31 PROCEDURE — 85610 PROTHROMBIN TIME: CPT

## 2023-10-31 PROCEDURE — 85730 THROMBOPLASTIN TIME PARTIAL: CPT

## 2023-10-31 PROCEDURE — 86850 RBC ANTIBODY SCREEN: CPT | Performed by: ORTHOPAEDIC SURGERY

## 2023-10-31 PROCEDURE — 85652 RBC SED RATE AUTOMATED: CPT

## 2023-10-31 PROCEDURE — 36415 COLL VENOUS BLD VENIPUNCTURE: CPT

## 2023-10-31 PROCEDURE — 85025 COMPLETE CBC W/AUTO DIFF WBC: CPT

## 2023-10-31 PROCEDURE — 86901 BLOOD TYPING SEROLOGIC RH(D): CPT | Performed by: ORTHOPAEDIC SURGERY

## 2023-10-31 PROCEDURE — 83036 HEMOGLOBIN GLYCOSYLATED A1C: CPT

## 2023-10-31 PROCEDURE — 86140 C-REACTIVE PROTEIN: CPT

## 2023-10-31 PROCEDURE — 80053 COMPREHEN METABOLIC PANEL: CPT

## 2023-10-31 PROCEDURE — 86900 BLOOD TYPING SEROLOGIC ABO: CPT | Performed by: ORTHOPAEDIC SURGERY

## 2023-10-31 RX ORDER — FERROUS SULFATE 324(65)MG
324 TABLET, DELAYED RELEASE (ENTERIC COATED) ORAL DAILY
Qty: 30 TABLET | Refills: 1 | Status: SHIPPED | OUTPATIENT
Start: 2023-10-31

## 2023-10-31 RX ORDER — CHLORHEXIDINE GLUCONATE ORAL RINSE 1.2 MG/ML
15 SOLUTION DENTAL ONCE
OUTPATIENT
Start: 2023-10-31 | End: 2023-10-31

## 2023-10-31 RX ORDER — ACETAMINOPHEN 325 MG/1
975 TABLET ORAL ONCE
OUTPATIENT
Start: 2023-10-31 | End: 2023-10-31

## 2023-10-31 RX ORDER — SODIUM CHLORIDE 9 MG/ML
75 INJECTION, SOLUTION INTRAVENOUS CONTINUOUS
OUTPATIENT
Start: 2023-10-31

## 2023-10-31 RX ORDER — CHLORHEXIDINE GLUCONATE 4 G/100ML
SOLUTION TOPICAL DAILY PRN
OUTPATIENT
Start: 2023-10-31

## 2023-10-31 RX ORDER — ASCORBIC ACID 500 MG
500 TABLET ORAL DAILY
Qty: 30 TABLET | Refills: 1 | Status: SHIPPED | OUTPATIENT
Start: 2023-10-31

## 2023-10-31 RX ORDER — TRANEXAMIC ACID 10 MG/ML
1000 INJECTION, SOLUTION INTRAVENOUS ONCE
OUTPATIENT
Start: 2023-10-31 | End: 2023-10-31

## 2023-10-31 RX ORDER — GABAPENTIN 300 MG/1
300 CAPSULE ORAL ONCE
OUTPATIENT
Start: 2023-10-31 | End: 2023-10-31

## 2023-10-31 RX ORDER — MULTIVITAMIN
1 TABLET ORAL DAILY
Qty: 30 TABLET | Refills: 1 | Status: SHIPPED | OUTPATIENT
Start: 2023-10-31

## 2023-10-31 RX ORDER — FOLIC ACID 1 MG/1
1 TABLET ORAL DAILY
Qty: 30 TABLET | Refills: 1 | Status: SHIPPED | OUTPATIENT
Start: 2023-10-31

## 2023-10-31 RX ORDER — CEFAZOLIN SODIUM 2 G/50ML
2000 SOLUTION INTRAVENOUS ONCE
OUTPATIENT
Start: 2023-10-31 | End: 2023-10-31

## 2023-10-31 NOTE — TELEPHONE ENCOUNTER
Caller: Patient    Doctor: Vandy Habermann    Reason for call: Patient had her labs completed this morning for her surgery on her left hip and was told by the lab that they didn't see a surgery was scheduled. She also misplaced all her paperwork regarding the surgery and is asking if someone can call her back to discuss what she needs to do prior to the surgery. She thinks she should me taking mediation prior to the surgery, but can't remember.      Call back#: [phone]

## 2023-10-31 NOTE — TELEPHONE ENCOUNTER
I spoke with patient and answered all her questions, she understoood. Dr. Deloris Ann if you can send her preop vitamins to [facility] pharmacy on [address] in [place] [facility].  P# [phone]

## 2023-11-02 ENCOUNTER — PATIENT OUTREACH (OUTPATIENT)
Dept: OBGYN CLINIC | Facility: HOSPITAL | Age: 48
End: 2023-11-02

## 2023-11-02 ENCOUNTER — TELEPHONE (OUTPATIENT)
Dept: OBGYN CLINIC | Facility: HOSPITAL | Age: 48
End: 2023-11-02

## 2023-11-02 DIAGNOSIS — M16.11 PRIMARY OSTEOARTHRITIS OF ONE HIP, RIGHT: Primary | ICD-10-CM

## 2023-11-02 NOTE — PROGRESS NOTES
MELINDA received a new referral in regard to pt scheduled for arthroplasty of right hip on 11/29/2023. MELINDA had worked with pt previously in regard to her [place] DX. Pt was required to obtain  clearance before proceeding with surgery. Pt did obtain clearance from [facility] on 08/01/2023 and it was scanned into media on chart. Pt is now proceeding to surgery on 11/29/2023. Today MELINDA consulted with NN in regard to DC concerns. Pt spoke with NN and advised she has no transportation to and from procedure or to OP PT. Pt plans to take an uber/lyft to procedure and has requested in home PT. Pt also shared she has no caregiver support postoperatively. I contacted pt today and reintroduced myself and role. Pt did confirm she plans on taking uber/lyft to the procedure. SWCM inquired who will take pt home after surgery. Per pt she has not planned that because she does not know when she is going home. I did advise pt the plan is for her to DC the day after procedure. Pt then shared that after any previous procedure she has had complications, infections, severe pain etc.... That all of her previous procedures she has a long length of stay. Pt believes she will have a long LOS postoperatively. I encouraged pt to start to plan her ride home for the day after surgery. Per pt she has one friend that she can ask ( her emergency contact ). Pt also shares her one friend will be taking care of her dogs while she has surgery and postoperatively. Pt states she has no friends and family to provide caregiver support. Pt unable to consider self pay [facility], as pt receives $900.00 per month income and has limited financial options. MELINDA did note, pt used to have an ICM through [facility] to assist her. Per pt today, she had an issue with the agency that provided her previous ICM and she was discharged.  Pt is supposed to receive information about her new ICM on November 21st. However, pt states her ICM is unable to provide any transportation. Pt has already requested in home PT after surgery. [facility] inquired if pt has interest in [place] and pt has applied for [place] previously but was denied because she has no disability and can take the regular bus service. However, pt does drive so she receives reimbursement for mileage to medical appts. ( Pt will be unable to drive postoperatively) Pt has agreed at this time to ask friends and work on getting a ride home from surgery. In regard to caregiver support, pt states she has no one to ask for support and unable to afford self pay HHC. Pt is independent with ADLs and ambulation. Pt would not be eligible for [place] services. [facility] will contact [facility] [employer] to inquire if there are any programs available to pt temporarily postoperatively. Pt is aware and in agreement with plan. [facility] will follow up with pt next week. [facility] contacted [facility] [employer] @ [phone] after speaking with pt and spoke to Nicolas Rodarte. Nicolas Rodarte took the referral for pt for individuals under 60 for the OPTIONS program. There is a wait list and pt will need to qualify for eligibility. Pt was added to the wait list today and will be contacted to schedule an assessment. Pt will also be offered Meals on Wheels postoperatively. [facility] will continue to follow and support pt. Nicolas Rodarte will follow up with [facility] after assessment.

## 2023-11-02 NOTE — TELEPHONE ENCOUNTER
Preoperative Elective Admission Assessment    Who does pt live with: lives alone  What kind of home: single level apartment  How do they enter the home: back  How many levels in home: 1   # of steps to enter home: 3  # of steps to second floor: n/a  Are there handrails: Yes  Are there landings: No  Sleeping arrangement: first/entry floor  Where is Bathroom: entry level  Where is the tub or shower: Step in bath tub w/o grab bars or shower chair  Dogs or ther pets: 2 dogs     First Floor Setup:   Is there a bathroom: Yes  Where would pt sleep: bed     DME: rolling walker, cane, and crutches  We discussed clearing pathways in the home and making sure there is accessibly to use the walker, for example, removing throw rugs. Patient's Current Level of Function: Ambulates: Independently, Ambulates with walker, Ambulates with cane, Ambulates with crutches, and ADLs: Independent    Post-op Caregiver:  Unknown  Caregiver Name and phone number for Inpatient discharge needs: unknown  Currently receive any HHC/aides/community supports: unknown     Post-op Transport:  uber  To/from hospital:  uber to, unsure how she is getting home  To/from PT 2-3x/week:  unknown  Uses community transport now: No     Outpatient Physical Therapy Site:  Site: Patient requesting in home PT  pre and post-op appts scheduled? No     Medication Management: self and pillbox  Preferred Pharmacy for Post-op Medications: [facility]  Blood Management Vitamin Regimen: Pt confirms taking as prescribed  Post-op anticoagulant: to be determined by surgical team postoperatively     DC Plan: Pt plans to be discharged home    Barriers to DC identified preoperatively: caregiver support and transportation    BMI: 32.72    Patient Education:  Pt educated on post-op pain, early mobilization (POD0), LOS goals, OP PT goals, and preoperative bathing.  Patient educated that our goal is to appropriately discharge patient based off their post-op function while striving to maintain maximal independence. The goal is to discharge patient to home and for them to attend outpatient physical therapy.     Assigned to care team? Yes    SW referral: Placed for transportation and caregiver support

## 2023-11-07 ENCOUNTER — PATIENT OUTREACH (OUTPATIENT)
Dept: OBGYN CLINIC | Facility: HOSPITAL | Age: 48
End: 2023-11-07

## 2023-11-07 NOTE — PROGRESS NOTES
REBECCA received a call from Casey from the [facility]. Casey contacted [facility] as a courtesy and advised that after speaking to pt via phone for initial assessment, that she believes pt has a less than one percent chance of being eligible for services based on medical need. Casey understands that pts has surgery upcoming and wanted [facility] to be aware pt will not have services through the [facility]. MELINDA appreciated the call. [facility] will make NN aware. At this time, pt is stating she has no friends/ family for caregiver support, unable to afford self pay caregiver support, and is not eligible for services through the [facility]. [facility] will continue to support pt.

## 2023-11-08 ENCOUNTER — TELEPHONE (OUTPATIENT)
Age: 48
End: 2023-11-08

## 2023-11-08 NOTE — TELEPHONE ENCOUNTER
Caller: Fabiana from [facility]    Doctor: Stevan    Reason for call: patient scheduled for sx 11/29 and the office needs PAT directions.     Call back#:  [phone]    Accidentally disco'd call trying to get sx scheduling team

## 2023-11-09 ENCOUNTER — APPOINTMENT (OUTPATIENT)
Dept: PREADMISSION TESTING | Facility: HOSPITAL | Age: 48
End: 2023-11-09
Payer: MEDICARE

## 2023-11-09 ENCOUNTER — PATIENT OUTREACH (OUTPATIENT)
Dept: OBGYN CLINIC | Facility: HOSPITAL | Age: 48
End: 2023-11-09

## 2023-11-09 NOTE — TELEPHONE ENCOUNTER
I called the [facility] practice and spoke with Fabiana, She is asking for a clearance form. I asked for the fax# and I will be faxing it over. Fax# [phone].

## 2023-11-09 NOTE — PROGRESS NOTES
MELINDACM received an IB from surgeon that if pt does not have caregiver support planned her surgery will need to be postponed. MELINDACM consulted with NN and SWCM will contact pt to inform her of same. I contacted pt today and advised her that since she will not be eligible for [facility] services for caregiver support that until caregiver support is arranged her surgery will need to be postponed. Pt shared today, that she has found caregiver support. Per pt after we spoke last time, she spoke with her friend Lemuel Gallegos who had planned to watch her dogs, has agreed to stay with pt postoperatively to provide support. He will staying with pt overnight at [facility] three nights /days and provide ongoing support. Pt is also working on obtaining [employer] to have for transportation. Pt is requesting two weeks in home PT for after surgery. REBECCA also expressed to pt that her DC will be the day after surgery so pt can arrange transportation. Pt reports no other needs at this time. REBECCA will follow up with NN and surgeon in regard to same. SWCM will remain available.

## 2023-11-09 NOTE — PRE-PROCEDURE INSTRUCTIONS
Pre-Surgery Instructions:   Medication Instructions    amLODIPine (NORVASC) 5 mg tablet Take night before surgery    amphetamine-dextroamphetamine (ADDERALL) 20 mg tablet Hold day of surgery. ascorbic acid (VITAMIN C) 500 MG tablet Hold day of surgery. benztropine (COGENTIN) 1 mg tablet Take night before surgery    celecoxib (CeleBREX) 200 mg capsule Stop taking 3 days prior to surgery. diazepam (VALIUM) 5 mg tablet Take day of surgery. doxepin (SINEquan) 100 mg capsule Take night before surgery    estradiol (ESTRACE) 0.5 MG tablet Take day of surgery. estradiol (ESTRACE) 2 MG tablet Take night before surgery    ferrous sulfate 324 (65 Fe) mg Hold day of surgery. fluPHENAZine (PROLIXIN) 5 mg tablet Take day of surgery. folic acid (FOLVITE) 1 mg tablet Hold day of surgery. gabapentin (NEURONTIN) 300 mg capsule Take night before surgery    guaiFENesin (MUCINEX) 600 mg 12 hr tablet Uses PRN- OK to take day of surgery    haloperidol (HALDOL) 10 mg tablet Take night before surgery    lidocaine (XYLOCAINE) 5 % ointment Hold day of surgery. liothyronine (CYTOMEL) 5 mcg tablet Take day of surgery. loratadine (CLARITIN) 10 mg tablet Uses PRN- OK to take day of surgery    montelukast (SINGULAIR) 10 mg tablet Take night before surgery    Multiple Vitamin (multivitamin) tablet Hold day of surgery. NON FORMULARY Hold day of surgery. ondansetron (ZOFRAN-ODT) 4 mg disintegrating tablet Uses PRN- OK to take day of surgery    potassium chloride (K-DUR,KLOR-CON) 10 mEq tablet Take day of surgery. prazosin (MINIPRESS) 5 mg capsule Take day of surgery. prazosin (MINIPRESS) 5 mg capsule Take night before surgery    simvastatin (ZOCOR) 20 mg tablet Take night before surgery    Stimulant Laxative 8.6-50 MG per tablet Hold day of surgery. tiZANidine (ZANAFLEX) 4 mg tablet Uses PRN- OK to take day of surgery    Trintellix 10 MG tablet Take day of surgery. Confirmed patient received ortho bag. Reviewed contents and uses. Encouraged to read [facility] program patient education booklet. Medication instructions for day surgery reviewed. Please use only a sip of water to take your instructed medications. Avoid all over the counter vitamins, supplements and NSAIDS for one week prior to surgery per anesthesia guidelines. Tylenol is ok to take as needed. You will receive a call one business day prior to surgery with an arrival time and hospital directions. If your surgery is scheduled on a Monday, the hospital will be calling you on the Friday prior to your surgery. If you have not heard from anyone by 8pm, please call the hospital supervisor through the hospital  at [phone]. Rachel Blunt [phone]). Do not eat or drink anything after midnight the night before your surgery, including candy, mints, lifesavers, or chewing gum. Do not drink alcohol 24hrs before your surgery. Try not to smoke at least 24hrs before your surgery. Follow the pre surgery showering instructions as listed in the [facility] Surgical Experience Booklet” or otherwise provided by your surgeon's office. Do not use a blade to shave the surgical area 1 week before surgery. It is okay to use a clean electric clippers up to 24 hours before surgery. Do not apply any lotions, creams, including makeup, cologne, deodorant, or perfumes after showering on the day of your surgery. Do not use dry shampoo, hair spray, hair gel, or any type of hair products. No contact lenses, eye make-up, or artificial eyelashes. Remove nail polish, including gel polish, and any artificial, gel, or acrylic nails if possible. Remove all jewelry including rings and body piercing jewelry. Wear causal clothing that is easy to take on and off. Consider your type of surgery. Keep any valuables, jewelry, piercings at home. Please bring any specially ordered equipment (sling, braces) if indicated.     Arrange for a responsible person to drive you to and from the hospital on the day of your surgery. Visitor Guidelines discussed. Call the surgeon's office with any new illnesses, exposures, or additional questions prior to surgery. Please reference your [facility] Surgical Experience Booklet” for additional information to prepare for your upcoming surgery.

## 2023-11-14 ENCOUNTER — TELEPHONE (OUTPATIENT)
Age: 48
End: 2023-11-14

## 2023-11-14 NOTE — TELEPHONE ENCOUNTER
Caller: Patient    Doctor: Harris Walker    Reason for call: Would like to speak to  or [facility] regarding her PCP appointment from today    Call back#: [phone] Restart Methimazole 5 mg in AM     LAbs in 6 weeks and FU a week after      TSH , FT4

## 2023-11-15 NOTE — TELEPHONE ENCOUNTER
Spoke with patient at length. She voices concern over the clearances that we are requesting. She notes that narcotic abuse was documented which she states was not the case. She reports the accidental relapse on cocaine, but denies any issues with opioids in the past. She reports being on vicodin which was prescribed by [facility]. States it was dc after she tested negative for it. I explained that we are looking for post op pain regimen recommendations since she does have a history of drug overdose regardless of if it involved opioids or not. Explained the importance of determining a safe post op pain regimen for her given her history and current various psych medications that she is on. Patient is nervous for upcoming procedure given her struggles with previous surgery. I let her know that  will reach out regarding transportation and to confirm plan for post op care. dionne Parson it looks like PCP deferred pain regimen recs to patients previous pain management specialist [facility]). Vane Salinas, patient also voiced concerns over transportation and post op care. She will have a friend staying with her initially, but is still unsure of her transportation. Can you please revisit this? Thanks!

## 2023-11-16 ENCOUNTER — PATIENT OUTREACH (OUTPATIENT)
Dept: OBGYN CLINIC | Facility: HOSPITAL | Age: 48
End: 2023-11-16

## 2023-11-16 NOTE — PROGRESS NOTES
[facility] contacted pt today to follow up in regard to Skyler Chen. [facility] last spoke to pt on 11/09/2023. Pt was advised that if she does not have any caregiver support, her surgery will need to be postponed. Pt then advised her friend Darwin Munoz who was going to watch pts dogs, has now agreed to stay with pt a few days postoperatively. Today pt shared she may uber to surgery or have a friend take her depending on what time her surgery will be. [facility] inquired if pt has applied for [employer] and per pt she called [employer] and requested a new application be sent to her, but she has not received it yet. If she does not have it by tomorrow she will call and request a new one. Pt has already used her lifetime 60 day approval, so she is not eligible for that. Pt has also requested in home PT postoperatively. Pt has PTSD and prefers not taking public transportation. Pt shares she has applied for [employer] before and was denied and instead was approved for mileage reimbursement. Pt is in agreement to continue [employer] application process. [facility] is unable to refer pt to [facility] as pts PCP is not through [facility]. Pt has caregiver support planned, care for her dogs planned, transportation to and from surgery. Pt is requesting in home PT for first twoo weeks postoperatively and applying for [employer]. [facility] will remain available and continue to support pt . -Pt requested an order for a shower chair, [facility] messaged NN in regard to same.

## 2023-11-16 NOTE — TELEPHONE ENCOUNTER
Eduardo Mccrary yes just got the clearance from PCP office and they would like to referral her to pain mangement PO. I left the clearance on Dr. Dani crocker.

## 2023-11-17 NOTE — TELEPHONE ENCOUNTER
Jimbo Gibbons, can you please send a form to [facility] for them to fill out with post op pain recommendations since PCP deferred to them? We would like to have this info prior to surgery. Thanks! Tiffany Mckeon, thanks for the info. Can you please let Dr Toyin Rm and I know once transportation is confirmed? Thanks!

## 2023-11-20 ENCOUNTER — TELEPHONE (OUTPATIENT)
Age: 48
End: 2023-11-20

## 2023-11-20 NOTE — TELEPHONE ENCOUNTER
Caller: Patient    Doctor/Office: Stevan    Call regarding :  Questions re:shower chair.  Request speak w/Anju     Call was transferred to: Jo Holguin

## 2023-11-20 NOTE — TELEPHONE ENCOUNTER
Form was faxed to [facility], once received back with recommendations I will place on Dr. Marisabel crocker for review.

## 2023-11-21 ENCOUNTER — PATIENT OUTREACH (OUTPATIENT)
Dept: OBGYN CLINIC | Facility: HOSPITAL | Age: 48
End: 2023-11-21

## 2023-11-21 NOTE — PROGRESS NOTES
REBECCA contacted pt to follow up about the Shanita Rile application. Per pt she did receive the application and the medical certification needs to be completed by her PCP. Pt has an appt with her PCP on 11/27 and plans to take the form to PCP then. I did advise pt to complete her portion of the application now, and pt expressed understanding. The application process is a two week process, and pt was made aware. Pt has requested two weeks in home PT postoperatively and plans to use [employer] services after that period. [facility] will remain available.

## 2023-11-25 LAB
DME PARACHUTE DELIVERY DATE ACTUAL: NORMAL
DME PARACHUTE DELIVERY DATE REQUESTED: NORMAL
DME PARACHUTE ITEM DESCRIPTION: NORMAL
DME PARACHUTE ORDER STATUS: NORMAL
DME PARACHUTE SUPPLIER NAME: NORMAL
DME PARACHUTE SUPPLIER PHONE: NORMAL

## 2023-11-28 ENCOUNTER — PATIENT OUTREACH (OUTPATIENT)
Dept: OBGYN CLINIC | Facility: HOSPITAL | Age: 48
End: 2023-11-28

## 2023-11-28 ENCOUNTER — ANESTHESIA EVENT (OUTPATIENT)
Dept: PERIOP | Facility: HOSPITAL | Age: 48
End: 2023-11-28
Payer: MEDICARE

## 2023-11-28 NOTE — PROGRESS NOTES
MELINDA contacted pt today to follow up in regard to Latrel[facility] Bunkers application. Pt shared today that she did have PCP complete the physician certification form and it was mailed back to [facility] on 11/27. The plan is for pt to have in home PT for two weeks postoperatively and then use the [facility]ers for the appt after. Pt also shared that her PCP has completed paperwork requesting a home health aide for pt. Pt was provided the paperwork from Rehana Fragoso from the Wellness Team at [facility]. Pt did share she is nervous for the procedure tomorrow and [facility] provided supportive listening. [facility] will remain available and follow up with pt after surgery.

## 2023-11-29 ENCOUNTER — APPOINTMENT (OUTPATIENT)
Dept: RADIOLOGY | Facility: HOSPITAL | Age: 48
End: 2023-11-29
Payer: MEDICARE

## 2023-11-29 ENCOUNTER — HOSPITAL ENCOUNTER (OUTPATIENT)
Facility: HOSPITAL | Age: 48
Setting detail: OUTPATIENT SURGERY
Discharge: HOME HEALTH | End: 2023-11-30
Attending: ORTHOPAEDIC SURGERY | Admitting: ORTHOPAEDIC SURGERY
Payer: MEDICARE

## 2023-11-29 ENCOUNTER — TELEPHONE (OUTPATIENT)
Dept: OBGYN CLINIC | Facility: MEDICAL CENTER | Age: 48
End: 2023-11-29

## 2023-11-29 ENCOUNTER — ANESTHESIA (OUTPATIENT)
Dept: PERIOP | Facility: HOSPITAL | Age: 48
End: 2023-11-29
Payer: MEDICARE

## 2023-11-29 DIAGNOSIS — Z96.641 STATUS POST TOTAL HIP REPLACEMENT, RIGHT: Primary | ICD-10-CM

## 2023-11-29 LAB
ABO GROUP BLD: NORMAL
AMPHETAMINES SERPL QL SCN: POSITIVE
BARBITURATES UR QL: NEGATIVE
BENZODIAZ UR QL: NEGATIVE
COCAINE UR QL: NEGATIVE
METHADONE UR QL: NEGATIVE
OPIATES UR QL SCN: NEGATIVE
OXYCODONE+OXYMORPHONE UR QL SCN: NEGATIVE
PCP UR QL: NEGATIVE
RH BLD: POSITIVE
THC UR QL: POSITIVE

## 2023-11-29 PROCEDURE — 80307 DRUG TEST PRSMV CHEM ANLYZR: CPT | Performed by: ORTHOPAEDIC SURGERY

## 2023-11-29 PROCEDURE — C1776 JOINT DEVICE (IMPLANTABLE): HCPCS | Performed by: ORTHOPAEDIC SURGERY

## 2023-11-29 PROCEDURE — C1713 ANCHOR/SCREW BN/BN,TIS/BN: HCPCS | Performed by: ORTHOPAEDIC SURGERY

## 2023-11-29 PROCEDURE — 97110 THERAPEUTIC EXERCISES: CPT

## 2023-11-29 PROCEDURE — 73502 X-RAY EXAM HIP UNI 2-3 VIEWS: CPT

## 2023-11-29 PROCEDURE — 73501 X-RAY EXAM HIP UNI 1 VIEW: CPT

## 2023-11-29 PROCEDURE — 0054T BONE SRGRY CMPTR FLUOR IMAGE: CPT | Performed by: PHYSICIAN ASSISTANT

## 2023-11-29 PROCEDURE — 97163 PT EVAL HIGH COMPLEX 45 MIN: CPT

## 2023-11-29 PROCEDURE — 27130 TOTAL HIP ARTHROPLASTY: CPT | Performed by: ORTHOPAEDIC SURGERY

## 2023-11-29 PROCEDURE — 0054T BONE SRGRY CMPTR FLUOR IMAGE: CPT | Performed by: ORTHOPAEDIC SURGERY

## 2023-11-29 PROCEDURE — 27130 TOTAL HIP ARTHROPLASTY: CPT | Performed by: PHYSICIAN ASSISTANT

## 2023-11-29 DEVICE — BIOLOX DELTA CERAMIC FEMORAL HEAD 32MM DIA +1 12/14 TAPER
Type: IMPLANTABLE DEVICE | Site: HIP | Status: FUNCTIONAL
Brand: BIOLOX DELTA

## 2023-11-29 DEVICE — PINNACLE POROCOAT ACETABULAR SHELL SECTOR II 48MM OD
Type: IMPLANTABLE DEVICE | Site: HIP | Status: FUNCTIONAL
Brand: PINNACLE POROCOAT

## 2023-11-29 DEVICE — ACTIS DUOFIX HIP PROSTHESIS (FEMORAL STEM 12/14 TAPER CEMENTLESS SIZE 2 HIGH COLLAR)  CE
Type: IMPLANTABLE DEVICE | Site: HIP | Status: FUNCTIONAL
Brand: ACTIS

## 2023-11-29 DEVICE — PINNACLE CANCELLOUS BONE SCREW 6.5MM X 15MM
Type: IMPLANTABLE DEVICE | Site: HIP | Status: FUNCTIONAL
Brand: PINNACLE

## 2023-11-29 DEVICE — PINNACLE HIP SOLUTIONS ALTRX POLYETHYLENE ACETABULAR LINER NEUTRAL 32MM ID 48MM OD
Type: IMPLANTABLE DEVICE | Site: HIP | Status: FUNCTIONAL
Brand: PINNACLE ALTRX

## 2023-11-29 RX ORDER — CHLORHEXIDINE GLUCONATE ORAL RINSE 1.2 MG/ML
15 SOLUTION DENTAL ONCE
Status: COMPLETED | OUTPATIENT
Start: 2023-11-29 | End: 2023-11-29

## 2023-11-29 RX ORDER — SODIUM CHLORIDE 9 MG/ML
INJECTION, SOLUTION INTRAVENOUS AS NEEDED
Status: DISCONTINUED | OUTPATIENT
Start: 2023-11-29 | End: 2023-11-29 | Stop reason: HOSPADM

## 2023-11-29 RX ORDER — LIOTHYRONINE SODIUM 5 UG/1
5 TABLET ORAL DAILY
Status: DISCONTINUED | OUTPATIENT
Start: 2023-11-30 | End: 2023-11-30 | Stop reason: HOSPADM

## 2023-11-29 RX ORDER — DOXEPIN HYDROCHLORIDE 100 MG/1
100 CAPSULE ORAL
Status: DISCONTINUED | OUTPATIENT
Start: 2023-11-29 | End: 2023-11-30 | Stop reason: HOSPADM

## 2023-11-29 RX ORDER — MIDAZOLAM HYDROCHLORIDE 2 MG/2ML
INJECTION, SOLUTION INTRAMUSCULAR; INTRAVENOUS AS NEEDED
Status: DISCONTINUED | OUTPATIENT
Start: 2023-11-29 | End: 2023-11-29

## 2023-11-29 RX ORDER — CEFAZOLIN SODIUM 2 G/50ML
2000 SOLUTION INTRAVENOUS ONCE
Status: COMPLETED | OUTPATIENT
Start: 2023-11-29 | End: 2023-11-29

## 2023-11-29 RX ORDER — ACETAMINOPHEN 325 MG/1
975 TABLET ORAL EVERY 8 HOURS SCHEDULED
Status: DISCONTINUED | OUTPATIENT
Start: 2023-11-29 | End: 2023-11-30 | Stop reason: HOSPADM

## 2023-11-29 RX ORDER — LORATADINE 10 MG/1
10 TABLET ORAL
Status: DISCONTINUED | OUTPATIENT
Start: 2023-11-29 | End: 2023-11-30 | Stop reason: HOSPADM

## 2023-11-29 RX ORDER — FLUPHENAZINE HYDROCHLORIDE 2.5 MG/1
5 TABLET ORAL
Status: DISCONTINUED | OUTPATIENT
Start: 2023-11-29 | End: 2023-11-30 | Stop reason: HOSPADM

## 2023-11-29 RX ORDER — ACETAMINOPHEN 500 MG
1000 TABLET ORAL EVERY 8 HOURS
Refills: 0
Start: 2023-11-29

## 2023-11-29 RX ORDER — PRAZOSIN HYDROCHLORIDE 5 MG/1
10 CAPSULE ORAL
Status: DISCONTINUED | OUTPATIENT
Start: 2023-11-29 | End: 2023-11-30 | Stop reason: HOSPADM

## 2023-11-29 RX ORDER — MAGNESIUM HYDROXIDE 1200 MG/15ML
LIQUID ORAL AS NEEDED
Status: DISCONTINUED | OUTPATIENT
Start: 2023-11-29 | End: 2023-11-29 | Stop reason: HOSPADM

## 2023-11-29 RX ORDER — PROPOFOL 10 MG/ML
INJECTION, EMULSION INTRAVENOUS AS NEEDED
Status: DISCONTINUED | OUTPATIENT
Start: 2023-11-29 | End: 2023-11-29

## 2023-11-29 RX ORDER — SODIUM CHLORIDE, SODIUM LACTATE, POTASSIUM CHLORIDE, CALCIUM CHLORIDE 600; 310; 30; 20 MG/100ML; MG/100ML; MG/100ML; MG/100ML
INJECTION, SOLUTION INTRAVENOUS CONTINUOUS PRN
Status: DISCONTINUED | OUTPATIENT
Start: 2023-11-29 | End: 2023-11-29

## 2023-11-29 RX ORDER — METOPROLOL TARTRATE 1 MG/ML
INJECTION, SOLUTION INTRAVENOUS AS NEEDED
Status: DISCONTINUED | OUTPATIENT
Start: 2023-11-29 | End: 2023-11-29

## 2023-11-29 RX ORDER — MONTELUKAST SODIUM 10 MG/1
10 TABLET ORAL
Status: DISCONTINUED | OUTPATIENT
Start: 2023-11-29 | End: 2023-11-30 | Stop reason: HOSPADM

## 2023-11-29 RX ORDER — GABAPENTIN 300 MG/1
300 CAPSULE ORAL EVERY MORNING
Status: DISCONTINUED | OUTPATIENT
Start: 2023-11-29 | End: 2023-11-30 | Stop reason: HOSPADM

## 2023-11-29 RX ORDER — ENOXAPARIN SODIUM 100 MG/ML
40 INJECTION SUBCUTANEOUS DAILY
Status: DISCONTINUED | OUTPATIENT
Start: 2023-11-29 | End: 2023-11-30 | Stop reason: HOSPADM

## 2023-11-29 RX ORDER — DOCUSATE SODIUM 100 MG/1
100 CAPSULE, LIQUID FILLED ORAL 2 TIMES DAILY
Qty: 30 CAPSULE | Refills: 0 | Status: SHIPPED | OUTPATIENT
Start: 2023-11-29

## 2023-11-29 RX ORDER — CHLORHEXIDINE GLUCONATE 4 G/100ML
SOLUTION TOPICAL DAILY PRN
Status: DISCONTINUED | OUTPATIENT
Start: 2023-11-29 | End: 2023-11-29 | Stop reason: HOSPADM

## 2023-11-29 RX ORDER — OXYCODONE HYDROCHLORIDE 5 MG/1
5 TABLET ORAL EVERY 8 HOURS PRN
Qty: 9 TABLET | Refills: 0 | Status: CANCELLED | OUTPATIENT
Start: 2023-11-29

## 2023-11-29 RX ORDER — BENZTROPINE MESYLATE 1 MG/1
1 TABLET ORAL
Status: DISCONTINUED | OUTPATIENT
Start: 2023-11-29 | End: 2023-11-30 | Stop reason: HOSPADM

## 2023-11-29 RX ORDER — BISACODYL 10 MG
10 SUPPOSITORY, RECTAL RECTAL DAILY PRN
Status: DISCONTINUED | OUTPATIENT
Start: 2023-11-29 | End: 2023-11-30 | Stop reason: HOSPADM

## 2023-11-29 RX ORDER — ASPIRIN 325 MG
325 TABLET ORAL 2 TIMES DAILY
Qty: 84 TABLET | Refills: 0 | Status: SHIPPED | OUTPATIENT
Start: 2023-11-29 | End: 2023-12-27

## 2023-11-29 RX ORDER — HYDROMORPHONE HCL/PF 1 MG/ML
SYRINGE (ML) INJECTION AS NEEDED
Status: DISCONTINUED | OUTPATIENT
Start: 2023-11-29 | End: 2023-11-29

## 2023-11-29 RX ORDER — DOCUSATE SODIUM 100 MG/1
100 CAPSULE, LIQUID FILLED ORAL 2 TIMES DAILY
Status: DISCONTINUED | OUTPATIENT
Start: 2023-11-29 | End: 2023-11-30 | Stop reason: HOSPADM

## 2023-11-29 RX ORDER — HYDRALAZINE HYDROCHLORIDE 20 MG/ML
INJECTION INTRAMUSCULAR; INTRAVENOUS AS NEEDED
Status: DISCONTINUED | OUTPATIENT
Start: 2023-11-29 | End: 2023-11-29

## 2023-11-29 RX ORDER — OXYCODONE HYDROCHLORIDE 5 MG/1
5 TABLET ORAL EVERY 8 HOURS PRN
Qty: 9 TABLET | Refills: 0 | Status: SHIPPED | OUTPATIENT
Start: 2023-11-29 | End: 2023-11-29

## 2023-11-29 RX ORDER — SODIUM CHLORIDE, SODIUM LACTATE, POTASSIUM CHLORIDE, CALCIUM CHLORIDE 600; 310; 30; 20 MG/100ML; MG/100ML; MG/100ML; MG/100ML
75 INJECTION, SOLUTION INTRAVENOUS CONTINUOUS
Status: DISCONTINUED | OUTPATIENT
Start: 2023-11-29 | End: 2023-11-30 | Stop reason: HOSPADM

## 2023-11-29 RX ORDER — FENTANYL CITRATE/PF 50 MCG/ML
25 SYRINGE (ML) INJECTION
Status: COMPLETED | OUTPATIENT
Start: 2023-11-29 | End: 2023-11-29

## 2023-11-29 RX ORDER — ACETAMINOPHEN 325 MG/1
975 TABLET ORAL ONCE
Status: COMPLETED | OUTPATIENT
Start: 2023-11-29 | End: 2023-11-29

## 2023-11-29 RX ORDER — CELECOXIB 100 MG/1
200 CAPSULE ORAL 2 TIMES DAILY
Status: DISCONTINUED | OUTPATIENT
Start: 2023-11-29 | End: 2023-11-30 | Stop reason: HOSPADM

## 2023-11-29 RX ORDER — CEPHALEXIN 500 MG/1
500 CAPSULE ORAL EVERY 8 HOURS SCHEDULED
Status: DISCONTINUED | OUTPATIENT
Start: 2023-11-30 | End: 2023-11-30 | Stop reason: HOSPADM

## 2023-11-29 RX ORDER — CEPHALEXIN 500 MG/1
500 CAPSULE ORAL EVERY 8 HOURS SCHEDULED
Qty: 30 CAPSULE | Refills: 0 | Status: SHIPPED | OUTPATIENT
Start: 2023-11-29 | End: 2023-12-09

## 2023-11-29 RX ORDER — SENNOSIDES 8.6 MG
1 TABLET ORAL DAILY
Status: DISCONTINUED | OUTPATIENT
Start: 2023-11-29 | End: 2023-11-30 | Stop reason: HOSPADM

## 2023-11-29 RX ORDER — DEXTROAMPHETAMINE SACCHARATE, AMPHETAMINE ASPARTATE, DEXTROAMPHETAMINE SULFATE AND AMPHETAMINE SULFATE 2.5; 2.5; 2.5; 2.5 MG/1; MG/1; MG/1; MG/1
20 TABLET ORAL
Status: DISCONTINUED | OUTPATIENT
Start: 2023-11-29 | End: 2023-11-30 | Stop reason: HOSPADM

## 2023-11-29 RX ORDER — POTASSIUM CHLORIDE 750 MG/1
10 TABLET, EXTENDED RELEASE ORAL 2 TIMES DAILY
Status: DISCONTINUED | OUTPATIENT
Start: 2023-11-29 | End: 2023-11-30 | Stop reason: HOSPADM

## 2023-11-29 RX ORDER — CALCIUM CARBONATE 500 MG/1
1000 TABLET, CHEWABLE ORAL DAILY PRN
Status: DISCONTINUED | OUTPATIENT
Start: 2023-11-29 | End: 2023-11-30 | Stop reason: HOSPADM

## 2023-11-29 RX ORDER — DIAZEPAM 5 MG/1
5 TABLET ORAL 3 TIMES DAILY PRN
Status: DISCONTINUED | OUTPATIENT
Start: 2023-11-29 | End: 2023-11-30 | Stop reason: HOSPADM

## 2023-11-29 RX ORDER — OXYCODONE HYDROCHLORIDE 10 MG/1
10 TABLET ORAL EVERY 6 HOURS PRN
Status: DISCONTINUED | OUTPATIENT
Start: 2023-11-29 | End: 2023-11-30 | Stop reason: HOSPADM

## 2023-11-29 RX ORDER — TRANEXAMIC ACID 10 MG/ML
1000 INJECTION, SOLUTION INTRAVENOUS ONCE
Status: COMPLETED | OUTPATIENT
Start: 2023-11-29 | End: 2023-11-29

## 2023-11-29 RX ORDER — LIDOCAINE HYDROCHLORIDE 10 MG/ML
INJECTION, SOLUTION EPIDURAL; INFILTRATION; INTRACAUDAL; PERINEURAL AS NEEDED
Status: DISCONTINUED | OUTPATIENT
Start: 2023-11-29 | End: 2023-11-29

## 2023-11-29 RX ORDER — TIZANIDINE 4 MG/1
4 TABLET ORAL EVERY 6 HOURS PRN
Status: DISCONTINUED | OUTPATIENT
Start: 2023-11-29 | End: 2023-11-30 | Stop reason: HOSPADM

## 2023-11-29 RX ORDER — ASCORBIC ACID 500 MG
500 TABLET ORAL DAILY
Status: DISCONTINUED | OUTPATIENT
Start: 2023-11-29 | End: 2023-11-30 | Stop reason: HOSPADM

## 2023-11-29 RX ORDER — GABAPENTIN 300 MG/1
300 CAPSULE ORAL ONCE
Status: COMPLETED | OUTPATIENT
Start: 2023-11-29 | End: 2023-11-29

## 2023-11-29 RX ORDER — HYDROMORPHONE HCL IN WATER/PF 6 MG/30 ML
0.2 PATIENT CONTROLLED ANALGESIA SYRINGE INTRAVENOUS
Status: DISCONTINUED | OUTPATIENT
Start: 2023-11-29 | End: 2023-11-29 | Stop reason: HOSPADM

## 2023-11-29 RX ORDER — CEFAZOLIN SODIUM 2 G/50ML
2000 SOLUTION INTRAVENOUS EVERY 8 HOURS
Status: COMPLETED | OUTPATIENT
Start: 2023-11-29 | End: 2023-11-30

## 2023-11-29 RX ORDER — ROCURONIUM BROMIDE 10 MG/ML
INJECTION, SOLUTION INTRAVENOUS AS NEEDED
Status: DISCONTINUED | OUTPATIENT
Start: 2023-11-29 | End: 2023-11-29

## 2023-11-29 RX ORDER — GABAPENTIN 300 MG/1
600 CAPSULE ORAL
Status: DISCONTINUED | OUTPATIENT
Start: 2023-11-29 | End: 2023-11-30 | Stop reason: HOSPADM

## 2023-11-29 RX ORDER — ONDANSETRON 2 MG/ML
INJECTION INTRAMUSCULAR; INTRAVENOUS AS NEEDED
Status: DISCONTINUED | OUTPATIENT
Start: 2023-11-29 | End: 2023-11-29

## 2023-11-29 RX ORDER — AMLODIPINE BESYLATE 5 MG/1
5 TABLET ORAL DAILY
Status: DISCONTINUED | OUTPATIENT
Start: 2023-11-29 | End: 2023-11-30 | Stop reason: HOSPADM

## 2023-11-29 RX ORDER — OXYCODONE HYDROCHLORIDE 5 MG/1
5 TABLET ORAL EVERY 6 HOURS PRN
Status: DISCONTINUED | OUTPATIENT
Start: 2023-11-29 | End: 2023-11-29

## 2023-11-29 RX ORDER — PROMETHAZINE HYDROCHLORIDE 12.5 MG/1
12.5 TABLET ORAL EVERY 6 HOURS PRN
Qty: 12 TABLET | Refills: 0 | Status: SHIPPED | OUTPATIENT
Start: 2023-11-29

## 2023-11-29 RX ORDER — FOLIC ACID 1 MG/1
1 TABLET ORAL DAILY
Status: DISCONTINUED | OUTPATIENT
Start: 2023-11-29 | End: 2023-11-30 | Stop reason: HOSPADM

## 2023-11-29 RX ORDER — ONDANSETRON 2 MG/ML
4 INJECTION INTRAMUSCULAR; INTRAVENOUS EVERY 6 HOURS PRN
Status: DISCONTINUED | OUTPATIENT
Start: 2023-11-29 | End: 2023-11-30 | Stop reason: HOSPADM

## 2023-11-29 RX ORDER — PRAVASTATIN SODIUM 40 MG
40 TABLET ORAL
Status: DISCONTINUED | OUTPATIENT
Start: 2023-11-29 | End: 2023-11-30 | Stop reason: HOSPADM

## 2023-11-29 RX ORDER — SODIUM CHLORIDE, SODIUM LACTATE, POTASSIUM CHLORIDE, CALCIUM CHLORIDE 600; 310; 30; 20 MG/100ML; MG/100ML; MG/100ML; MG/100ML
100 INJECTION, SOLUTION INTRAVENOUS CONTINUOUS
Status: DISCONTINUED | OUTPATIENT
Start: 2023-11-29 | End: 2023-11-30 | Stop reason: HOSPADM

## 2023-11-29 RX ORDER — SODIUM CHLORIDE 9 MG/ML
75 INJECTION, SOLUTION INTRAVENOUS CONTINUOUS
Status: DISCONTINUED | OUTPATIENT
Start: 2023-11-29 | End: 2023-11-30 | Stop reason: HOSPADM

## 2023-11-29 RX ORDER — HALOPERIDOL 5 MG/1
10 TABLET ORAL 2 TIMES DAILY
Status: DISCONTINUED | OUTPATIENT
Start: 2023-11-29 | End: 2023-11-30 | Stop reason: HOSPADM

## 2023-11-29 RX ORDER — NALOXONE HYDROCHLORIDE 4 MG/.1ML
SPRAY NASAL
Qty: 1 EACH | Refills: 0 | Status: SHIPPED | OUTPATIENT
Start: 2023-11-29 | End: 2024-11-28

## 2023-11-29 RX ORDER — DEXAMETHASONE SODIUM PHOSPHATE 10 MG/ML
INJECTION, SOLUTION INTRAMUSCULAR; INTRAVENOUS AS NEEDED
Status: DISCONTINUED | OUTPATIENT
Start: 2023-11-29 | End: 2023-11-29

## 2023-11-29 RX ORDER — FENTANYL CITRATE 50 UG/ML
INJECTION, SOLUTION INTRAMUSCULAR; INTRAVENOUS AS NEEDED
Status: DISCONTINUED | OUTPATIENT
Start: 2023-11-29 | End: 2023-11-29

## 2023-11-29 RX ORDER — ONDANSETRON 2 MG/ML
4 INJECTION INTRAMUSCULAR; INTRAVENOUS ONCE AS NEEDED
Status: DISCONTINUED | OUTPATIENT
Start: 2023-11-29 | End: 2023-11-29 | Stop reason: HOSPADM

## 2023-11-29 RX ADMIN — LIDOCAINE HYDROCHLORIDE 50 MG: 10 INJECTION, SOLUTION EPIDURAL; INFILTRATION; INTRACAUDAL; PERINEURAL at 08:04

## 2023-11-29 RX ADMIN — ROCURONIUM BROMIDE 40 MG: 50 INJECTION, SOLUTION INTRAVENOUS at 08:04

## 2023-11-29 RX ADMIN — CHLORHEXIDINE GLUCONATE 0.12% ORAL RINSE 15 ML: 1.2 LIQUID ORAL at 05:46

## 2023-11-29 RX ADMIN — ACETAMINOPHEN 975 MG: 325 TABLET ORAL at 21:48

## 2023-11-29 RX ADMIN — PROPOFOL 200 MG: 10 INJECTION, EMULSION INTRAVENOUS at 08:04

## 2023-11-29 RX ADMIN — ROCURONIUM BROMIDE 20 MG: 50 INJECTION, SOLUTION INTRAVENOUS at 08:31

## 2023-11-29 RX ADMIN — PRAZOSIN HYDROCHLORIDE 10 MG: 5 CAPSULE ORAL at 21:49

## 2023-11-29 RX ADMIN — IRON SUCROSE 300 MG: 20 INJECTION, SOLUTION INTRAVENOUS at 14:59

## 2023-11-29 RX ADMIN — CEFAZOLIN SODIUM 2000 MG: 2 SOLUTION INTRAVENOUS at 08:10

## 2023-11-29 RX ADMIN — GABAPENTIN 300 MG: 300 CAPSULE ORAL at 14:01

## 2023-11-29 RX ADMIN — ROCURONIUM BROMIDE 20 MG: 50 INJECTION, SOLUTION INTRAVENOUS at 09:43

## 2023-11-29 RX ADMIN — DOCUSATE SODIUM 100 MG: 100 CAPSULE, LIQUID FILLED ORAL at 17:03

## 2023-11-29 RX ADMIN — MONTELUKAST 10 MG: 10 TABLET, FILM COATED ORAL at 21:49

## 2023-11-29 RX ADMIN — HYDRALAZINE HYDROCHLORIDE 10 MG: 20 INJECTION INTRAMUSCULAR; INTRAVENOUS at 10:41

## 2023-11-29 RX ADMIN — DOXEPIN HYDROCHLORIDE 100 MG: 100 CAPSULE ORAL at 21:49

## 2023-11-29 RX ADMIN — VORTIOXETINE 10 MG: 10 TABLET, FILM COATED ORAL at 21:48

## 2023-11-29 RX ADMIN — ACETAMINOPHEN 975 MG: 325 TABLET ORAL at 05:46

## 2023-11-29 RX ADMIN — PRAVASTATIN SODIUM 40 MG: 40 TABLET ORAL at 16:29

## 2023-11-29 RX ADMIN — HALOPERIDOL 10 MG: 5 TABLET ORAL at 21:51

## 2023-11-29 RX ADMIN — HYDROMORPHONE HYDROCHLORIDE 0.5 MG: 1 INJECTION, SOLUTION INTRAMUSCULAR; INTRAVENOUS; SUBCUTANEOUS at 08:43

## 2023-11-29 RX ADMIN — ACETAMINOPHEN 975 MG: 325 TABLET ORAL at 16:32

## 2023-11-29 RX ADMIN — FLUPHENAZINE HYDROCHLORIDE 5 MG: 2.5 TABLET, FILM COATED ORAL at 21:49

## 2023-11-29 RX ADMIN — FENTANYL CITRATE 25 MCG: 50 INJECTION, SOLUTION INTRAMUSCULAR; INTRAVENOUS at 11:48

## 2023-11-29 RX ADMIN — HYDROMORPHONE HYDROCHLORIDE 0.2 MG: 0.2 INJECTION, SOLUTION INTRAMUSCULAR; INTRAVENOUS; SUBCUTANEOUS at 12:03

## 2023-11-29 RX ADMIN — HYDROMORPHONE HYDROCHLORIDE 0.2 MG: 0.2 INJECTION, SOLUTION INTRAMUSCULAR; INTRAVENOUS; SUBCUTANEOUS at 11:58

## 2023-11-29 RX ADMIN — DEXMEDETOMIDINE HYDROCHLORIDE 0.3 MCG/KG/HR: 100 INJECTION, SOLUTION INTRAVENOUS at 08:16

## 2023-11-29 RX ADMIN — TRANEXAMIC ACID 1000 MG: 10 INJECTION, SOLUTION INTRAVENOUS at 08:16

## 2023-11-29 RX ADMIN — ONDANSETRON 4 MG: 2 INJECTION INTRAMUSCULAR; INTRAVENOUS at 10:16

## 2023-11-29 RX ADMIN — GABAPENTIN 600 MG: 300 CAPSULE ORAL at 21:49

## 2023-11-29 RX ADMIN — FENTANYL CITRATE 25 MCG: 50 INJECTION, SOLUTION INTRAMUSCULAR; INTRAVENOUS at 11:43

## 2023-11-29 RX ADMIN — ROCURONIUM BROMIDE 20 MG: 50 INJECTION, SOLUTION INTRAVENOUS at 09:19

## 2023-11-29 RX ADMIN — HALOPERIDOL 10 MG: 5 TABLET ORAL at 14:59

## 2023-11-29 RX ADMIN — SUGAMMADEX 200 MG: 100 INJECTION, SOLUTION INTRAVENOUS at 10:18

## 2023-11-29 RX ADMIN — HYDROMORPHONE HYDROCHLORIDE 0.5 MG: 1 INJECTION, SOLUTION INTRAMUSCULAR; INTRAVENOUS; SUBCUTANEOUS at 09:45

## 2023-11-29 RX ADMIN — ROCURONIUM BROMIDE 20 MG: 50 INJECTION, SOLUTION INTRAVENOUS at 08:46

## 2023-11-29 RX ADMIN — DIAZEPAM 5 MG: 5 TABLET ORAL at 16:29

## 2023-11-29 RX ADMIN — FOLIC ACID 1 MG: 1 TABLET ORAL at 14:02

## 2023-11-29 RX ADMIN — SENNOSIDES 8.6 MG: 8.6 TABLET, FILM COATED ORAL at 14:02

## 2023-11-29 RX ADMIN — MIDAZOLAM 2 MG: 1 INJECTION INTRAMUSCULAR; INTRAVENOUS at 07:50

## 2023-11-29 RX ADMIN — TIZANIDINE 4 MG: 4 TABLET ORAL at 16:29

## 2023-11-29 RX ADMIN — POTASSIUM CHLORIDE 10 MEQ: 750 TABLET, EXTENDED RELEASE ORAL at 17:03

## 2023-11-29 RX ADMIN — METOPROLOL TARTRATE 2.5 MG: 1 INJECTION, SOLUTION INTRAVENOUS at 09:56

## 2023-11-29 RX ADMIN — GABAPENTIN 300 MG: 300 CAPSULE ORAL at 05:46

## 2023-11-29 RX ADMIN — HYDROMORPHONE HYDROCHLORIDE 0.5 MG: 1 INJECTION, SOLUTION INTRAMUSCULAR; INTRAVENOUS; SUBCUTANEOUS at 08:48

## 2023-11-29 RX ADMIN — BENZTROPINE MESYLATE 1 MG: 1 TABLET ORAL at 21:48

## 2023-11-29 RX ADMIN — OXYCODONE HYDROCHLORIDE 5 MG: 5 TABLET ORAL at 14:02

## 2023-11-29 RX ADMIN — SODIUM CHLORIDE, SODIUM LACTATE, POTASSIUM CHLORIDE, AND CALCIUM CHLORIDE: .6; .31; .03; .02 INJECTION, SOLUTION INTRAVENOUS at 09:32

## 2023-11-29 RX ADMIN — SODIUM CHLORIDE, SODIUM LACTATE, POTASSIUM CHLORIDE, AND CALCIUM CHLORIDE 100 ML/HR: .6; .31; .03; .02 INJECTION, SOLUTION INTRAVENOUS at 11:44

## 2023-11-29 RX ADMIN — FENTANYL CITRATE 25 MCG: 50 INJECTION, SOLUTION INTRAMUSCULAR; INTRAVENOUS at 11:38

## 2023-11-29 RX ADMIN — ENOXAPARIN SODIUM 40 MG: 40 INJECTION SUBCUTANEOUS at 21:51

## 2023-11-29 RX ADMIN — FENTANYL CITRATE 25 MCG: 50 INJECTION, SOLUTION INTRAMUSCULAR; INTRAVENOUS at 11:53

## 2023-11-29 RX ADMIN — CEFAZOLIN SODIUM 2000 MG: 2 SOLUTION INTRAVENOUS at 18:04

## 2023-11-29 RX ADMIN — OXYCODONE HYDROCHLORIDE AND ACETAMINOPHEN 500 MG: 500 TABLET ORAL at 14:58

## 2023-11-29 RX ADMIN — FENTANYL CITRATE 50 MCG: 50 INJECTION, SOLUTION INTRAMUSCULAR; INTRAVENOUS at 10:31

## 2023-11-29 RX ADMIN — SODIUM CHLORIDE, SODIUM LACTATE, POTASSIUM CHLORIDE, AND CALCIUM CHLORIDE: .6; .31; .03; .02 INJECTION, SOLUTION INTRAVENOUS at 07:52

## 2023-11-29 RX ADMIN — DEXTROAMPHETAMINE SACCHARATE, AMPHETAMINE ASPARTATE, DEXTROAMPHETAMINE SULFATE AND AMPHETAMINE SULFATE 20 MG: 2.5; 2.5; 2.5; 2.5 TABLET ORAL at 14:00

## 2023-11-29 RX ADMIN — LORATADINE 10 MG: 10 TABLET ORAL at 21:49

## 2023-11-29 RX ADMIN — FENTANYL CITRATE 50 MCG: 50 INJECTION, SOLUTION INTRAMUSCULAR; INTRAVENOUS at 10:11

## 2023-11-29 RX ADMIN — METOPROLOL TARTRATE 2.5 MG: 1 INJECTION, SOLUTION INTRAVENOUS at 10:05

## 2023-11-29 RX ADMIN — SODIUM CHLORIDE, SODIUM LACTATE, POTASSIUM CHLORIDE, AND CALCIUM CHLORIDE 100 ML/HR: .6; .31; .03; .02 INJECTION, SOLUTION INTRAVENOUS at 14:05

## 2023-11-29 RX ADMIN — DEXAMETHASONE SODIUM PHOSPHATE 10 MG: 10 INJECTION INTRAMUSCULAR; INTRAVENOUS at 08:04

## 2023-11-29 RX ADMIN — FENTANYL CITRATE 100 MCG: 50 INJECTION, SOLUTION INTRAMUSCULAR; INTRAVENOUS at 08:04

## 2023-11-29 RX ADMIN — CELECOXIB 200 MG: 100 CAPSULE ORAL at 17:03

## 2023-11-29 NOTE — ANESTHESIA PREPROCEDURE EVALUATION
Procedure:  ARTHROPLASTY HIP TOTAL ANTERIOR (Right: Hip)    Relevant Problems   CARDIO   (+) Essential hypertension   (+) Mixed hyperlipidemia      ENDO   (+) Hypothyroidism      MUSCULOSKELETAL   (+) Chronic bilateral low back pain without sciatica   (+) DDD (degenerative disc disease), cervical   (+) Primary osteoarthritis of right hip   (+) Primary osteoarthritis of right knee   (+) Thoracic spondylosis      NEURO/PSYCH   (+) Chronic bilateral low back pain without sciatica   (+) Post-traumatic stress disorder, chronic      PULMONARY   (+) Asthma   (+) ERIC (obstructive sleep apnea)        Physical Exam    Airway       Dental       Cardiovascular      Pulmonary      Other Findings  post-pubertal.      Anesthesia Plan  ASA Score- 3     Anesthesia Type- general with ASA Monitors. Additional Monitors:     Airway Plan:            Plan Factors-Exercise tolerance (METS): <4 METS. Chart reviewed. Patient summary reviewed. Induction- intravenous. Postoperative Plan- Plan for postoperative opioid use. Planned trial extubation    Informed Consent- Anesthetic plan and risks discussed with patient. I personally reviewed this patient with the CRNA. Discussed and agreed on the Anesthesia Plan with the CRNA. Drake Lawler

## 2023-11-29 NOTE — INTERVAL H&P NOTE
H&P reviewed. After examining the patient I find no changes in the patients condition since the H&P had been written.   Heart:  regular rate  Lungs:  no audible wheezing  Abd:  nondistended  RLE:  EHL/AT/GS intact, sensation grossly intact L4, L5, S1, palpable pedal pulse      Vitals:    11/29/23 0545   BP: 162/91   Pulse: 83   Resp: 16   Temp: (!) 97.2 °F (36.2 °C)   SpO2: 94%

## 2023-11-29 NOTE — OP NOTE
OPERATIVE REPORT  PATIENT NAME: Denys Pennington  : 1975  MRN: [id number]  Pt Location:  [address] [place] [address] OR ROOM 12    Surgery Date: 2023    Surgeon(s) and Role:     * Jose Ramos, DO - Primary     * Altagracia Galindo PA-C - Assisting      * Joanne Oglesby, ATC - Assisting    Preop Diagnosis:  Primary osteoarthritis of one hip, right [M16.11]    Post-Op Diagnosis Codes:     * Primary osteoarthritis of one hip, right [M16.11]    Procedure(s):  Right - ARTHROPLASTY HIP TOTAL ANTERIOR    Specimens:  * No specimens in log *    Estimated Blood Loss:   100 mL    Drains:  * No LDAs found *    Anesthesia Type:   General     Operative Indications:  Primary osteoarthritis of one hip, right [M16.11]    Operative Findings:  See below    Complications:   None      Hip Approach: Direct anterior    Procedure and Technique:  Implants: Depuy  [place] acetabular sector II cup size 48mm  Actis high offset stem size 2  Neutral acetabular liner  two 6.5 x 15mm screw  32mm +1 Biolox delta ceramic femoral head    INDICATIONS FOR PROCEDURE:  The patients is a 46year old female who presented to the office with right hip arthritis. Conservative treatments were tried and failed. The patient had debilitating pain and decreased quality of life from their end-stage arthritis. Surgery was then recommended. Extensive counseling in regards to the reasons for surgical intervention as well as the risks and benefits of surgery were reviewed. The risks include, but are not limited to infection, extensive blood clots, blood clots, wound healing problems, damage to blood vessels and nerves, dislocation, leg length discrepancy, fracture, the need for further surgery. The patient understood and agreed to by oral and written consent.     OPERATIVE PROCEDURE:  The patient was identified as Denys Pennington by Her ID bracelet by the surgical staff in the preoperative area at [facility].  The patient was wheeled back to the surgical room and placed on the operative table. Preoperative antibiotics were given. Anesthesia was administered. The patient was placed in the supine position on the hana table and all bony prominences were carefully protected. The right leg was then prepped and draped in the usual sterile fashion. A timeout was performed where the patient’s name and surgical site were once again identified. An incision was made over  the anterior aspect of the patient’s right hip. Dissection was made through the skin and subcutaneous tissue down to the fascia over the tensor fascia cece. The fascia was incised and the interval between the tensor fascia cece and the sartorius was identified. Retractors were placed. Bleeders were cauterized. We dissected down to the capsule. A capsulotomy was made and the capsule was tagged with suture. We released the capsule from the neck. The femoral neck cut was made and the femoral head was removed. Retractors were placed around the acetabulum. The labrum was removed. Sequential reaming took place and a size 48mm acetabular shell was found to be the best fit. XRs were taken to evaluate the position of the cup. The shell was impacted into place. Two 6.5 x 15 mm screws were placed through the cup and the final liner was impacted into placed. The liner was checked and found to be secure. Attention was then paid to the femur. The leg was manipulated for exposure of the femur proximally. Soft tissue dissection was done to reveal the greater trochanter. A canal finder were used to open the femoral canal and excess bone was removed laterally. Sequential broaching took place and it was found that a size 2 femoral broach to be the best fit. The broach was left in place and a size 32mm +1 femoral head with a high offset neck were then trialed. XRs were taken. Computer navigation was used to evaluate leg lengths and offset.   The leg was taken through a ROM to evaluate for stability. Stability, leg length, and offset were found to be acceptable. The final femoral stem was impacted into place. The final 32mm +1 femoral head was impacted securely into place. The acetabulum was irrigated and the hip was carefully reduced. Copious amounts of irrigation was used to irrigate the hip. An irrisept wash followed by more irrigation was performed. The capsule was repaired with a #5 Ethibond suture. Irrigation was used throughout the closure. The tensor fascia was repaired with #1-0 running vicryl suture. The subcutaneous fat was closed with a running #1-0 vicryl suture. The skin was closed with #2-0 vicryl and #3-0 monocryl subcutaneously. Dermabond was placed on the incision and a mepilex dressing was placed. The patient was transferred onto a hospital bed and awakened without difficulty. I attest that I was present and performed this procedure. Leonardo Gold PA-C  and Tk Rojas ATC were present for the entire procedure and provided essential assistance with limb position, patient prepping, and retraction. A qualified resident physician was not available.     Patient Disposition:  hemodynamically stable              SIGNATURE: Chico Sanchez DO  DATE: November 29, 2023  TIME: 10:52 AM

## 2023-11-29 NOTE — PLAN OF CARE
Problem: PHYSICAL THERAPY ADULT  Goal: Performs mobility at highest level of function for planned discharge setting. See evaluation for individualized goals. Description: Treatment/Interventions: ADL retraining, Functional transfer training, LE strengthening/ROM, Elevations, Therapeutic exercise, Endurance training, Patient/family training, Equipment eval/education, Bed mobility, Gait training, Spoke to nursing, Spoke to case management, OT  Equipment Recommended: Jack Stoddard       See flowsheet documentation for full assessment, interventions and recommendations. Outcome: Progressing  Note: Prognosis: Good  Problem List: Decreased strength, Decreased range of motion, Decreased endurance, Impaired balance, Decreased mobility, Decreased coordination, Impaired sensation, Pain, Orthopedic restrictions     Barriers to Discharge: Inaccessible home environment, Decreased caregiver support     Rehab Resource Intensity Level, PT: III (Minimum Resource Intensity) (likely home PT however must make progress with mobility)    See flowsheet documentation for full assessment.
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
## 2023-11-29 NOTE — ANESTHESIA PROCEDURE NOTES
Anesthesia Notable Event    Date/Time: 11/29/2023 1:08 PM    Performed by: Shailesh Snell MD  Authorized by: Shailesh Snell MD

## 2023-11-29 NOTE — DISCHARGE INSTR - AVS FIRST PAGE
ANTERIOR TOTAL HIP REPLACEMENT DISCHARGE INSTRUCTIONS    Surgical Dressing: You may remove your dressing 7 days from the date of your surgery then change your dressing daily until drainage stops. Do not put any lotions or creams on your incision. If there are any signs of infection such as drainage persisting beyond a few days, unusual looking drainage (yellow, green), increased redness around the incision, or fever/chills let your doctor know. Medications:  Upon discharge you will be given a prescription for an anticoagulant (i.e. Ecotrin (Aspirin) and a narcotic pain reliever. Do not take any over the counter NSAIDs (i.e. Ibuprofen, Motrin, Advil, Naprosyn, Aleve) while on your anticoagulation medication. Narcotic pain relievers cannot be refilled over the phone. Please be mindful of the number of pills you have left so you can  a prescription for a refill if needed during office hours. You should only take the oxycodone for severe pain. You have several other medications for pain including tylenol, celebrex, gabapentin, and xanaflex which will help you limit oxycodone use. You should not take the oxycodone at the same time as xanaflex because both medications are sedating. You will also be provided a script for narcan nasal spray which is the reversal agent if you would have any issues with over sedation taking the oxycodone. You should also apply ice to the hip area for 20 minutes every hour to help with pain and swelling. You should not take your estrogen for one week after surgery. After that, you may resume your estrogen treatment as prescribed. Walking:  Use two crutches or a walker for EVERY step. Gradually increase your walking daily. You can progress to a cane as tolerated once advised by your physical therapist.      Sleeping Positions: You may not sleep on your stomach. Bathing:  No tub baths. Do not submerge your incision. You may shower.   You may sit on a shower chair or stand and shower briefly. Use your crutches/walker to get in and out of the shower. Sexual Relations:  Resume according to your comfort. Swimming:  No swimming or hot tubs until approved by your physician. Swimming will be allowed once your incision is well healed. Driving: You may ride as a passenger now. No driving until your follow-up appointment. To get into the car use the front passenger seat with the seat pushed back as far as possible. Scoot yourself back in the seat. Use your hands to assist your legs into the car. Physical therapy:  When you have finished with home visiting PT, you may begin outpatient PT. Call your surgeon’s office if you have not already received a prescription. A prescription can be faxed to the outpatient center of your choice. Special considerations: To minimize swelling, stiffness, and decrease pain use cold as needed, but not heat. Ice 20 minutes at a time with a cloth between your skin and the ice. Ice after walking, when you have pain, or after you have completed your exercises. Limit your sitting to 60 minutes at one time. Continue to wear your DIONY stockings at all times for 2 weeks after surgery, except when washing. You can wear them as needed after that. Follow up:  Call [phone] to make an appointment to see your surgeon within 2 weeks of your surgery if you haven’t done so already. If you have any questions during business hours please call the direct office phone number [phone]. Otherwise please call [phone] for any concerns. For the future:  Prior to any dental work, including routine cleanings, call the office for a prescription for antibiotics to be taken prior to your dental appointment. For any invasive procedures (i.e. endoscopy, colonoscopy, etc.) inform the doctor performing the procedure that you have a total knee replacement and will antibiotics just prior to the procedure to protect it.

## 2023-11-29 NOTE — ANESTHESIA POSTPROCEDURE EVALUATION
Post-Op Assessment Note    CV Status:  Stable  Pain Score: 0    Pain management: adequate       Mental Status:  Alert and awake   Hydration Status:  Euvolemic   PONV Controlled:  Controlled   Airway Patency:  Patent     Post Op Vitals Reviewed: Yes    No anethesia notable event occurred.     Staff: Anesthesiologist               /71 (11/29/23 1208)    Temp     Pulse 73 (11/29/23 1208)   Resp 12 (11/29/23 1208)    SpO2 (!) 89 % (11/29/23 1208)

## 2023-11-29 NOTE — ANESTHESIA POSTPROCEDURE EVALUATION
Post-Op Assessment Note    CV Status:  Stable    Pain management: adequate       Mental Status:  Alert and awake   Hydration Status:  Euvolemic   PONV Controlled:  Controlled   Airway Patency:  Patent     Post Op Vitals Reviewed: Yes      Staff: CRNA, Anesthesiologist               /63 (11/29/23 1055)    Temp (!) 97.4 °F (36.3 °C) (11/29/23 1055)    Pulse 97 (11/29/23 1055)   Resp 16 (11/29/23 1055)    SpO2 93 % (11/29/23 1055)

## 2023-11-29 NOTE — ANESTHESIA PROCEDURE NOTES
Anesthesia Notable Event    Date/Time: 11/29/2023 12:14 PM    Performed by: Anna Estes MD  Authorized by: Anna Estes MD

## 2023-11-29 NOTE — QUICK NOTE
I spoke to patient in preop holding regarding her postop pain regimen. I let her know that given her history it would be best to avoid narcotics. She stated that that is the only thing that helps. I let her know we could give her some but we would have to be careful with them and that she would have to get narcan along with them. She verbalized understanding and agreed to  narcan along with any narcotics that were given to her.   She states she has not abused narcotics in the past.

## 2023-11-30 VITALS
DIASTOLIC BLOOD PRESSURE: 71 MMHG | WEIGHT: 162 LBS | TEMPERATURE: 97.3 F | BODY MASS INDEX: 32.66 KG/M2 | OXYGEN SATURATION: 96 % | RESPIRATION RATE: 16 BRPM | SYSTOLIC BLOOD PRESSURE: 110 MMHG | HEIGHT: 59 IN | HEART RATE: 82 BPM

## 2023-11-30 LAB
ABO GROUP BLD BPU: NORMAL
ABO GROUP BLD BPU: NORMAL
ANION GAP SERPL CALCULATED.3IONS-SCNC: 7 MMOL/L
BPU ID: NORMAL
BPU ID: NORMAL
BUN SERPL-MCNC: 10 MG/DL (ref 5–25)
CALCIUM SERPL-MCNC: 8.5 MG/DL (ref 8.4–10.2)
CHLORIDE SERPL-SCNC: 106 MMOL/L (ref 96–108)
CO2 SERPL-SCNC: 24 MMOL/L (ref 21–32)
CREAT SERPL-MCNC: 0.48 MG/DL (ref 0.6–1.3)
CROSSMATCH: NORMAL
CROSSMATCH: NORMAL
ERYTHROCYTE [DISTWIDTH] IN BLOOD BY AUTOMATED COUNT: 11.8 % (ref 11.6–15.1)
GFR SERPL CREATININE-BSD FRML MDRD: 114 ML/MIN/1.73SQ M
GLUCOSE SERPL-MCNC: 106 MG/DL (ref 65–140)
HCT VFR BLD AUTO: 33.8 % (ref 34.8–46.1)
HGB BLD-MCNC: 11.2 G/DL (ref 11.5–15.4)
MCH RBC QN AUTO: 32.3 PG (ref 26.8–34.3)
MCHC RBC AUTO-ENTMCNC: 33.1 G/DL (ref 31.4–37.4)
MCV RBC AUTO: 97 FL (ref 82–98)
PLATELET # BLD AUTO: 234 THOUSANDS/UL (ref 149–390)
PMV BLD AUTO: 9.1 FL (ref 8.9–12.7)
POTASSIUM SERPL-SCNC: 3.2 MMOL/L (ref 3.5–5.3)
RBC # BLD AUTO: 3.47 MILLION/UL (ref 3.81–5.12)
SODIUM SERPL-SCNC: 137 MMOL/L (ref 135–147)
UNIT DISPENSE STATUS: NORMAL
UNIT DISPENSE STATUS: NORMAL
UNIT PRODUCT CODE: NORMAL
UNIT PRODUCT CODE: NORMAL
UNIT PRODUCT VOLUME: 300 ML
UNIT PRODUCT VOLUME: 350 ML
UNIT RH: NORMAL
UNIT RH: NORMAL
WBC # BLD AUTO: 8.09 THOUSAND/UL (ref 4.31–10.16)

## 2023-11-30 PROCEDURE — 97116 GAIT TRAINING THERAPY: CPT

## 2023-11-30 PROCEDURE — 97535 SELF CARE MNGMENT TRAINING: CPT

## 2023-11-30 PROCEDURE — 99024 POSTOP FOLLOW-UP VISIT: CPT | Performed by: PHYSICIAN ASSISTANT

## 2023-11-30 PROCEDURE — 80048 BASIC METABOLIC PNL TOTAL CA: CPT | Performed by: PHYSICIAN ASSISTANT

## 2023-11-30 PROCEDURE — 97530 THERAPEUTIC ACTIVITIES: CPT

## 2023-11-30 PROCEDURE — 86920 COMPATIBILITY TEST SPIN: CPT

## 2023-11-30 PROCEDURE — 85027 COMPLETE CBC AUTOMATED: CPT | Performed by: PHYSICIAN ASSISTANT

## 2023-11-30 PROCEDURE — 97167 OT EVAL HIGH COMPLEX 60 MIN: CPT

## 2023-11-30 RX ORDER — OXYCODONE HYDROCHLORIDE 10 MG/1
10 TABLET ORAL EVERY 6 HOURS PRN
Qty: 20 TABLET | Refills: 0 | Status: SHIPPED | OUTPATIENT
Start: 2023-11-30 | End: 2023-12-08 | Stop reason: SDUPTHER

## 2023-11-30 RX ADMIN — POTASSIUM CHLORIDE 10 MEQ: 750 TABLET, EXTENDED RELEASE ORAL at 11:06

## 2023-11-30 RX ADMIN — GABAPENTIN 300 MG: 300 CAPSULE ORAL at 09:00

## 2023-11-30 RX ADMIN — ACETAMINOPHEN 975 MG: 325 TABLET ORAL at 06:13

## 2023-11-30 RX ADMIN — ACETAMINOPHEN 975 MG: 325 TABLET ORAL at 14:34

## 2023-11-30 RX ADMIN — CEFAZOLIN SODIUM 2000 MG: 2 SOLUTION INTRAVENOUS at 01:10

## 2023-11-30 RX ADMIN — OXYCODONE HYDROCHLORIDE AND ACETAMINOPHEN 500 MG: 500 TABLET ORAL at 09:00

## 2023-11-30 RX ADMIN — ENOXAPARIN SODIUM 40 MG: 40 INJECTION SUBCUTANEOUS at 09:07

## 2023-11-30 RX ADMIN — SODIUM CHLORIDE, SODIUM LACTATE, POTASSIUM CHLORIDE, AND CALCIUM CHLORIDE 100 ML/HR: .6; .31; .03; .02 INJECTION, SOLUTION INTRAVENOUS at 02:15

## 2023-11-30 RX ADMIN — OXYCODONE HYDROCHLORIDE 10 MG: 10 TABLET ORAL at 12:26

## 2023-11-30 RX ADMIN — CEPHALEXIN 500 MG: 500 CAPSULE ORAL at 06:13

## 2023-11-30 RX ADMIN — DOCUSATE SODIUM 100 MG: 100 CAPSULE, LIQUID FILLED ORAL at 08:59

## 2023-11-30 RX ADMIN — IRON SUCROSE 300 MG: 20 INJECTION, SOLUTION INTRAVENOUS at 08:52

## 2023-11-30 RX ADMIN — AMLODIPINE BESYLATE 5 MG: 10 TABLET ORAL at 08:59

## 2023-11-30 RX ADMIN — TIZANIDINE 4 MG: 4 TABLET ORAL at 11:52

## 2023-11-30 RX ADMIN — CELECOXIB 200 MG: 100 CAPSULE ORAL at 08:59

## 2023-11-30 RX ADMIN — DEXTROAMPHETAMINE SACCHARATE, AMPHETAMINE ASPARTATE, DEXTROAMPHETAMINE SULFATE AND AMPHETAMINE SULFATE 20 MG: 2.5; 2.5; 2.5; 2.5 TABLET ORAL at 14:34

## 2023-11-30 RX ADMIN — DEXTROAMPHETAMINE SACCHARATE, AMPHETAMINE ASPARTATE, DEXTROAMPHETAMINE SULFATE AND AMPHETAMINE SULFATE 20 MG: 2.5; 2.5; 2.5; 2.5 TABLET ORAL at 08:59

## 2023-11-30 RX ADMIN — LIOTHYRONINE SODIUM 5 MCG: 5 TABLET ORAL at 11:05

## 2023-11-30 RX ADMIN — HALOPERIDOL 10 MG: 5 TABLET ORAL at 14:34

## 2023-11-30 RX ADMIN — FOLIC ACID 1 MG: 1 TABLET ORAL at 08:59

## 2023-11-30 RX ADMIN — CEPHALEXIN 500 MG: 500 CAPSULE ORAL at 14:34

## 2023-11-30 RX ADMIN — VORTIOXETINE 10 MG: 10 TABLET, FILM COATED ORAL at 11:05

## 2023-11-30 RX ADMIN — SENNOSIDES 8.6 MG: 8.6 TABLET, FILM COATED ORAL at 09:00

## 2023-11-30 RX ADMIN — OXYCODONE HYDROCHLORIDE 10 MG: 10 TABLET ORAL at 06:13

## 2023-11-30 NOTE — PROGRESS NOTES
Progress Note - Orthopedics   Ilia Kearns 46 y.o. female MRN: [id number]  Unit/Bed#: 7T [facility] 709-01 Encounter: [id number]    Assessment:  50yo Female POD1 Right hip s/p total hip arthroplasty, anterior approach with      Plan:  -WBAT RLE  -PT/OT, anterior approach precautions. Patient is having difficulty with quad activation this morning. Encouraged positive reinforcement that this is common after total hip arthroplasty. Instructed to continue to work on quad activation while in bed.  -Pain control PRN. Continue multimodal pain regiment . Currently the patient is receiving Oxycodone 10mg every 6hr as needed for pain. Instructed this morning that we will keep her on the Oxy 10 mg every 6 hours as needed for pain but I do anticipate that we will be able to discharge her on oxycodone 5 mg every 6 hours as she becomes more active with PT. Given her prior history of overdose we are being conservative with narcotic use.   -Labs reviewed this morning, WBC 8.09, Hgb 11.2, We will monitor and administer IVF/prbc as indicated. -24 hr of post-op Ancef completed, continue Keflex 500mg TID for 10 days post-op   -Continue DVT ppx of TEDs, SCDs, and Lovenox while in house, she will be transitioned to ASA 325mg BID x 6 weeks on discharge  -DC planning, pending PT clearance       Subjective: 57-year-old female postop day 1 status post right hip total hip arthroplasty, anterior approach with Dr. Kathy Cerna. Patient was seen and examined at bedside. Patient is overall feeling well. Patient denies any fevers chills or sweats. Patient does state that she has pain but her pain is under control at this time. Patient initially was receiving oxycodone 5 mg but she states that that did not provide her with adequate pain control and she was up to oxycodone 10 mg. This morning she states that her pain is under control.   Patient does report having a numbness and tingling sensation about her hip but denies it radiating down the leg. Patient's pain is primarily about the hip but does radiate down the leg into the toes occasionally. Patient was tearful this morning saying that she is discouraged that she is having difficulty with quad activation and with her leg being able to support her when she walks. Patient states that with physical therapy yesterday her right leg would not tolerate loading her up and states that the leg was slipping out from underneath her. She does report that she continues to work on trying to activate her quad though it is discouraging. Encouraged positive reinforcement that this is common after surgery to keep working towards this. Vitals: Blood pressure (!) 173/86, pulse 88, temperature (!) 97.2 °F (36.2 °C), temperature source Temporal, resp. rate 18, height 4' 11" (1.499 m), weight 73.5 kg (162 lb), SpO2 92 %. ,Body mass index is 32.72 kg/m². Intake/Output Summary (Last 24 hours) at 11/30/2023 4948  Last data filed at 11/30/2023 0601  Gross per 24 hour   Intake 2120 ml   Output 1950 ml   Net 170 ml       Invasive Devices       Peripheral Intravenous Line  Duration             Peripheral IV 11/29/23 Right Antecubital 1 day                    Physical Exam: General appearance: alert and oriented, in no acute distress  Head: Normocephalic, without obvious abnormality, atraumatic  Eyes: conjunctivae/corneas clear. PERRL, EOM's intact. Lungs: No stridor or wheezing  Heart: No apparent distress    Ortho Exam:   Right Lower Extremity Exam  Alignment:  Leg lengths appear equal.  Inspection:   Mepilex dressing is clean dry and intact on the anterior aspect of the right hip. No erythema or ecchymosis visualized about the hip. Palpation: Compartments are soft and compressible  Strength:  Anterior tibialis 5/5. Gastroc/Soleus 5/5. EHL 5/5. FHL 5/5. Neurovascular:  Sensation intact in DP/SP/Bey/Sa/T nerve distributions. Brisk capillary refill in all toes. Gait:  Not tested.      Lab, Imaging and other studies: I have personally reviewed pertinent lab results.   CBC:   Lab Results   Component Value Date    WBC 8.09 11/30/2023    HGB 11.2 (L) 11/30/2023    HCT 33.8 (L) 11/30/2023    MCV 97 11/30/2023     11/30/2023    RBC 3.47 (L) 11/30/2023    MCH 32.3 11/30/2023    MCHC 33.1 11/30/2023    RDW 11.8 11/30/2023    MPV 9.1 11/30/2023     CMP:   Lab Results   Component Value Date    SODIUM 137 11/30/2023     11/30/2023    CO2 24 11/30/2023    BUN 10 11/30/2023    CREATININE 0.48 (L) 11/30/2023    CALCIUM 8.5 11/30/2023    EGFR 114 11/30/2023      [place], [place]
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
## 2023-11-30 NOTE — PLAN OF CARE
Problem: PHYSICAL THERAPY ADULT  Goal: Performs mobility at highest level of function for planned discharge setting. See evaluation for individualized goals. Description: Treatment/Interventions: ADL retraining, Functional transfer training, LE strengthening/ROM, Elevations, Therapeutic exercise, Endurance training, Patient/family training, Equipment eval/education, Bed mobility, Gait training, Spoke to nursing, Spoke to case management, OT  Equipment Recommended: Jacquie Garcia       See flowsheet documentation for full assessment, interventions and recommendations. Outcome: Progressing  Note: Prognosis: Good  Problem List: Decreased strength, Decreased range of motion, Decreased endurance, Impaired balance, Decreased mobility, Decreased coordination, Impaired sensation, Pain, Orthopedic restrictions  Assessment: Pt is demonstrating significant improvement with mobilty and activity tolerance thIs AM, she is able to complete multiple ambulation trials of increasing distances with improved weight bearing to the RLE and no knee buckling. She appears to have improved pain control vs IE. Goal is to attempt stiars this afternoon with potential for pt to be cleared for D/C home today pending performance. Barriers to Discharge: Inaccessible home environment, Decreased caregiver support     Rehab Resource Intensity Level, PT: III (Minimum Resource Intensity)    See flowsheet documentation for full assessment.

## 2023-11-30 NOTE — NURSING NOTE
IV removed with tip intact. Pressure held to control bleeding. Discharge instructions discussed with TC and she verbalizes understanding. She left with all their belongings and discharge instructions.

## 2023-11-30 NOTE — PLAN OF CARE
Problem: PAIN - ADULT  Goal: Verbalizes/displays adequate comfort level or baseline comfort level  Description: Interventions:  - Encourage patient to monitor pain and request assistance  - Assess pain using appropriate pain scale  - Administer analgesics based on type and severity of pain and evaluate response  - Implement non-pharmacological measures as appropriate and evaluate response  - Consider cultural and social influences on pain and pain management  - Notify physician/advanced practitioner if interventions unsuccessful or patient reports new pain  Outcome: Progressing     Problem: SAFETY ADULT  Goal: Maintain or return to baseline ADL function  Description: INTERVENTIONS:  -  Assess patient's ability to carry out ADLs; assess patient's baseline for ADL function and identify physical deficits which impact ability to perform ADLs (bathing, care of mouth/teeth, toileting, grooming, dressing, etc.)  - Assess/evaluate cause of self-care deficits   - Assess range of motion  - Assess patient's mobility; develop plan if impaired  - Assess patient's need for assistive devices and provide as appropriate  - Encourage maximum independence but intervene and supervise when necessary  - Involve family in performance of ADLs  - Assess for home care needs following discharge   - Consider OT consult to assist with ADL evaluation and planning for discharge  - Provide patient education as appropriate  Outcome: Progressing     Problem: SKIN/TISSUE INTEGRITY - ADULT  Goal: Incision(s), wounds(s) or drain site(s) healing without S/S of infection  Description: INTERVENTIONS  - Assess and document dressing, incision, wound bed, drain sites and surrounding tissue  - Provide patient and family education  - Perform skin care/dressing changes every   Outcome: Progressing
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
## 2023-12-01 ENCOUNTER — TELEPHONE (OUTPATIENT)
Age: 48
End: 2023-12-01

## 2023-12-01 ENCOUNTER — APPOINTMENT (OUTPATIENT)
Dept: RADIOLOGY | Facility: MEDICAL CENTER | Age: 48
End: 2023-12-01
Payer: MEDICARE

## 2023-12-01 ENCOUNTER — OFFICE VISIT (OUTPATIENT)
Dept: OBGYN CLINIC | Facility: MEDICAL CENTER | Age: 48
End: 2023-12-01

## 2023-12-01 VITALS
WEIGHT: 162 LBS | SYSTOLIC BLOOD PRESSURE: 108 MMHG | BODY MASS INDEX: 32.66 KG/M2 | HEART RATE: 101 BPM | DIASTOLIC BLOOD PRESSURE: 74 MMHG | HEIGHT: 59 IN

## 2023-12-01 DIAGNOSIS — Z96.651 STATUS POST REVISION OF TOTAL KNEE REPLACEMENT, RIGHT: ICD-10-CM

## 2023-12-01 DIAGNOSIS — Z96.651 STATUS POST REVISION OF TOTAL KNEE REPLACEMENT, RIGHT: Primary | ICD-10-CM

## 2023-12-01 PROCEDURE — 73502 X-RAY EXAM HIP UNI 2-3 VIEWS: CPT

## 2023-12-01 PROCEDURE — 99024 POSTOP FOLLOW-UP VISIT: CPT | Performed by: ORTHOPAEDIC SURGERY

## 2023-12-01 NOTE — TELEPHONE ENCOUNTER
Stevan     BRIDGER 11/29 discharge to her home and fell. Dickinson unsteady prior. States her knee went 90 degrees out. Transferred to Nurse jazzy.     Callback: [phone]
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
## 2023-12-04 DIAGNOSIS — G89.29 CHRONIC PAIN OF RIGHT KNEE: Primary | ICD-10-CM

## 2023-12-04 DIAGNOSIS — M25.561 CHRONIC PAIN OF RIGHT KNEE: Primary | ICD-10-CM

## 2023-12-04 DIAGNOSIS — M25.361 KNEE INSTABILITY, RIGHT: ICD-10-CM

## 2023-12-04 NOTE — TELEPHONE ENCOUNTER
Caller: Lizeth Wyatt nurse at [facility]    Doctor: Julio Orellana    Reason for call:     Shruthiamparo Perez is calling to speak to the nurse, the patient has fallen 3 times and would like to   Speak to the nurse about this patient. She is asking for a call back to discuss patient. She is asking if there is a knee brace to help this patient support her better so she does not keep fallen. ..     Call back#: [phone]

## 2023-12-04 NOTE — TELEPHONE ENCOUNTER
Zana Vargas, thanks for speaking with patient since we are in OR today. We can get patient a TROM brace if she would like for more support. I am not sure how she would get this brace without coming to the office. Do you know if home health PT is able to help get this to her?

## 2023-12-05 ENCOUNTER — PATIENT OUTREACH (OUTPATIENT)
Dept: OBGYN CLINIC | Facility: HOSPITAL | Age: 48
End: 2023-12-05

## 2023-12-05 ENCOUNTER — TELEPHONE (OUTPATIENT)
Dept: OBGYN CLINIC | Facility: HOSPITAL | Age: 48
End: 2023-12-05

## 2023-12-05 NOTE — TELEPHONE ENCOUNTER
Caller: patient    Doctor: Isidra Kinsey    Reason for call: Since patient saw the physician on 12/1/2023. Patient has fallen 4 times since then, states she fell again this morning, She is having numbness, tingling, severe pain, and states her knee keeps giving out on her. States her leg is not moving like it should, she has to assist her leg.       Transferred to Nurse Navigator- Mayela Walker RN

## 2023-12-05 NOTE — TELEPHONE ENCOUNTER
Spoke with Monica Taylor, who has been TC therapist through her last previous ortho surgeries. Patient having poor quad activation, resulting in unstable knee. Trying some quad taping. PT unable to get brace prior to Friday, so we will plan to fit her brace during her clinic visit. PT to see patient again on Thursday. No other questions at this time.

## 2023-12-05 NOTE — PROGRESS NOTES
REBECCA reviewed pt chart and pt had arthroplasty of right hip completed on 11/29/2023. Pt was DC to home as planned with in home PT, OT, and VNA. I contacted pt today for follow up today, pt is receiving PT 3 x a week. Pt had second visit today and OT is coming for an evaluation this afternoon. Pt is unsure how many times OT will be coming per week. Pt is expressing concerns today that she has fallen six times since she has DC to home. Pt had surgery on 11/29 and her caregiver support person had to leave to go home on Sunday 12/03. Pt is saying that her thigh and calf feel numb and uncomfortable. Pt has contacted our office and a brace was recommended by PT to use to help her. NN also contacted pt today and spoke ot pt about her At this time pt has no friends or family that are able to come and assist her. Per pt her PCP doctor did complete paperwork to try and obtain HHA services. REBECCA suggested pt follow up with her insurance. Pt expressed understanding. REBECCA had previously referred pt to [facility] and I was advised pt would not qualify for services. REBECCA did confirm today, self pay [facility] services are not an option. Pt shared today that with her MH, she gets emotional because she worries that she will never feel better. REBECCA provided supportive listening. Due to pt concerns, MELINDA will not close referral today and continue to support pt.

## 2023-12-05 NOTE — TELEPHONE ENCOUNTER
Spoke with patient at length. We reviewed that therapist is unable to get TROM brace sooner so we will fit her for that on Friday. She does not have any support at home to get it sooner. Until then, I advised the patient to keep her long distance ambulation at a minimum. I suggested that she get set up in one location with bathroom near by and walker at all times. She should avoid extra walking (going to get mail, extra trips outside for the dogs, etc) for now. She was encouraged to continue her PT exercises to work on her quad activation. She will give the quad taping a try. Patient tearful and anxious about complications. I encouraged her that we will continue to monitor her closely and support her. She will return to clinic Friday.
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
## 2023-12-08 ENCOUNTER — OFFICE VISIT (OUTPATIENT)
Dept: OBGYN CLINIC | Facility: MEDICAL CENTER | Age: 48
End: 2023-12-08

## 2023-12-08 ENCOUNTER — TELEPHONE (OUTPATIENT)
Age: 48
End: 2023-12-08

## 2023-12-08 ENCOUNTER — APPOINTMENT (OUTPATIENT)
Dept: RADIOLOGY | Facility: MEDICAL CENTER | Age: 48
End: 2023-12-08
Payer: MEDICARE

## 2023-12-08 VITALS
HEART RATE: 101 BPM | WEIGHT: 162.8 LBS | BODY MASS INDEX: 32.82 KG/M2 | HEIGHT: 59 IN | DIASTOLIC BLOOD PRESSURE: 82 MMHG | SYSTOLIC BLOOD PRESSURE: 132 MMHG

## 2023-12-08 DIAGNOSIS — M25.561 RIGHT KNEE PAIN, UNSPECIFIED CHRONICITY: Primary | ICD-10-CM

## 2023-12-08 DIAGNOSIS — M25.561 RIGHT KNEE PAIN, UNSPECIFIED CHRONICITY: ICD-10-CM

## 2023-12-08 DIAGNOSIS — Z96.641 STATUS POST TOTAL HIP REPLACEMENT, RIGHT: ICD-10-CM

## 2023-12-08 PROCEDURE — 73564 X-RAY EXAM KNEE 4 OR MORE: CPT

## 2023-12-08 PROCEDURE — 99024 POSTOP FOLLOW-UP VISIT: CPT | Performed by: ORTHOPAEDIC SURGERY

## 2023-12-08 RX ORDER — OXYCODONE HYDROCHLORIDE 10 MG/1
10 TABLET ORAL EVERY 6 HOURS PRN
Qty: 20 TABLET | Refills: 0 | Status: SHIPPED | OUTPATIENT
Start: 2023-12-08 | End: 2023-12-18
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
## 2023-12-11 NOTE — TELEPHONE ENCOUNTER
APPROVAL IN MEDIA    APPROVAL FAXED TO PHARMACY    PT MADE AWARE WITH QUIANA JOHNSON COMPLETED AND CLOSED

## 2023-12-12 ENCOUNTER — PATIENT OUTREACH (OUTPATIENT)
Dept: PEDIATRICS CLINIC | Facility: CLINIC | Age: 48
End: 2023-12-12

## 2023-12-12 NOTE — PROGRESS NOTES
SWCM contacted pt today for follow up. Per pt she is still in a lot of pain. Pt has been taking the oxycodone, per pt it does take the edge off, but she is afraid to use it as much as needed because she is concerned that the medication will not be refilled. Per review of chart, the oxycodone 10 mg was approved by her insurance 12/08 - 12/15. Pt is unsure if this means her insurance will pay for the medication or if she will receive refills. Per pt she has paid for the medication the first two times, it was $20.00 which can be difficult for a person on disability with a limited income. [facility] will clarify what the message means and follow up with pt. Pt also shared she is in the brace and his twisted her knee. She is scheduled for an MRI on Saturday 12/ 16. SWCM inquired if pt has a ride to MRI and her friend is taking her. SWCM inquired if pt has interest in Meals on Wheels and she declined. Per pt her friend has been bringing her meals. [facility] will continue to support pt and remain available.

## 2023-12-16 ENCOUNTER — HOSPITAL ENCOUNTER (OUTPATIENT)
Dept: MRI IMAGING | Facility: HOSPITAL | Age: 48
Discharge: HOME | End: 2023-12-16
Payer: MEDICARE

## 2023-12-16 DIAGNOSIS — M25.561 RIGHT KNEE PAIN, UNSPECIFIED CHRONICITY: ICD-10-CM

## 2023-12-16 PROCEDURE — 73721 MRI JNT OF LWR EXTRE W/O DYE: CPT

## 2023-12-16 PROCEDURE — G1004 CDSM NDSC: HCPCS

## 2023-12-18 ENCOUNTER — TELEPHONE (OUTPATIENT)
Age: 48
End: 2023-12-18

## 2023-12-18 DIAGNOSIS — Z96.641 STATUS POST TOTAL HIP REPLACEMENT, RIGHT: ICD-10-CM

## 2023-12-18 NOTE — TELEPHONE ENCOUNTER
Reason for call:   [x] Refill   [] Prior Auth  [] Other:     Office:   [] PCP/Provider -   [x] Specialty/Provider - Ortho    Medication:   Oxycodone 10mg- take 1 tablet by mouth every 6 hours as needed for severe pain    Pharmacy: [facility]    Does the patient have enough for 3 days?   [] Yes   [x] No - Send as HP to POD

## 2023-12-18 NOTE — TELEPHONE ENCOUNTER
Ángel Rene, can you please call patient and let her know for the TROM brace she can try wrapping with an ACE or stocking under to help prevent skin irritation. She could also padding any area rubbing her skin with gauze or ABD pads. That may cause the brace to slide down her leg so she will have to see what she can do.     As far as the oxycodone refill, I would like to wean patient down to oxycodone 10 mg every 8 hours prn. I would provide her 20 tablets. Can you please discuss this with her and let me know if she is agreeable. I did not wean her last script down so we should start to taper off the oxycodone. Thanks!

## 2023-12-18 NOTE — TELEPHONE ENCOUNTER
Ángel Acosta, can you see my note to Anju? I did not sign meds yet because I was hoping someone could speak with patient first.

## 2023-12-18 NOTE — TELEPHONE ENCOUNTER
Regarding: thigh pain from brace  ----- Message from Vanessa Orteag sent at 12/18/2023  1:17 PM EST -----  Patient called refill line to get refill but patient also stated that the brace she wears has straps that are causing a lot of pain in her thigh area. Patient was wondering if theres anything she can do to help it. Patient would like a call with advice.

## 2023-12-19 RX ORDER — OXYCODONE HYDROCHLORIDE 10 MG/1
10 TABLET ORAL EVERY 8 HOURS PRN
Qty: 20 TABLET | Refills: 0 | Status: SHIPPED | OUTPATIENT
Start: 2023-12-19 | End: 2023-12-27 | Stop reason: SDUPTHER

## 2023-12-19 NOTE — TELEPHONE ENCOUNTER
Caller: Patient     Doctor: Stevan    Reason for call: Patient following up on med refill. She is agreeable to wean off the Oxycodone. She is taking 10mg BID now. [facility].    Call back#: [phone]

## 2023-12-19 NOTE — TELEPHONE ENCOUNTER
Thanks for reviewing with patient. Refill provided for oxycodone 10 mg TID prn severe pain x 20 tablets. We will continue to wean. Thanks!

## 2023-12-21 ENCOUNTER — TELEPHONE (OUTPATIENT)
Dept: OBGYN CLINIC | Facility: CLINIC | Age: 48
End: 2023-12-21

## 2023-12-21 ENCOUNTER — TELEPHONE (OUTPATIENT)
Age: 48
End: 2023-12-21

## 2023-12-21 DIAGNOSIS — Z96.641 STATUS POST TOTAL HIP REPLACEMENT, RIGHT: ICD-10-CM

## 2023-12-21 DIAGNOSIS — S76.111A STRAIN OF RIGHT QUADRICEPS, INITIAL ENCOUNTER: Primary | ICD-10-CM

## 2023-12-21 NOTE — TELEPHONE ENCOUNTER
Caller: MARY    Doctor: Washington Mccrary     Reason for call: Was not able to get an MRI appt until Giovani 3 and a f/up with you on 1/12, They said the MRI was not STAT so they are unable to get her in sooner. She wanted to know if this was going to be alright     Call back#: [phone]

## 2023-12-21 NOTE — TELEPHONE ENCOUNTER
Spoke with patient. Briefly reviewed MRI right knee. Patient has irregularity in more proximal quad which was not fully evaluated on MRI knee. Does not appear that quad is torn, but more info is needed. Order placed for MRI right femur with MARS protocol to evaluate remainder of quad muscle. Patient reports continued pain and weakness in the thigh. Notes her knee is unstable. She denies any falls since wearing the TROM brace, but notes brace is uncomfortable. Patient was instructed on how to lock the brace in extension as she continues to be concerned about instability. Patient advised to pad the brace to help with pain. She may remove brace while seated and only wear while ambulating. We discussed her pain regimen. She continues to wean from oxycodone and we will plan to do oxy 10 mg BID prn pain at next refill. We discussed if patient would like to come in to the office tomorrow as scheduled or if she would like to come in after MRI femur is completed. Patient will cancel her appt tomorrow and will follow up after MRI right femur is completed.

## 2023-12-21 NOTE — TELEPHONE ENCOUNTER
Called and spoke w/pt and she states she was just letting Dr Calles and Jojo know.  No further questions/concerns.

## 2023-12-21 NOTE — TELEPHONE ENCOUNTER
That is fine, likely no appts available because of the holiday. I did mention to patient that was a possibility.

## 2023-12-22 ENCOUNTER — PATIENT OUTREACH (OUTPATIENT)
Dept: OBGYN CLINIC | Facility: HOSPITAL | Age: 48
End: 2023-12-22

## 2023-12-22 NOTE — PROGRESS NOTES
SWCM contacted pt today to follow up per our last conversation on 12/12/2023. Per pt today her leg is only working 50% and she is going to have another MRI on 01/10/2024. Pt is receiving 3 x a week in home. Pt did confirm per our last conversation, her insurance is now covering her oxycodone 10 mg. ( She no longer has to pay the $20.00 for the medication). Pt is weaning from oxycodone. She was prescribed a refill and pt takes 10 mg BID prn at this time. Pts friend is coming to day to take her grocery shopping so she can get food in the house. No other needs noted at this time. SWCM will follow up after the holidays.

## 2023-12-27 ENCOUNTER — TELEPHONE (OUTPATIENT)
Age: 48
End: 2023-12-27

## 2023-12-27 DIAGNOSIS — Z96.641 STATUS POST TOTAL HIP REPLACEMENT, RIGHT: ICD-10-CM

## 2023-12-27 RX ORDER — ASPIRIN 325 MG
325 TABLET, DELAYED RELEASE (ENTERIC COATED) ORAL 2 TIMES DAILY WITH MEALS
Qty: 84 TABLET | Refills: 0 | Status: SHIPPED | OUTPATIENT
Start: 2023-12-27

## 2023-12-27 RX ORDER — OXYCODONE HYDROCHLORIDE 10 MG/1
10 TABLET ORAL EVERY 12 HOURS PRN
Qty: 14 TABLET | Refills: 0 | Status: SHIPPED | OUTPATIENT
Start: 2023-12-27 | End: 2024-01-06

## 2023-12-27 RX ORDER — OXYCODONE HYDROCHLORIDE 10 MG/1
10 TABLET ORAL EVERY 8 HOURS PRN
Qty: 20 TABLET | Refills: 0 | Status: CANCELLED | OUTPATIENT
Start: 2023-12-27 | End: 2024-01-06

## 2023-12-27 NOTE — TELEPHONE ENCOUNTER
"Spoke to patient.     In home PT is at the home now. I originally recommended patient seek medical attention due to fall and head strike. She states this occurred on 12/24, \"glo benjamin\" and has been fine since fall.     She denies concerns, but wanted to make surgeon aware of instability.     pt encouraged to call me with questions, concerns or issues.    "

## 2023-12-27 NOTE — TELEPHONE ENCOUNTER
Please call patient and let her know oxycodone 10 mg BID prn pain was sent to pharmacy. I did not receive a request prior to this, so I do not know who denied her previus request. I also did not send a refill for ASA and she does not need a refill beyond her 6 week script that I sent initially.

## 2023-12-27 NOTE — TELEPHONE ENCOUNTER
Caller:  MRI dept     Doctor: Stevan     Reason for call: Asked about MRI and would like to clarify a few things     Call back#: [phone] opt 0

## 2023-12-27 NOTE — TELEPHONE ENCOUNTER
Kirstin, agree with recs. If patient having any symptoms from fall (headache, dizziness, etc) she should go to the ED for eval. Unfortunately I just spoke with MRI department at [facility] and her MRI cannot be completed there since they do not have the metal artifact reduction sequencing. Patient needs to be rescheduled for Jose or [facility]. I did ask MRI department to reach out to patient to reschedule. If you speak with patient today, can you please confirm that she heard from MRI and got a new appt? If she has any issues I can reach out to MRI myself. Thanks

## 2023-12-27 NOTE — TELEPHONE ENCOUNTER
Reason for call:   [x] Refill   [] Prior Auth  [] Other:     Office:   [] PCP/Provider -   [x] Specialty/Provider - Ortho     Medication: oxyCODONE (ROXICODONE)     Dose/Frequency:     10 mg, Oral, Every 8 hours PRN       Quantity: 20    Pharmacy: [facility] - [facility] - [address]     Does the patient have enough for 3 days?   [] Yes   [x] No - Send as HP to POD

## 2023-12-27 NOTE — TELEPHONE ENCOUNTER
Spoke with MRI department at [facility]. They do not have MARS protocol and patient will have to be rescheduled to Jose or [facility]. I asked that  MRI department reach out to patient to have her appropriately scheduled. No other questions at this time.

## 2023-12-27 NOTE — TELEPHONE ENCOUNTER
Reason for call:   [] Refill   [] Prior Auth  [x] Other:   Pt called stated her aspirin was sent to the pharm but the oxycodone was denied and she cannot understand why because she was told that the oxycodone would be tapered down not refused, she stated she also fell. She has 3 pills left of the oxycodone but says it it would soon finish.  Pt is upset and crying bc she does not want to be in pain she says    Pt states she can be contacted [phone]    Please review and order/contact pt accordingly

## 2023-12-27 NOTE — TELEPHONE ENCOUNTER
Caller: Patient    Doctor: Pilar    Reason for call: Patient stated she was changing her clothes and right knee gave out making the patient fall on her back and hit her head.      Patient wanted to make Dr. Calles aware of instability prior to 1/12 FU appt following 11/29 total rt hip sx.    Patient scheduled for an MRI 1/3.    Call back#: [phone]

## 2023-12-28 NOTE — TELEPHONE ENCOUNTER
Caller:  Patient     Doctor: Stevan / Demetra Salas     Reason for call: Patient called in tears for status of her medication refill.     Patient informed it was sent in yesterday .     She will call pharmacy and have them deliver it.  She states she is very overwhelmed with the pain and MRI being pushed back.    No further questions at this  time.       Call back#: [phone]

## 2023-12-28 NOTE — TELEPHONE ENCOUNTER
Spoke with patient. Reviewed pain regimen. She notes mild improvement in hip pain. Continues to have right knee pain, instability, and quad weakness. I spoke with central scheduling on behalf of patient. They have MRI appts available for Sat 1/6 at 7:15 and Sun 1/7 at 5. Patient encouraged to call [employer] and see if she can get a ride for either of those appointments. She may keep her FU appt with Stevan for 1/10 if she wants to have her brace adjusted in the office. She may also just follow up once MRI femur is complete. No other questions at this time.

## 2024-01-04 ENCOUNTER — PATIENT OUTREACH (OUTPATIENT)
Dept: OBGYN CLINIC | Facility: HOSPITAL | Age: 49
End: 2024-01-04

## 2024-01-04 DIAGNOSIS — Z01.818 PRE-OP EXAMINATION: ICD-10-CM

## 2024-01-04 DIAGNOSIS — Z01.818 PREOPERATIVE TESTING: ICD-10-CM

## 2024-01-04 DIAGNOSIS — M16.11 PRIMARY OSTEOARTHRITIS OF ONE HIP, RIGHT: ICD-10-CM

## 2024-01-04 RX ORDER — MULTIVITAMIN
1 TABLET ORAL DAILY
Qty: 30 TABLET | Refills: 0 | Status: SHIPPED | OUTPATIENT
Start: 2024-01-04

## 2024-01-04 RX ORDER — FOLIC ACID 1 MG/1
1000 TABLET ORAL DAILY
Qty: 30 TABLET | Refills: 0 | Status: SHIPPED | OUTPATIENT
Start: 2024-01-04

## 2024-01-04 NOTE — PROGRESS NOTES
MELINDACM contacted today for follow up. Per pt she is still having some difficulty and is going to have a second MRI. Pt has [employer] services available to her and her friends provide transportation for her sometimes. There are no social work needs at this time. SWCM will close referral and remain available as needed.

## 2024-01-05 ENCOUNTER — HOSPITAL ENCOUNTER (OUTPATIENT)
Dept: RADIOLOGY | Facility: HOSPITAL | Age: 49
Discharge: HOME | End: 2024-01-05
Payer: MEDICARE

## 2024-01-05 DIAGNOSIS — S76.111A STRAIN OF RIGHT QUADRICEPS, INITIAL ENCOUNTER: ICD-10-CM

## 2024-01-05 DIAGNOSIS — Z96.641 STATUS POST TOTAL HIP REPLACEMENT, RIGHT: ICD-10-CM

## 2024-01-05 PROCEDURE — 73718 MRI LOWER EXTREMITY W/O DYE: CPT

## 2024-01-05 PROCEDURE — G1004 CDSM NDSC: HCPCS

## 2024-01-07 ENCOUNTER — HOSPITAL ENCOUNTER (EMERGENCY)
Facility: HOSPITAL | Age: 49
Discharge: HOME | End: 2024-01-07
Attending: EMERGENCY MEDICINE
Payer: MEDICARE

## 2024-01-07 ENCOUNTER — APPOINTMENT (EMERGENCY)
Dept: RADIOLOGY | Facility: HOSPITAL | Age: 49
End: 2024-01-07
Payer: MEDICARE

## 2024-01-07 VITALS
DIASTOLIC BLOOD PRESSURE: 83 MMHG | TEMPERATURE: 98.3 F | RESPIRATION RATE: 20 BRPM | SYSTOLIC BLOOD PRESSURE: 131 MMHG | HEART RATE: 111 BPM | OXYGEN SATURATION: 96 % | HEIGHT: 59 IN | WEIGHT: 159.61 LBS | BODY MASS INDEX: 32.18 KG/M2

## 2024-01-07 DIAGNOSIS — S93.401A RIGHT ANKLE SPRAIN: Primary | ICD-10-CM

## 2024-01-07 PROCEDURE — 99284 EMERGENCY DEPT VISIT MOD MDM: CPT | Performed by: EMERGENCY MEDICINE

## 2024-01-07 PROCEDURE — 73650 X-RAY EXAM OF HEEL: CPT

## 2024-01-07 PROCEDURE — 99283 EMERGENCY DEPT VISIT LOW MDM: CPT

## 2024-01-07 PROCEDURE — 73610 X-RAY EXAM OF ANKLE: CPT

## 2024-01-07 RX ORDER — OXYCODONE HYDROCHLORIDE AND ACETAMINOPHEN 5; 325 MG/1; MG/1
2 TABLET ORAL ONCE
Status: COMPLETED | OUTPATIENT
Start: 2024-01-07 | End: 2024-01-07

## 2024-01-07 RX ORDER — OXYCODONE HYDROCHLORIDE AND ACETAMINOPHEN 5; 325 MG/1; MG/1
1-2 TABLET ORAL EVERY 6 HOURS PRN
Qty: 12 TABLET | Refills: 0 | Status: SHIPPED | OUTPATIENT
Start: 2024-01-07 | End: 2024-01-17 | Stop reason: SDUPTHER

## 2024-01-07 RX ADMIN — OXYCODONE HYDROCHLORIDE AND ACETAMINOPHEN 2 TABLET: 5; 325 TABLET ORAL at 14:29
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
## 2024-01-08 ENCOUNTER — TELEPHONE (OUTPATIENT)
Age: 49
End: 2024-01-08

## 2024-01-08 NOTE — TELEPHONE ENCOUNTER
Note and imaging reviewed. No acute fracture in the ankle. We have MRI right femur to review with patient at her appt on Friday. No change in plan at this time.

## 2024-01-08 NOTE — TELEPHONE ENCOUNTER
Caller: patient    Doctor: Stevan    Reason for call: Patient wanted to keep Dr updated, patient sprained her ankle while wearing brace     Call back#: [phone]

## 2024-01-10 DIAGNOSIS — Z96.641 STATUS POST TOTAL HIP REPLACEMENT, RIGHT: Primary | ICD-10-CM

## 2024-01-10 DIAGNOSIS — M62.81 QUADRICEPS WEAKNESS: ICD-10-CM

## 2024-01-10 NOTE — TELEPHONE ENCOUNTER
Caller: patient    Doctor: Stevan    Reason for call: patient called the office stating that the earliest that they could get for an EMG is July. Patient was scheduled for 7/2/24.     Call back#: [phone]

## 2024-01-11 NOTE — TELEPHONE ENCOUNTER
Caller: MARY    Doctor: Demetra Delarosa    Reason for call: Checking on status of sooner EMG    Call back#: [phone]

## 2024-01-12 ENCOUNTER — TELEPHONE (OUTPATIENT)
Age: 49
End: 2024-01-12

## 2024-01-12 NOTE — TELEPHONE ENCOUNTER
Please call patient and instruct that she should continue to wear the TROM brace and air cast until her follow-up with . Are you able to fit her in to be seen next week?

## 2024-01-12 NOTE — TELEPHONE ENCOUNTER
Caller: patient    Doctor: Stevan    Reason for call: Patient was suppose to have appointment today but had to reschedule due to provider, patient would like to know if she should still wear the brace and air cast moving forward   Please advise     Call back#: [phone]

## 2024-01-17 ENCOUNTER — HOSPITAL ENCOUNTER (OUTPATIENT)
Dept: NEUROLOGY | Facility: CLINIC | Age: 49
Discharge: HOME | End: 2024-01-17
Payer: MEDICARE

## 2024-01-17 DIAGNOSIS — S93.401A RIGHT ANKLE SPRAIN: ICD-10-CM

## 2024-01-17 DIAGNOSIS — Z96.641 STATUS POST TOTAL HIP REPLACEMENT, RIGHT: ICD-10-CM

## 2024-01-17 DIAGNOSIS — M62.81 QUADRICEPS WEAKNESS: ICD-10-CM

## 2024-01-17 PROBLEM — G57.21 FEMORAL NEUROPATHY OF RIGHT LOWER EXTREMITY: Status: ACTIVE | Noted: 2024-01-17

## 2024-01-17 PROCEDURE — 95910 NRV CNDJ TEST 7-8 STUDIES: CPT | Performed by: PSYCHIATRY & NEUROLOGY

## 2024-01-17 PROCEDURE — 95886 MUSC TEST DONE W/N TEST COMP: CPT | Performed by: PSYCHIATRY & NEUROLOGY

## 2024-01-17 RX ORDER — OXYCODONE HYDROCHLORIDE AND ACETAMINOPHEN 5; 325 MG/1; MG/1
1 TABLET ORAL DAILY PRN
Qty: 7 TABLET | Refills: 0 | Status: SHIPPED | OUTPATIENT
Start: 2024-01-17

## 2024-01-17 NOTE — TELEPHONE ENCOUNTER
Please let patient know that script was sent for oxycodone-acetaminophen 5-325 mg that she can take daily prn severe pain. We would plan for this to be her final opioid script as we have continued to wean down, which she has done well with. We will see her next week to review all of her imaging and do a post op check. Thanks!

## 2024-01-17 NOTE — TELEPHONE ENCOUNTER
Patient called stating that she has not had any pain medication in a week and is in severe pain. Patient would like someone from [facility] office to discuss this refill or if there is something else she can be given    Reason for call:   [x] Refill   [] Prior Auth  [] Other:     Office:   [] PCP/Provider -   [x] Specialty/Provider - Ortho    Medication:     Oxycodone-acetaminophen 5-325mg- take 1-2 tablet by mouth every 6 hours as needed    Pharmacy: [facility]     Does the patient have enough for 3 days?   [] Yes   [x] No - Send as HP to POD

## 2024-01-18 ENCOUNTER — TELEPHONE (OUTPATIENT)
Age: 49
End: 2024-01-18

## 2024-01-18 NOTE — TELEPHONE ENCOUNTER
Thanks for offering sooner appt to patient. If she was agreeable to taking oxycodone as prescribed, no additional changes would be necessary at this time. We will review options and test results at her scheduled appt on 1/23. Thanks!

## 2024-01-18 NOTE — TELEPHONE ENCOUNTER
Called & spoke to patient. Unfortunately she cannot find a ride for tomorrow last minute so tomorrow wont work. Her only question was how she was supposed to take her Percocet and we mutually agreed as it is written on Rx. I let her know she will likely be called tomorrow.

## 2024-01-18 NOTE — TELEPHONE ENCOUNTER
Caller: MARY    Doctor: Stevan    Reason for call: patient's pain pills are not helping. Patient took a percocet 15 mg from a friend and it helped enough to help her sleep. Patient would like to know what else can be called in to Pharmacy  Please advise    Call back#: [phone]

## 2024-01-23 ENCOUNTER — OFFICE VISIT (OUTPATIENT)
Dept: OBGYN CLINIC | Facility: MEDICAL CENTER | Age: 49
End: 2024-01-23

## 2024-01-23 VITALS
HEART RATE: 80 BPM | SYSTOLIC BLOOD PRESSURE: 137 MMHG | HEIGHT: 59 IN | DIASTOLIC BLOOD PRESSURE: 78 MMHG | BODY MASS INDEX: 32.24 KG/M2

## 2024-01-23 DIAGNOSIS — Z96.641 STATUS POST TOTAL HIP REPLACEMENT, RIGHT: Primary | ICD-10-CM

## 2024-01-23 DIAGNOSIS — G57.21 FEMORAL NERVE PALSY, RIGHT: ICD-10-CM

## 2024-01-23 PROCEDURE — 99024 POSTOP FOLLOW-UP VISIT: CPT | Performed by: ORTHOPAEDIC SURGERY

## 2024-01-23 RX ORDER — OXYCODONE HYDROCHLORIDE 10 MG/1
10 TABLET ORAL 2 TIMES DAILY PRN
Qty: 14 TABLET | Refills: 0 | Status: SHIPPED | OUTPATIENT
Start: 2024-01-23

## 2024-01-23 RX ORDER — GABAPENTIN 300 MG/1
300 CAPSULE ORAL 3 TIMES DAILY
Qty: 90 CAPSULE | Refills: 2 | Status: SHIPPED | OUTPATIENT
Start: 2024-01-23 | End: 2024-04-22

## 2024-01-23 RX ORDER — ACETAMINOPHEN 500 MG
1000 TABLET ORAL EVERY 8 HOURS PRN
Qty: 180 TABLET | Refills: 2 | Status: SHIPPED | OUTPATIENT
Start: 2024-01-23 | End: 2024-04-22

## 2024-01-23 NOTE — PATIENT INSTRUCTIONS
Advised patient to call insurance for left foot pedal, Oxycodone, and brace coverages.  Patient will received prescription for PT , Aqua therapy, and brace  Patient will  3 prescriptions at the pharmacy or deliver including oxycodone, tylenol and gabapentin.
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
## 2024-01-25 DIAGNOSIS — Z01.818 PRE-OP EXAMINATION: ICD-10-CM

## 2024-01-25 DIAGNOSIS — Z01.818 PREOPERATIVE TESTING: ICD-10-CM

## 2024-01-25 DIAGNOSIS — M16.11 PRIMARY OSTEOARTHRITIS OF ONE HIP, RIGHT: ICD-10-CM

## 2024-01-26 RX ORDER — MULTIVITAMIN
1 TABLET ORAL DAILY
Qty: 30 TABLET | Refills: 5 | Status: SHIPPED | OUTPATIENT
Start: 2024-01-26

## 2024-01-30 DIAGNOSIS — G57.21 FEMORAL NERVE PALSY, RIGHT: ICD-10-CM

## 2024-01-30 DIAGNOSIS — Z96.641 STATUS POST TOTAL REPLACEMENT OF RIGHT HIP: Primary | ICD-10-CM

## 2024-01-30 DIAGNOSIS — Z96.641 STATUS POST TOTAL HIP REPLACEMENT, RIGHT: ICD-10-CM

## 2024-01-30 RX ORDER — OXYCODONE HYDROCHLORIDE 10 MG/1
10 TABLET ORAL 2 TIMES DAILY PRN
Qty: 14 TABLET | Refills: 0 | Status: SHIPPED | OUTPATIENT
Start: 2024-01-30

## 2024-01-30 NOTE — PROGRESS NOTES
I discussed pain regimen with patient.  Will continue current regimen for the next week.  Patient will discuss celebrex refill with her pain mgt physician per her preference.  She knows she can call me for a refill if needed.

## 2024-01-30 NOTE — TELEPHONE ENCOUNTER
"Reason for call:    [x] Refill   [] Prior Auth  [] Other:     Office:   [] PCP/Provider -   [x] Specialty/Provider -[place] / Lurdes Calles    Medication: oxycodone     Dose/Frequency: 10 mg BID PRN    Quantity: Patient state \"one week at a time kills her financially\"    Pharmacy: [facility] [employer]     Does the patient have enough for 3 days?   [x] Yes   [] No - Send as HP to POD    "

## 2024-02-01 RX ORDER — OXYCODONE HYDROCHLORIDE 10 MG/1
10 TABLET ORAL 2 TIMES DAILY PRN
Qty: 14 TABLET | Refills: 0 | OUTPATIENT
Start: 2024-02-01

## 2024-02-08 ENCOUNTER — OFFICE VISIT (OUTPATIENT)
Dept: OBGYN CLINIC | Facility: MEDICAL CENTER | Age: 49
End: 2024-02-08

## 2024-02-08 ENCOUNTER — TELEPHONE (OUTPATIENT)
Dept: OBGYN CLINIC | Facility: HOSPITAL | Age: 49
End: 2024-02-08

## 2024-02-08 VITALS
BODY MASS INDEX: 32.24 KG/M2 | SYSTOLIC BLOOD PRESSURE: 140 MMHG | DIASTOLIC BLOOD PRESSURE: 86 MMHG | HEIGHT: 59 IN | HEART RATE: 93 BPM

## 2024-02-08 DIAGNOSIS — M62.559 ATROPHY OF QUADRICEPS FEMORIS MUSCLE: ICD-10-CM

## 2024-02-08 DIAGNOSIS — G57.21 FEMORAL NERVE PALSY, RIGHT: ICD-10-CM

## 2024-02-08 DIAGNOSIS — Z96.641 STATUS POST TOTAL REPLACEMENT OF RIGHT HIP: Primary | ICD-10-CM

## 2024-02-08 PROCEDURE — 99024 POSTOP FOLLOW-UP VISIT: CPT | Performed by: ORTHOPAEDIC SURGERY

## 2024-02-08 RX ORDER — OXYCODONE HYDROCHLORIDE 5 MG/1
10 TABLET ORAL 2 TIMES DAILY PRN
Qty: 28 TABLET | Refills: 0 | Status: SHIPPED | OUTPATIENT
Start: 2024-02-08

## 2024-02-08 NOTE — TELEPHONE ENCOUNTER
Caller: Patient    Doctor: Stevan    Reason for call: Patient is calling because she said Oxycodone 5 mg 1 in the Am and 1 in the PM was suppose to be called into [facility] in [place], also pharmacy is explained to her this need prior authorization.    Please call patient.     Call back#: [phone]
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
## 2024-02-09 ENCOUNTER — TELEPHONE (OUTPATIENT)
Dept: OBGYN CLINIC | Facility: MEDICAL CENTER | Age: 49
End: 2024-02-09

## 2024-02-09 PROBLEM — Z96.641 STATUS POST TOTAL REPLACEMENT OF RIGHT HIP: Status: ACTIVE | Noted: 2024-02-09

## 2024-02-09 PROBLEM — M62.559 ATROPHY OF QUADRICEPS FEMORIS MUSCLE: Status: ACTIVE | Noted: 2024-02-09

## 2024-02-09 NOTE — TELEPHONE ENCOUNTER
DME order for NMES/ FES machine fax to Tiffany Diaz Molina at [phone] successfully this morning.

## 2024-02-09 NOTE — TELEPHONE ENCOUNTER
I spoke with TC yesterday.  I let her know we called her insurance and got the information regarding coverage of her narcotic prescriptions.  She prefers to pay out of pocket for now for her narcotic prescriptions.  I let her know if she changes her mind to let us know.  She verbalized understanding.

## 2024-02-14 DIAGNOSIS — Z96.641 STATUS POST TOTAL REPLACEMENT OF RIGHT HIP: ICD-10-CM

## 2024-02-14 DIAGNOSIS — G57.21 FEMORAL NERVE PALSY, RIGHT: ICD-10-CM

## 2024-02-14 DIAGNOSIS — S93.401A RIGHT ANKLE SPRAIN: ICD-10-CM

## 2024-02-14 NOTE — TELEPHONE ENCOUNTER
Patient was told by provider to call today so she could get her refill by Friday, since pharmacy is closed on Saturday.     Reason for call:   [x] Refill   [] Prior Auth  [] Other:     Office:   [] PCP/Provider -   [x] Specialty/Provider - ortho    Medication: oxyCODONE (Roxicodone) 5 immediate release tablet     Dose/Frequency:   10 mg, Oral, 2 times daily PRN          Quantity: 28    Pharmacy: [facility] - [facility] - [address] [place] [address]     Does the patient have enough for 3 days?   [x] Yes   [] No - Send as HP to POD

## 2024-02-15 DIAGNOSIS — G57.21 FEMORAL NERVE PALSY, RIGHT: ICD-10-CM

## 2024-02-15 DIAGNOSIS — Z96.641 STATUS POST TOTAL REPLACEMENT OF RIGHT HIP: Primary | ICD-10-CM

## 2024-02-15 RX ORDER — OXYCODONE HYDROCHLORIDE 5 MG/1
10 TABLET ORAL 2 TIMES DAILY PRN
Qty: 28 TABLET | Refills: 0 | OUTPATIENT
Start: 2024-02-15

## 2024-02-15 RX ORDER — OXYCODONE HYDROCHLORIDE 5 MG/1
5 TABLET ORAL 2 TIMES DAILY PRN
Qty: 28 TABLET | Refills: 0 | Status: SHIPPED | OUTPATIENT
Start: 2024-02-15

## 2024-02-15 NOTE — PROGRESS NOTES
I spoke with TC regarding oxycodone prescription.  She is tolerating her pain on current regimen of 5mg in the morning and 10mg in the evening prn pain.  We will plan on continuing this regimen for one more week and then attempting to wean down to 5mg in AM and PM prn pain starting on 2/26.  Her pharmacy is only open till 12pm on Saturday and not open on Sunday.  She will otherwise continue on her current pain regimen.  She is meeting with [employer] rep tomorrow to try on the brace they got her.  She was in touch with company regarding stim machine and they are shipping it out today.  No further questions at this time.

## 2024-02-22 DIAGNOSIS — Z96.641 STATUS POST TOTAL REPLACEMENT OF RIGHT HIP: ICD-10-CM

## 2024-02-22 DIAGNOSIS — G57.21 FEMORAL NERVE PALSY, RIGHT: ICD-10-CM

## 2024-02-22 RX ORDER — OXYCODONE HYDROCHLORIDE 5 MG/1
5 TABLET ORAL 2 TIMES DAILY PRN
Qty: 28 TABLET | Refills: 0 | Status: CANCELLED | OUTPATIENT
Start: 2024-02-22

## 2024-02-23 ENCOUNTER — TELEPHONE (OUTPATIENT)
Dept: OBGYN CLINIC | Facility: MEDICAL CENTER | Age: 49
End: 2024-02-23

## 2024-02-23 DIAGNOSIS — G57.21 FEMORAL NEUROPATHY OF RIGHT LOWER EXTREMITY: ICD-10-CM

## 2024-02-23 DIAGNOSIS — Z96.641 STATUS POST TOTAL HIP REPLACEMENT, RIGHT: Primary | ICD-10-CM

## 2024-02-23 RX ORDER — OXYCODONE HYDROCHLORIDE 5 MG/1
5 TABLET ORAL 2 TIMES DAILY PRN
Qty: 14 TABLET | Refills: 0 | Status: SHIPPED | OUTPATIENT
Start: 2024-02-23

## 2024-02-23 RX ORDER — OXYCODONE HYDROCHLORIDE 5 MG/1
5 TABLET ORAL 2 TIMES DAILY PRN
Qty: 28 TABLET | Refills: 0 | OUTPATIENT
Start: 2024-02-23

## 2024-02-23 NOTE — TELEPHONE ENCOUNTER
Pt calling back to let office know that she was actually take 1 in morning and 2 at night. She did not realize that the dose was changed to 2 times daily.

## 2024-02-23 NOTE — TELEPHONE ENCOUNTER
I spoke with TC.  We are going to continue to wean down the oxycodone.  She will transition from taking 5mg in the morning and 10mg at night as needed to taking 5mg in the morning and at night as needed for pain. She confirmed how she is currently taking the medication and agrees with the plan.  She will follow up as planned on Thursday.

## 2024-02-23 NOTE — TELEPHONE ENCOUNTER
Patient called into refill line I asked how I may help her she stated she just spoke to someone no notes noted just that her oxycodone was denied caller hung up call.

## 2024-02-23 NOTE — TELEPHONE ENCOUNTER
Pt calling back to check on refill - resending med to pod so it can be forwarded to office.    Reason for call:   [x] Refill   [] Prior Auth  [] Other:     Office:   [] PCP/Provider -   [x] Specialty/Provider - Stevan     Medication: Roxicodone     Dose/Frequency: 5mg - twice dialy     Quantity: 28    Pharmacy: [facility]     Does the patient have enough for 3 days?   [] Yes   [x] No - Send as HP to POD

## 2024-02-29 ENCOUNTER — OFFICE VISIT (OUTPATIENT)
Dept: OBGYN CLINIC | Facility: MEDICAL CENTER | Age: 49
End: 2024-02-29

## 2024-02-29 VITALS
HEART RATE: 94 BPM | HEIGHT: 59 IN | WEIGHT: 158.8 LBS | DIASTOLIC BLOOD PRESSURE: 86 MMHG | BODY MASS INDEX: 32.01 KG/M2 | SYSTOLIC BLOOD PRESSURE: 135 MMHG

## 2024-02-29 DIAGNOSIS — G57.21 FEMORAL NERVE PALSY, RIGHT: ICD-10-CM

## 2024-02-29 DIAGNOSIS — Z96.641 STATUS POST TOTAL REPLACEMENT OF RIGHT HIP: Primary | ICD-10-CM

## 2024-02-29 PROCEDURE — 99024 POSTOP FOLLOW-UP VISIT: CPT | Performed by: ORTHOPAEDIC SURGERY

## 2024-02-29 RX ORDER — PREGABALIN 25 MG/1
25 CAPSULE ORAL 3 TIMES DAILY
Status: CANCELLED | OUTPATIENT
Start: 2024-02-29

## 2024-02-29 RX ORDER — PREGABALIN 75 MG/1
75 CAPSULE ORAL 2 TIMES DAILY
Qty: 60 CAPSULE | Refills: 2 | Status: SHIPPED | OUTPATIENT
Start: 2024-02-29

## 2024-02-29 NOTE — PATIENT INSTRUCTIONS
Lyrica will be prescribe to patient pending on insurance and instead of patient taking gabapentin. Advised patient she should wean out of gabapentin 300 mg 3 times a day, 300 mg 2 times a day, 300 mg once a day and then transition into taking Lyrica.     Oxycodone 5 mg in the morning and at night. Advised her to take 1 tablet in the morning and 1 tablets at night. Will continue weaning process.
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
## 2024-03-05 DIAGNOSIS — G57.21 FEMORAL NERVE PALSY, RIGHT: ICD-10-CM

## 2024-03-05 DIAGNOSIS — Z96.641 STATUS POST TOTAL REPLACEMENT OF RIGHT HIP: ICD-10-CM

## 2024-03-06 DIAGNOSIS — Z96.641 STATUS POST TOTAL HIP REPLACEMENT, RIGHT: Primary | ICD-10-CM

## 2024-03-06 DIAGNOSIS — G57.21 FEMORAL NEUROPATHY OF RIGHT LOWER EXTREMITY: ICD-10-CM

## 2024-03-06 RX ORDER — OXYCODONE HYDROCHLORIDE 5 MG/1
5 TABLET ORAL 2 TIMES DAILY PRN
Qty: 14 TABLET | Refills: 0 | Status: SHIPPED | OUTPATIENT
Start: 2024-03-06

## 2024-03-06 RX ORDER — OXYCODONE HYDROCHLORIDE 5 MG/1
5 TABLET ORAL 2 TIMES DAILY PRN
Qty: 28 TABLET | Refills: 0 | OUTPATIENT
Start: 2024-03-06

## 2024-03-06 NOTE — PROGRESS NOTES
I spoke with TC and we discussed pain regimen. She is trying lyrica and will continue this.  She will continue with tylenol and celebrex.  She will continue with oxycodone 5mg BID prn pain but will try to not take 10mg everyday and stretch it out to 10 days.  She has also added lidocaine patches.  She will call if any issues.

## 2024-03-06 NOTE — TELEPHONE ENCOUNTER
Caller: Patient     Doctor: Stevan    Reason for call: Patient is calling stating she will be out of oxycodone today and she is requesting it be refilled as soon as possible.    Call back#: [phone]

## 2024-03-17 DIAGNOSIS — G57.21 FEMORAL NERVE PALSY, RIGHT: ICD-10-CM

## 2024-03-17 DIAGNOSIS — Z96.641 STATUS POST TOTAL HIP REPLACEMENT, RIGHT: ICD-10-CM

## 2024-03-18 RX ORDER — PSEUDOEPHED/ACETAMINOPH/DIPHEN 30MG-500MG
TABLET ORAL
Qty: 180 TABLET | Refills: 1 | Status: SHIPPED | OUTPATIENT
Start: 2024-03-18

## 2024-03-21 ENCOUNTER — OFFICE VISIT (OUTPATIENT)
Dept: OBGYN CLINIC | Facility: MEDICAL CENTER | Age: 49
End: 2024-03-21
Payer: MEDICARE

## 2024-03-21 VITALS
HEART RATE: 102 BPM | HEIGHT: 59 IN | SYSTOLIC BLOOD PRESSURE: 115 MMHG | DIASTOLIC BLOOD PRESSURE: 76 MMHG | WEIGHT: 156 LBS | BODY MASS INDEX: 31.45 KG/M2

## 2024-03-21 DIAGNOSIS — Z96.641 STATUS POST TOTAL HIP REPLACEMENT, RIGHT: Primary | ICD-10-CM

## 2024-03-21 DIAGNOSIS — G58.9 NERVE PALSY: ICD-10-CM

## 2024-03-21 PROCEDURE — 99213 OFFICE O/P EST LOW 20 MIN: CPT | Performed by: ORTHOPAEDIC SURGERY

## 2024-03-21 RX ORDER — PREGABALIN 150 MG/1
150 CAPSULE ORAL 2 TIMES DAILY
Qty: 60 CAPSULE | Refills: 1 | Status: SHIPPED | OUTPATIENT
Start: 2024-03-21

## 2024-03-21 RX ORDER — OXYCODONE HYDROCHLORIDE 5 MG/1
5 TABLET ORAL DAILY PRN
Qty: 7 TABLET | Refills: 0 | Status: SHIPPED | OUTPATIENT
Start: 2024-03-21
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
## 2024-04-01 ENCOUNTER — TELEPHONE (OUTPATIENT)
Age: 49
End: 2024-04-01

## 2024-04-01 NOTE — TELEPHONE ENCOUNTER
Called and spoke with pt, R anterior BRIDGER on 11/29/23, right femoral nerve palsy. Pt had phyiscal therapy on Friday and she was standing looking through appts and her knee giorgio while standing still while in the brace. Pt also experienced buckling on the stairs the other day and she grabbed the railing and hurt her left hip.  Pt states her lyrica is not working. She has been oxycodone and her other prescribed prescriptions. Pt has almost fallen twice within the last 4 days. The patient states she does have R  knee pain in the morning and its very stiff. Pt does not report anyother symptoms like warmth, redness, bruising or increase swelling to the hip or knee.     Please advise

## 2024-04-01 NOTE — TELEPHONE ENCOUNTER
I called an spoke with Ms. Kearns.  We reviewed what has been happening lately.  She has expressed difficulty with keeping brace locked in extension and then experiencing more falls.  She also continues to have pain and does not think lyrica 150mg twice daily is helping.  She is frustrated and we talked twice for about 15 minutes regarding her situation.  I let her know the importance of locking her brace in extension while standing to prevent her from falling.  We will also increase her lyrica to 300mg BID starting 4/4.  She will continue with tylenol and celebrex as prescribed.  She has oxycodone to take as needed.  We talked about the importance of continuing her medications, restricting activity when pain increases, being safe in the brace and continuing to get emotional support through her psychiatrist and therapist.  She declined an appointment in the office for Wednesday.  She will call later in the week with an update.  She knows she can call sooner if needed.

## 2024-04-01 NOTE — TELEPHONE ENCOUNTER
Caller: Joslyn     Doctor: Stevan    Reason for call: Patient stated that she has been falling a lot. Her knee has been giving out on her. She said she is falling while she is just standing. She is asking what she should do. She is also having a lot of pain.     Call back#: [phone]

## 2024-04-08 ENCOUNTER — TELEPHONE (OUTPATIENT)
Dept: OBGYN CLINIC | Facility: HOSPITAL | Age: 49
End: 2024-04-08

## 2024-04-08 DIAGNOSIS — G58.9 NERVE PALSY: ICD-10-CM

## 2024-04-08 DIAGNOSIS — Z96.641 STATUS POST TOTAL HIP REPLACEMENT, RIGHT: Primary | ICD-10-CM

## 2024-04-08 RX ORDER — PREGABALIN 300 MG/1
300 CAPSULE ORAL 2 TIMES DAILY
Qty: 60 CAPSULE | Refills: 1 | Status: SHIPPED | OUTPATIENT
Start: 2024-04-08
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
## 2024-04-08 NOTE — TELEPHONE ENCOUNTER
After reviewing patient chart, lyrica was increased to 300mg BID per note from Dr Calles on 4/1/24.     Please advise, thank you!

## 2024-04-16 ENCOUNTER — OFFICE VISIT (OUTPATIENT)
Dept: OBGYN CLINIC | Facility: MEDICAL CENTER | Age: 49
End: 2024-04-16
Payer: MEDICARE

## 2024-04-16 ENCOUNTER — TELEPHONE (OUTPATIENT)
Age: 49
End: 2024-04-16

## 2024-04-16 ENCOUNTER — APPOINTMENT (OUTPATIENT)
Dept: RADIOLOGY | Facility: MEDICAL CENTER | Age: 49
End: 2024-04-16
Payer: MEDICARE

## 2024-04-16 VITALS
SYSTOLIC BLOOD PRESSURE: 126 MMHG | HEART RATE: 72 BPM | DIASTOLIC BLOOD PRESSURE: 85 MMHG | HEIGHT: 59 IN | BODY MASS INDEX: 30.24 KG/M2 | WEIGHT: 150 LBS

## 2024-04-16 DIAGNOSIS — G58.9 NERVE PALSY: ICD-10-CM

## 2024-04-16 DIAGNOSIS — M25.571 ACUTE RIGHT ANKLE PAIN: ICD-10-CM

## 2024-04-16 DIAGNOSIS — M17.11 PRIMARY OSTEOARTHRITIS OF RIGHT KNEE: Primary | ICD-10-CM

## 2024-04-16 DIAGNOSIS — M25.561 RIGHT KNEE PAIN, UNSPECIFIED CHRONICITY: ICD-10-CM

## 2024-04-16 DIAGNOSIS — Z96.641 STATUS POST TOTAL HIP REPLACEMENT, RIGHT: ICD-10-CM

## 2024-04-16 PROCEDURE — 99214 OFFICE O/P EST MOD 30 MIN: CPT | Performed by: ORTHOPAEDIC SURGERY

## 2024-04-16 PROCEDURE — 73610 X-RAY EXAM OF ANKLE: CPT

## 2024-04-16 PROCEDURE — 20610 DRAIN/INJ JOINT/BURSA W/O US: CPT | Performed by: ORTHOPAEDIC SURGERY

## 2024-04-16 PROCEDURE — 73564 X-RAY EXAM KNEE 4 OR MORE: CPT

## 2024-04-16 RX ORDER — OXYCODONE HYDROCHLORIDE 5 MG/1
5 TABLET ORAL DAILY PRN
Qty: 5 TABLET | Refills: 0 | Status: SHIPPED | OUTPATIENT
Start: 2024-04-16

## 2024-04-16 RX ADMIN — LIDOCAINE HYDROCHLORIDE 3 ML: 10 INJECTION, SOLUTION INFILTRATION; PERINEURAL at 16:30

## 2024-04-16 RX ADMIN — TRIAMCINOLONE ACETONIDE 40 MG: 40 INJECTION, SUSPENSION INTRA-ARTICULAR; INTRAMUSCULAR at 16:30

## 2024-04-16 NOTE — TELEPHONE ENCOUNTER
Caller: MARY    Doctor: Stevan    Reason for call: Patient is very upset. She states her pain is causing her to have unhealthy thoughts. Patient states she needs relief of the pain. The pain makes her mental status worse.    Call back#: [phone]
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
## 2024-04-17 ENCOUNTER — TELEPHONE (OUTPATIENT)
Dept: OBGYN CLINIC | Facility: OTHER | Age: 49
End: 2024-04-17

## 2024-04-17 RX ORDER — LIDOCAINE HYDROCHLORIDE 10 MG/ML
3 INJECTION, SOLUTION INFILTRATION; PERINEURAL
Status: COMPLETED | OUTPATIENT
Start: 2024-04-16 | End: 2024-04-16

## 2024-04-17 RX ORDER — TRIAMCINOLONE ACETONIDE 40 MG/ML
40 INJECTION, SUSPENSION INTRA-ARTICULAR; INTRAMUSCULAR
Status: COMPLETED | OUTPATIENT
Start: 2024-04-16 | End: 2024-04-16

## 2024-04-17 NOTE — TELEPHONE ENCOUNTER
Was waiting for transfer call and pt disconnected call.  Reviewed ROSA Peraza's msg.  Called and spoke w/pt and re-enforced ROSA Peraza's msg to pt.  Pt states she is concerned for falling while using walker/crutches/cane to offload to assist w/pain.  Advised that she could discuss how to walk w/crutches/walker/cane with PT that may give her some guidance to help her not fall.  She states she knows how to walk w/crutches. Has a walker w/wheels.  Doesn't use cane although she has one.  Also has a service dog which makes it more difficult to get around.  She has mental issues and was on phone w/ but decided to call her back.  States psychiatrist is only in office one day a week.  Pt states she is feeling like she is going backwards w/ her physical status since Demetra advised to use crutches/walker and mentally since she is in chronic pain and oxycodone 5 mg does not help.  Unclear as to what she was talking about w/oxy 2.5 mg as that was not sent.  She takes tylenol, lyrica and celebrex.  Which do not help. Injection did not help.  She states that she does think about hurting self.Advised that she should go to ED.  States that will not help her and she is calling  to talk it out with her.   Verbalizations were repetitious.  She states she has no one to help her.     Advised that this msg would be sent to Dr Calles and Demetra Peraza and she would hear back from someone on the team when addressed.     Is is possible to get  involved since this is a R BRIDGER from 11/29/23?    Please review and advise further.  Thank you.

## 2024-04-17 NOTE — TELEPHONE ENCOUNTER
Patient called back to speak to Demetra - states she was disconnected - tried to contact [place] office but before I could transfer patient had disconnected call - Messaged Demetra and she is with patients and will call her back later.  Please call patient at [phone]
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
## 2024-04-17 NOTE — TELEPHONE ENCOUNTER
Spoke with patient at length. I called the [facility] pharmacy and oxycodone script was received last night at 7 pm and script will be ready for patient today. Patient aware. She continues to be very emotional and feels she is taking steps back. She expresses concern about facing chronic pain. She feels she is unable to wean from the oxycodone 5 mg tabs. Patient states this is taking a toll on her mentally. I offered support. Patient states she did call her therapist last evening and has not heard back yet. Multiple times I encouraged patient to seek care at the ED for emergency psych support if she has thoughts of harming herself. She is aware this is an option. We discussed using the crutches for assistance to help prevent falls. She is reluctant to do this but I encouraged her to try as we would like to aim for no more falls. Patient terminated the call.     Nichole, can you please contact patient later this afternoon to check on her? Again please encourage patient to seek care at ED if expressing any self harm tendencies. I will be reaching out to PCP as well for additional help with this. Thanks

## 2024-04-17 NOTE — TELEPHONE ENCOUNTER
Caller: Patient    Doctor: Stevan    Reason for call:     Patient is calling about the Oxycodone 5 mg refill, she spoke to the [facility] in [place], they are stating the script was not called in.  She is in a lot of pain and is asking   If the office could check why the pharmacy do not receive the script request...  Please call her back to advise...  She is upset no one call her back.    Call back#: [phone]
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
## 2024-04-17 NOTE — TELEPHONE ENCOUNTER
Caller: Patient    Doctor: Dr. Calles    Reason for call: Patient calling back asking to speak the previous PEP.  Attempted to transfer call to previous PEP as patient asking to speak to female.  PEP receiving calls. Did advise that message was sent to PA and she will be returning patient's call.  Reached out to the triage nurse and while attempting to transfer patient to triage nurse patient disconnected.  Triage nurse going to attempt to call patient back.      Call back#: [phone]

## 2024-04-23 ENCOUNTER — PATIENT OUTREACH (OUTPATIENT)
Dept: OBGYN CLINIC | Facility: HOSPITAL | Age: 49
End: 2024-04-23

## 2024-04-23 DIAGNOSIS — Z78.9 NEED FOR FOLLOW-UP BY SOCIAL WORKER: Primary | ICD-10-CM

## 2024-04-23 NOTE — PROGRESS NOTES
SWCM received a new referral in regard to pt I had worked with previously, Pt has been having some concerns in regard to OP MH and provided wanted SW to ensure pt has all needed resources. I attempted to reach pt today and she was at PT and requested I call her back at a later time. SWCM will remain available.

## 2024-04-25 ENCOUNTER — PATIENT OUTREACH (OUTPATIENT)
Dept: OBGYN CLINIC | Facility: HOSPITAL | Age: 49
End: 2024-04-25
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
## 2024-05-01 ENCOUNTER — PATIENT OUTREACH (OUTPATIENT)
Dept: OBGYN CLINIC | Facility: HOSPITAL | Age: 49
End: 2024-05-01

## 2024-05-01 NOTE — PROGRESS NOTES
[facility] made second to reach pts therapist Crystal Dickson to confimr she is pts therapist to ensure pt is receiving OP MH services. I was unable to reach her and a message was left to please return [facility] call. SWCM will remain available.

## 2024-05-02 ENCOUNTER — OFFICE VISIT (OUTPATIENT)
Dept: OBGYN CLINIC | Facility: MEDICAL CENTER | Age: 49
End: 2024-05-02
Payer: MEDICARE

## 2024-05-02 VITALS
DIASTOLIC BLOOD PRESSURE: 70 MMHG | WEIGHT: 161 LBS | BODY MASS INDEX: 32.46 KG/M2 | HEIGHT: 59 IN | SYSTOLIC BLOOD PRESSURE: 110 MMHG

## 2024-05-02 DIAGNOSIS — G58.9 NERVE PALSY: ICD-10-CM

## 2024-05-02 DIAGNOSIS — M17.11 PRIMARY OSTEOARTHRITIS OF RIGHT KNEE: Primary | ICD-10-CM

## 2024-05-02 DIAGNOSIS — Z96.641 STATUS POST TOTAL REPLACEMENT OF RIGHT HIP: ICD-10-CM

## 2024-05-02 PROCEDURE — 99213 OFFICE O/P EST LOW 20 MIN: CPT | Performed by: ORTHOPAEDIC SURGERY

## 2024-05-02 NOTE — LETTER
May 2, 2024     Patient: Ilia Kearns  YOB: 1975  Date of Visit: 5/2/2024      To Goev[facility] Arredondo- [place] [facility]    Ilia Kearns is under my professional care. Ilia was seen in my office on 5/2/2024.   Your tenant, Ilia Kearns, for [address], [facility], is under my professional care.  I support this patient as a disabled person according to the Fair Housing Act in the [place] Human Relations Act.     I support this patient in receiving reasonable accommodation in order to have equal opportunity to vacate her apartment given her underlying medical conditions. I am requesting an additional 60 days be added to her lease from the end of the current lease.  The patient will need accommodations because she will need assistance from others in order to relocate.  She will need assistance to help move her possessions and to identify/apply for another suitable apartment.    If you have any questions or concerns, please don't hesitate to call.         Sincerely,       Lurdes Calles,   [phone]      CC: No Recipients
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
## 2024-05-02 NOTE — LETTER
May 2, 2024     Patient: Ilia Kearns  YOB: 1975  Date of Visit: 5/2/2024      To Whom it May Concern:    Ilia Kearns is under my professional care. Ilia was seen in my office on 5/2/2024. Please allow her more time to find new living situation. Please give Ilia to have additional 60 days to vacate housing.          Sincerely,          Lurdes Calles,         CC:   No Recipients

## 2024-05-07 ENCOUNTER — TELEPHONE (OUTPATIENT)
Age: 49
End: 2024-05-07

## 2024-05-07 ENCOUNTER — OFFICE VISIT (OUTPATIENT)
Dept: PODIATRY | Facility: CLINIC | Age: 49
End: 2024-05-07
Payer: MEDICARE

## 2024-05-07 VITALS — BODY MASS INDEX: 32.5 KG/M2 | WEIGHT: 161.2 LBS | HEIGHT: 59 IN

## 2024-05-07 DIAGNOSIS — M19.071 DJD (DEGENERATIVE JOINT DISEASE), ANKLE AND FOOT, RIGHT: ICD-10-CM

## 2024-05-07 DIAGNOSIS — G57.20 DISORDER OF FEMORAL NERVE: Primary | ICD-10-CM

## 2024-05-07 DIAGNOSIS — M76.821 POSTERIOR TIBIAL TENDONITIS, RIGHT: ICD-10-CM

## 2024-05-07 PROCEDURE — 99203 OFFICE O/P NEW LOW 30 MIN: CPT | Performed by: PODIATRIST

## 2024-05-07 RX ORDER — DEXTROAMPHETAMINE SACCHARATE, AMPHETAMINE ASPARTATE, DEXTROAMPHETAMINE SULFATE AND AMPHETAMINE SULFATE 1.25; 1.25; 1.25; 1.25 MG/1; MG/1; MG/1; MG/1
TABLET ORAL
COMMUNITY
Start: 2024-04-17

## 2024-05-07 RX ORDER — AMLODIPINE BESYLATE 5 MG/1
5 TABLET ORAL
COMMUNITY
Start: 2024-04-16 | End: 2025-04-16

## 2024-05-07 NOTE — PROGRESS NOTES
Name: Ilia Kearns      : 1975      MRN: [id number]  Encounter Provider: Dre Hicks DPM  Encounter Date: 2024   Encounter department: [facility] PODIATRY [place]    Assessment & Plan     1. Disorder of femoral nerve  -     Ambulatory referral to Spine & Pain Management; Future    2. Posterior tibial tendonitis, right  -     Ambulatory referral to Physical Therapy; Future  -     Durable Medical Equipment    3. DJD (degenerative joint disease), ankle and foot, right  -     Durable Medical Equipment      Patient has some mild symptoms along the posterior tibial tendon, however it does appear that more along the saphenous nerve distribution distal to the femoral nerve neuropathy that she has.    Will recheck her in a couple months to see how she is doing at that time.  I would like to recommend physical therapy to continue focusing on the ankle and foot.  Additionally will refer her to pain management for further evaluation given her hypersensitivity hopefully they can help her with this.  She has been on a course of neuropathic medications, she states that this has not helped her.    Will try lace up ankle brace for stability.  She is already in a brace for her knee.         Return in about 3 months (around 2024).    Subjective     Patient had x-rays taken on 2024.  She has previous history of anchors in the distal fibula there is narrowing noted at the tibiotalar joint with angulation no acute fracture dislocations noted currently.  She had an ankle sprain that occurred on 2024.  She was ambulating in her room and fell last evening she had pain and swelling since then.  Since hip replacement  she has had continued issues since then.  Patient has previous history of November hip surgery on the right side.  She has since then had more issues with the lower extremity.  She has stated that she has pain and discomfort noted around the anterior portion of the proximal  tibia extending down over the medial aspect of the ankle.  She states that she has been seen by physical therapy and they have said that she may have posterior tibial tendinitis.  Patient does have a history of surgical repair back in the 90s of the ankle with anchors in [place].  She does seem to have significant hypersensitivity noted to the area in the distribution of the saphenous nerve.  She does have an EMG that I reviewed which does show evidence of femoral neuropathy.      Review of Systems   Constitutional:  Negative for chills and fever.   Respiratory:  Negative for chest tightness and shortness of breath.    Gastrointestinal:  Negative for nausea and vomiting.       Current Outpatient Medications on File Prior to Visit   Medication Sig   • acetaminophen (TYLENOL) 500 mg tablet Take 2 tablets (1,000 mg total) by mouth every 8 (eight) hours   • Acetaminophen Extra Strength 500 MG TABS TAKE TWO TABLETS BY MOUTH EVERY EIGHT HOURS AS NEEDED FOR MILD PAIN   • amLODIPine (NORVASC) 5 mg tablet Take 5 mg by mouth   • amphetamine-dextroamphetamine (ADDERALL) 20 mg tablet Take 20 mg by mouth 2 (two) times a day   • amphetamine-dextroamphetamine (ADDERALL) 5 MG tablet    • ascorbic acid (VITAMIN C) 500 MG tablet Take 1 tablet (500 mg total) by mouth daily   • aspirin (ECOTRIN) 325 mg EC tablet TAKE ONE TABLET BY MOUTH TWICE A DAY WITH FOOD   • benztropine (COGENTIN) 1 mg tablet Take 1 mg by mouth daily at bedtime   • celecoxib (CeleBREX) 200 mg capsule Take 1 capsule (200 mg total) by mouth 2 (two) times a day   • docusate sodium (COLACE) 100 mg capsule Take 1 capsule (100 mg total) by mouth 2 (two) times a day   • doxepin (SINEquan) 100 mg capsule Take 100 mg by mouth daily at bedtime   • ferrous sulfate 324 (65 Fe) mg Take 1 tablet (324 mg total) by mouth daily   • fluPHENAZine (PROLIXIN) 5 mg tablet Take 5 mg by mouth daily at bedtime   • folic acid (FOLVITE) 1 mg tablet TAKE ONE TABLET BY MOUTH ONCE DAILY   •  guaiFENesin (MUCINEX) 600 mg 12 hr tablet TAKE ONE TABLET BY MOUTH TWICE A DAY AS NEEDED FOR COUGH OR CONGESTION   • haloperidol (HALDOL) 10 mg tablet 10 mg 5-10 Mg TID as needed; Currently taking 10 mg in afternoon and bedtime   • haloperidol (HALDOL) 5 mg tablet    • lidocaine (LIDODERM) 5 %    • lidocaine (XYLOCAINE) 5 % ointment Apply 1 application topically 4 (four) times a day as needed (moderate pain not relieved by acetaminophen, for hand)   • liothyronine (CYTOMEL) 5 mcg tablet Take 2 tablets (10 mcg total) by mouth daily   • loratadine (CLARITIN) 10 mg tablet Take 1 tablet (10 mg total) by mouth daily at bedtime   • Multiple Vitamin (multivitamin) tablet TAKE ONE TABLET BY MOUTH ONCE DAILY   • mupirocin (BACTROBAN) 2 % ointment Apply topically daily Do not start before December 13, 2022.   • naloxone (NARCAN) 4 mg/0.1 mL nasal spray Administer 1 spray into a nostril. If no response after 2-3 minutes, give another dose in the other nostril using a new spray.   • NON FORMULARY in the morning Medical Marijuana   • ondansetron (ZOFRAN-ODT) 4 mg disintegrating tablet Take 4 mg by mouth every 6 (six) hours   • oxyCODONE (ROXICODONE) 5 immediate release tablet Take 1 tablet (5 mg total) by mouth daily as needed for severe pain Max Daily Amount: 5 mg   • oxyCODONE (Roxicodone) 5 immediate release tablet Take 1 tablet (5 mg total) by mouth daily as needed for moderate pain Max Daily Amount: 5 mg   • oxyCODONE-acetaminophen (Percocet) 5-325 mg per tablet Take 1 tablet by mouth daily as needed for severe pain Max Daily Amount: 1 tablet   • potassium chloride (K-DUR,KLOR-CON) 10 mEq tablet Take 1 tablet (10 mEq total) by mouth 2 (two) times a day   • prazosin (MINIPRESS) 5 mg capsule Take 2 capsules (10 mg total) by mouth daily at bedtime   • prazosin (MINIPRESS) 5 mg capsule Take 1 capsule (5 mg total) by mouth daily Do not start before December 13, 2022.   • pregabalin (LYRICA) 300 MG capsule Take 1 capsule (300 mg  "total) by mouth 2 (two) times a day   • simvastatin (ZOCOR) 20 mg tablet Take 20 mg by mouth daily at bedtime With dinner   • Stimulant Laxative 8.6-50 MG per tablet Take 2 tablets by mouth 2 (two) times a day   • tiZANidine (ZANAFLEX) 4 mg tablet Take 1 tablet (4 mg total) by mouth every 6 (six) hours as needed for muscle spasms   • Trintellix 10 MG tablet Take 10 mg by mouth 2 (two) times a day   • diazepam (VALIUM) 5 mg tablet Take 1 tablet (5 mg total) by mouth 3 (three) times a day as needed for muscle spasms (for muscle spasms only, please offer Atarax for anxiety) for up to 10 days (Patient taking differently: Take 10 mg by mouth 3 (three) times a day as needed for muscle spasms or anxiety (Anxiety))   • montelukast (SINGULAIR) 10 mg tablet Take 1 tablet (10 mg total) by mouth daily at bedtime   • promethazine (PHENERGAN) 12.5 MG tablet Take 1 tablet (12.5 mg total) by mouth every 6 (six) hours as needed for nausea or vomiting (Patient not taking: Reported on 5/7/2024)       Objective     Ht 4' 11\" (1.499 m)   Wt 73.1 kg (161 lb 3.2 oz)   BMI 32.56 kg/m²     Physical Exam  Constitutional:       Appearance: Normal appearance.   Feet:      Comments: Vascular: Intact pedal pulses bilateral DP and PT.  Neurological: Patient has discomfort and tenderness with hypersensitivity noted along the saphenous nerve distribution anterior ankle and lower leg.  Musculoskeletal: Muscle strength bilateral intact with dorsiflexion, inversion, eversion and plantarflexion.  There is some tenderness noted on palpation at the level of the posterior tibial tendon.  There is some discomfort noted on palpation at the anterior talofibular ligament.  There is discomfort noted on palpation at the ankle joint.  Dermatological: No open lesions or ulcerations noted bilateral.    Neurological:      Mental Status: She is alert.       "

## 2024-05-07 NOTE — TELEPHONE ENCOUNTER
The form has been uploaded into chart in media under description landlord letter. I can print and show you tomorrow if you'd like versus use signature stamp

## 2024-05-07 NOTE — TELEPHONE ENCOUNTER
"Caller: Shereen Dockery from [facility]     Doctor: Stevan    Reason for call:     Shereen is calling about a form \" letter requesting reasonable accommodation\" it was dropped   Off the 5/2/24, form was left with Dr Calles to complete.  Shereen is requesting the original copy of the letter, patient still has to sign it, it must be hand delivered to [facility] [employer].  She would like a call to  the letter.    Call back#: [phone], Shereen  "

## 2024-05-08 NOTE — TELEPHONE ENCOUNTER
Attempted to call Shereen back with no response, left voicemail.     They could come to office to have form printed from media where is was scanned. We could also print the letter than Dr Calles wrote for patient at that visit as well.

## 2024-05-09 ENCOUNTER — PATIENT OUTREACH (OUTPATIENT)
Dept: OBGYN CLINIC | Facility: HOSPITAL | Age: 49
End: 2024-05-09

## 2024-05-09 NOTE — TELEPHONE ENCOUNTER
Pt's , Madison, came into the office today 5/9 requesting the landlord letter for pt. I stated the landlord letter has not been signed yet. Stated  or Demetra are no longer in the office for the rest of the day.  stated pt will be evicted in 3 weeks without this letter being signed.

## 2024-05-09 NOTE — TELEPHONE ENCOUNTER
Caller: Madison    Doctor: Stevan    Reason for call: Madison stated she was in the office to  the letter from Dr. Calles and was informed it was not signed. What she needed was a letter from Dr. Calles, not the original form she provided to this office. I mentioned to Madison that there was a letter from Dr. Calles in the  patient's chart and she asked me to fax that to her. No further assistance needed.    Fax: [phone]

## 2024-05-09 NOTE — TELEPHONE ENCOUNTER
Caller: Madison-    Doctor: Dr. Calles    Reason for call: Madison asking if the letters that were discussed earlier in previous messages are printed so she can .  She is on her way now.     Call back#: [phone]

## 2024-05-09 NOTE — PROGRESS NOTES
[facility] made third attempt to reach pts counselor Crystal Dickson. I was unable to reach her and a message was left to please return [facility] call. [facility] will remain available.

## 2024-05-11 DIAGNOSIS — G57.21 FEMORAL NERVE PALSY, RIGHT: ICD-10-CM

## 2024-05-11 DIAGNOSIS — Z96.641 STATUS POST TOTAL HIP REPLACEMENT, RIGHT: ICD-10-CM

## 2024-05-13 RX ORDER — PSEUDOEPHED/ACETAMINOPH/DIPHEN 30MG-500MG
TABLET ORAL
Qty: 180 TABLET | Refills: 1 | Status: SHIPPED | OUTPATIENT
Start: 2024-05-13

## 2024-05-17 ENCOUNTER — PATIENT OUTREACH (OUTPATIENT)
Dept: OBGYN CLINIC | Facility: HOSPITAL | Age: 49
End: 2024-05-17

## 2024-05-17 ENCOUNTER — TELEPHONE (OUTPATIENT)
Age: 49
End: 2024-05-17

## 2024-05-17 NOTE — TELEPHONE ENCOUNTER
Caller: nathanael Morillo    Doctor: Na    Reason for call: pt called stating no one has called to r/s her consult appt with Dr. Monzon    Call back#: [phone]

## 2024-05-17 NOTE — TELEPHONE ENCOUNTER
Caller: nathanael Morillo    Doctor: Stu    Reason for call: pt is having issues with the pregabalin causing her to urinate a lot.  Pt would like a nurse to call her    Call back#: [phone]

## 2024-05-17 NOTE — PROGRESS NOTES
"SWCM contacted pt today to follow up after recent referral due to concerns about MH. Pt is following with a counselor and psychiatrist. Pt also has a  assigned to her Shereen Dockery. Pt reports no HI or SI and that she is continuing her OP MH services. Pt does have a HX of self harm behaviors. Pt shares today that she is going to be starting with pain management ( per pt they cancelled her first appt after she had already arrived) Pt plans to reschedule. Pt today shared that her pain mixed with lack of sleeping with her MH issues feels likes \"mud.\" Pt is aware that at her last appt with her surgeon did not end well because per pt when she has pain and unable to sleep, she just feels angry. Pt is aware she needs to continue to follow OP MH, and per pt all of her OP MH providers are aware of her current situation.  Pt today shared that she has five weeks to find a new apartment, because her lease is up. Pt does have section 8, but she needs to find an apartment in her price range that accepts two service dogs. Pt states that all this stress has caused her to be on edge. Pt also shares that her truck is having issues and she is unsure if the truck can be fixed. Pt did share today her housing  is helping her find a new apartment. SWCM and pt discussed resources, and pt has contacted [employer] for housing help. Pt also shares she also cannot be in [place] due to previous drug history. Pt does not to be in an area that has any possibility of tempting her.   At this time, pt plans too follow up with pain management. Per pt they are having a team meeting to discuss a plan with the team prior to appt with pt. Pt will also continue to working with  in regard to housing. [facility] will remain available to assist pt as needed.   "

## 2024-05-20 NOTE — TELEPHONE ENCOUNTER
Spoke with patient. She is upset and feels as though she is not getting help. She reports that lyrica is causing urinary frequency. I explained that urinary changes are not a commonly seen side effect of lyrica and we would want to consider other causes of urinary issues. Patient states she read online lyrica can cause this and she would like to wean down on lyrica. I asked that she call me to let me know how much lyrica she has left so we can decrease her dosage.     We also discussed her issues with her pain management appt. Patient frustrated that she was not seen. I explained that there was a note placed in her chart this morning requesting her records from [facility] for review prior to her appt with Dr Monzon. I sent her a [employer] message with fax and phone numbers for pain management.

## 2024-06-05 ENCOUNTER — TELEPHONE (OUTPATIENT)
Dept: OBGYN CLINIC | Facility: MEDICAL CENTER | Age: 49
End: 2024-06-05

## 2024-06-06 NOTE — TELEPHONE ENCOUNTER
Called & spoke to patient's  letting her know that form had been filled out and faxed back to Kiara Gresham @ [phone]

## 2024-06-13 ENCOUNTER — PROCEDURE VISIT (OUTPATIENT)
Dept: OBGYN CLINIC | Facility: MEDICAL CENTER | Age: 49
End: 2024-06-13
Payer: MEDICARE

## 2024-06-13 VITALS
WEIGHT: 161 LBS | DIASTOLIC BLOOD PRESSURE: 68 MMHG | HEIGHT: 59 IN | HEART RATE: 90 BPM | SYSTOLIC BLOOD PRESSURE: 101 MMHG | BODY MASS INDEX: 32.46 KG/M2

## 2024-06-13 DIAGNOSIS — M25.561 CHRONIC PAIN OF RIGHT KNEE: ICD-10-CM

## 2024-06-13 DIAGNOSIS — G89.29 CHRONIC PAIN OF RIGHT KNEE: ICD-10-CM

## 2024-06-13 DIAGNOSIS — Z96.641 STATUS POST TOTAL HIP REPLACEMENT, RIGHT: ICD-10-CM

## 2024-06-13 DIAGNOSIS — G58.9 NERVE PALSY: ICD-10-CM

## 2024-06-13 DIAGNOSIS — M17.11 PRIMARY OSTEOARTHRITIS OF RIGHT KNEE: Primary | ICD-10-CM

## 2024-06-13 PROCEDURE — 20610 DRAIN/INJ JOINT/BURSA W/O US: CPT | Performed by: ORTHOPAEDIC SURGERY

## 2024-06-13 RX ORDER — PREGABALIN 300 MG/1
300 CAPSULE ORAL DAILY
Qty: 30 CAPSULE | Refills: 2 | Status: SHIPPED | OUTPATIENT
Start: 2024-06-13
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
## 2024-06-25 ENCOUNTER — HOSPITAL ENCOUNTER (EMERGENCY)
Facility: HOSPITAL | Age: 49
Discharge: HOME | End: 2024-06-25
Attending: EMERGENCY MEDICINE
Payer: MEDICARE

## 2024-06-25 ENCOUNTER — APPOINTMENT (EMERGENCY)
Dept: RADIOLOGY | Facility: HOSPITAL | Age: 49
End: 2024-06-25
Payer: MEDICARE

## 2024-06-25 VITALS
RESPIRATION RATE: 16 BRPM | WEIGHT: 165 LBS | BODY MASS INDEX: 33.33 KG/M2 | HEART RATE: 96 BPM | OXYGEN SATURATION: 96 % | TEMPERATURE: 98.1 F | DIASTOLIC BLOOD PRESSURE: 88 MMHG | SYSTOLIC BLOOD PRESSURE: 132 MMHG

## 2024-06-25 DIAGNOSIS — M25.40 JOINT EFFUSION: ICD-10-CM

## 2024-06-25 DIAGNOSIS — M25.561 ACUTE PAIN OF RIGHT KNEE: Primary | ICD-10-CM

## 2024-06-25 DIAGNOSIS — S80.00XA KNEE CONTUSION: ICD-10-CM

## 2024-06-25 PROCEDURE — 99284 EMERGENCY DEPT VISIT MOD MDM: CPT

## 2024-06-25 PROCEDURE — 73564 X-RAY EXAM KNEE 4 OR MORE: CPT

## 2024-06-25 PROCEDURE — 99283 EMERGENCY DEPT VISIT LOW MDM: CPT

## 2024-06-25 RX ORDER — OXYCODONE HYDROCHLORIDE 5 MG/1
5 TABLET ORAL EVERY 4 HOURS PRN
Qty: 5 TABLET | Refills: 0 | Status: SHIPPED | OUTPATIENT
Start: 2024-06-25 | End: 2024-06-30

## 2024-06-25 RX ORDER — OXYCODONE HYDROCHLORIDE 5 MG/1
5 TABLET ORAL ONCE
Status: COMPLETED | OUTPATIENT
Start: 2024-06-25 | End: 2024-06-25

## 2024-06-25 RX ADMIN — OXYCODONE 5 MG: 5 TABLET ORAL at 13:26

## 2024-06-25 NOTE — Clinical Note
Ilia Kearns was seen and treated in our emergency department on 6/25/2024.                Diagnosis: Knee contusion    Thera  may return to work on return date.    She may return on this date: 06/27/2024         If you have any questions or concerns, please don't hesitate to call.      Mannie Cummings PA-C    ______________________________           _______________          _______________  Hospital Representative                              Date                                Time

## 2024-06-25 NOTE — DISCHARGE INSTRUCTIONS
Patient advised to follow-up with PCP for today's ED visit.  Patient vies return to ED with any worsening symptoms explained on discharge.  Patient advised to take prescribed medication as prescribed.  Patient advised to follow-up with orthopedics ambulatory referral placed.  Continue wearing knee brace.  Use crutches over the next few days to help with ambulation.
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
## 2024-06-28 ENCOUNTER — OFFICE VISIT (OUTPATIENT)
Dept: OBGYN CLINIC | Facility: MEDICAL CENTER | Age: 49
End: 2024-06-28
Payer: MEDICARE

## 2024-06-28 VITALS
DIASTOLIC BLOOD PRESSURE: 82 MMHG | HEIGHT: 59 IN | HEART RATE: 84 BPM | BODY MASS INDEX: 33.26 KG/M2 | SYSTOLIC BLOOD PRESSURE: 120 MMHG | WEIGHT: 165 LBS

## 2024-06-28 DIAGNOSIS — M16.11 PRIMARY OSTEOARTHRITIS OF ONE HIP, RIGHT: ICD-10-CM

## 2024-06-28 DIAGNOSIS — Z01.818 PRE-OP EXAMINATION: ICD-10-CM

## 2024-06-28 DIAGNOSIS — Z01.818 PREOPERATIVE TESTING: ICD-10-CM

## 2024-06-28 DIAGNOSIS — G57.21 FEMORAL NEUROPATHY OF RIGHT LOWER EXTREMITY: ICD-10-CM

## 2024-06-28 DIAGNOSIS — M17.11 PRIMARY OSTEOARTHRITIS OF RIGHT KNEE: Primary | ICD-10-CM

## 2024-06-28 DIAGNOSIS — M25.361 INSTABILITY OF RIGHT KNEE JOINT: ICD-10-CM

## 2024-06-28 PROCEDURE — 99213 OFFICE O/P EST LOW 20 MIN: CPT | Performed by: PHYSICIAN ASSISTANT
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
## 2024-06-30 RX ORDER — MULTIVITAMIN
1 TABLET ORAL DAILY
Qty: 30 TABLET | Refills: 2 | Status: SHIPPED | OUTPATIENT
Start: 2024-06-30

## 2024-06-30 RX ORDER — FOLIC ACID 1 MG/1
1000 TABLET ORAL DAILY
Qty: 30 TABLET | Refills: 4 | Status: SHIPPED | OUTPATIENT
Start: 2024-06-30

## 2024-07-05 ENCOUNTER — HOSPITAL ENCOUNTER (EMERGENCY)
Facility: HOSPITAL | Age: 49
Discharge: HOME | End: 2024-07-05
Attending: EMERGENCY MEDICINE
Payer: MEDICARE

## 2024-07-05 ENCOUNTER — APPOINTMENT (EMERGENCY)
Dept: RADIOLOGY | Facility: HOSPITAL | Age: 49
End: 2024-07-05
Payer: MEDICARE

## 2024-07-05 VITALS
TEMPERATURE: 98.4 F | DIASTOLIC BLOOD PRESSURE: 110 MMHG | HEART RATE: 78 BPM | RESPIRATION RATE: 18 BRPM | OXYGEN SATURATION: 98 % | SYSTOLIC BLOOD PRESSURE: 196 MMHG

## 2024-07-05 DIAGNOSIS — S52.121A FRACTURE OF RADIAL HEAD, RIGHT, CLOSED: Primary | ICD-10-CM
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
## 2024-07-05 DIAGNOSIS — S50.311A ABRASION OF RIGHT ELBOW: ICD-10-CM

## 2024-07-05 PROCEDURE — 99283 EMERGENCY DEPT VISIT LOW MDM: CPT

## 2024-07-05 PROCEDURE — 73090 X-RAY EXAM OF FOREARM: CPT

## 2024-07-05 PROCEDURE — 73080 X-RAY EXAM OF ELBOW: CPT

## 2024-07-05 PROCEDURE — 96372 THER/PROPH/DIAG INJ SC/IM: CPT

## 2024-07-05 PROCEDURE — 99284 EMERGENCY DEPT VISIT MOD MDM: CPT | Performed by: EMERGENCY MEDICINE

## 2024-07-05 PROCEDURE — 90715 TDAP VACCINE 7 YRS/> IM: CPT | Performed by: EMERGENCY MEDICINE

## 2024-07-05 PROCEDURE — 90471 IMMUNIZATION ADMIN: CPT

## 2024-07-05 RX ORDER — GINSENG 100 MG
1 CAPSULE ORAL ONCE
Status: COMPLETED | OUTPATIENT
Start: 2024-07-05 | End: 2024-07-05

## 2024-07-05 RX ORDER — KETOROLAC TROMETHAMINE 30 MG/ML
30 INJECTION, SOLUTION INTRAMUSCULAR; INTRAVENOUS ONCE
Status: COMPLETED | OUTPATIENT
Start: 2024-07-05 | End: 2024-07-05

## 2024-07-05 RX ORDER — OXYCODONE HYDROCHLORIDE 10 MG/1
10 TABLET ORAL ONCE
Status: COMPLETED | OUTPATIENT
Start: 2024-07-05 | End: 2024-07-05

## 2024-07-05 RX ORDER — ACETAMINOPHEN 500 MG
1000 TABLET ORAL EVERY 6 HOURS PRN
Qty: 60 TABLET | Refills: 0 | Status: SHIPPED | OUTPATIENT
Start: 2024-07-05

## 2024-07-05 RX ORDER — IBUPROFEN 600 MG/1
600 TABLET ORAL EVERY 6 HOURS PRN
Qty: 30 TABLET | Refills: 0 | Status: SHIPPED | OUTPATIENT
Start: 2024-07-05

## 2024-07-05 RX ORDER — OXYCODONE HYDROCHLORIDE 5 MG/1
5 TABLET ORAL EVERY 6 HOURS PRN
Qty: 12 TABLET | Refills: 0 | Status: SHIPPED | OUTPATIENT
Start: 2024-07-05 | End: 2024-07-15

## 2024-07-05 RX ADMIN — TETANUS TOXOID, REDUCED DIPHTHERIA TOXOID AND ACELLULAR PERTUSSIS VACCINE, ADSORBED 0.5 ML: 5; 2.5; 8; 8; 2.5 SUSPENSION INTRAMUSCULAR at 21:22

## 2024-07-05 RX ADMIN — BACITRACIN ZINC 1 SMALL APPLICATION: 500 OINTMENT TOPICAL at 21:24

## 2024-07-05 RX ADMIN — KETOROLAC TROMETHAMINE 30 MG: 30 INJECTION, SOLUTION INTRAMUSCULAR; INTRAVENOUS at 21:26

## 2024-07-05 RX ADMIN — OXYCODONE HYDROCHLORIDE 10 MG: 10 TABLET ORAL at 22:31
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
## 2024-07-08 ENCOUNTER — TELEPHONE (OUTPATIENT)
Age: 49
End: 2024-07-08
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
## 2024-07-08 NOTE — TELEPHONE ENCOUNTER
Caller: PATIENT    Doctor: Stevan    Reason for call: patient states they fell twice and fractured their elbow. Patient wants to speak to Dr Calles BEFORE scheduling with someone because she wants a recommendation. Patient also wants to know if they should come in sooner to see Dr Calles for the knee.     Call back#: [phone]

## 2024-07-09 NOTE — TELEPHONE ENCOUNTER
Caller:     Doctor: Stevan      Reason for call: Asked if she can be seen early in the day @ [phone]     Call back#: [phone]

## 2024-07-09 NOTE — TELEPHONE ENCOUNTER
Dre, please call patient and see if she can do 9:45 on Thursday 7/11? We are very booked up for the morning so I think that is the earliest I can do for her. Please ok with patient then add to schedule. Thanks!

## 2024-07-11 ENCOUNTER — APPOINTMENT (OUTPATIENT)
Dept: RADIOLOGY | Facility: MEDICAL CENTER | Age: 49
End: 2024-07-11
Payer: MEDICARE

## 2024-07-11 ENCOUNTER — OFFICE VISIT (OUTPATIENT)
Dept: OBGYN CLINIC | Facility: MEDICAL CENTER | Age: 49
End: 2024-07-11
Payer: MEDICARE

## 2024-07-11 VITALS — HEIGHT: 59 IN | WEIGHT: 164 LBS | BODY MASS INDEX: 33.06 KG/M2

## 2024-07-11 DIAGNOSIS — M25.521 PAIN IN RIGHT ELBOW: Primary | ICD-10-CM

## 2024-07-11 DIAGNOSIS — M17.11 PRIMARY OSTEOARTHRITIS OF RIGHT KNEE: ICD-10-CM

## 2024-07-11 DIAGNOSIS — S52.124A CLOSED NONDISPLACED FRACTURE OF HEAD OF RIGHT RADIUS, INITIAL ENCOUNTER: ICD-10-CM

## 2024-07-11 DIAGNOSIS — M25.571 ACUTE RIGHT ANKLE PAIN: ICD-10-CM

## 2024-07-11 DIAGNOSIS — M25.561 ACUTE PAIN OF RIGHT KNEE: ICD-10-CM

## 2024-07-11 DIAGNOSIS — G58.9 NERVE PALSY: ICD-10-CM

## 2024-07-11 DIAGNOSIS — M25.521 PAIN IN RIGHT ELBOW: ICD-10-CM

## 2024-07-11 DIAGNOSIS — S52.121A FRACTURE OF RADIAL HEAD, RIGHT, CLOSED: ICD-10-CM

## 2024-07-11 DIAGNOSIS — S80.02XA CONTUSION OF LEFT KNEE, INITIAL ENCOUNTER: ICD-10-CM

## 2024-07-11 DIAGNOSIS — Z96.641 STATUS POST TOTAL HIP REPLACEMENT, RIGHT: ICD-10-CM

## 2024-07-11 PROCEDURE — 73080 X-RAY EXAM OF ELBOW: CPT

## 2024-07-11 PROCEDURE — 99214 OFFICE O/P EST MOD 30 MIN: CPT | Performed by: ORTHOPAEDIC SURGERY

## 2024-07-11 RX ORDER — OXYCODONE HYDROCHLORIDE 5 MG/1
5 TABLET ORAL DAILY PRN
Qty: 5 TABLET | Refills: 0 | Status: CANCELLED | OUTPATIENT
Start: 2024-07-11

## 2024-07-11 NOTE — TELEPHONE ENCOUNTER
Patient stated she was in office today and this was to be called in, pharmacy never received request    Reason for call:   [x] Refill   [] Prior Auth  [] Other:     Office:   [] PCP/Provider -   [x] Specialty/Provider - jordan Calles,     Medication: oxyCODONE (Roxicodone) 5 immediate release tablet     Dose/Frequency: 5 mg, Oral, Every 6 hours PRN     Quantity: 12    Pharmacy: [facility] - [facility] - [address] [place] [address]     Does the patient have enough for 3 days?   [] Yes   [x] No - Send as HP to POD

## 2024-07-12 RX ORDER — OXYCODONE HYDROCHLORIDE 5 MG/1
5 TABLET ORAL DAILY PRN
Qty: 7 TABLET | Refills: 0 | Status: SHIPPED | OUTPATIENT
Start: 2024-07-12 | End: 2024-07-18

## 2024-07-12 RX ORDER — OXYCODONE HYDROCHLORIDE 5 MG/1
5 TABLET ORAL EVERY 6 HOURS PRN
Qty: 12 TABLET | Refills: 0 | OUTPATIENT
Start: 2024-07-12 | End: 2024-07-22

## 2024-07-12 NOTE — TELEPHONE ENCOUNTER
Patient called back inquiring about refill of oxycodone. Patient states is in a lot of pain and does not want to go the whole weekend without the medication Patient is requesting medication to be refilled as soon as possible.

## 2024-07-12 NOTE — TELEPHONE ENCOUNTER
Patient called in again looking for an update on script. Patients pharmacy is only open until 6pm tonight and they are only open Saturday until noon. She just took her last pill and is worried about the pain she will have if she does not get script.

## 2024-07-12 NOTE — TELEPHONE ENCOUNTER
Pt called again this morning as pharmacy has not yet received the Rx for her pain medication since yesterday office visit. (July 11)  She is in a lot of pain and really needs her medication.  She is unable to drive and the pharmacy delivery service will bring this to her.     Patient stated she was in office today and this was to be called in, pharmacy never received request

## 2024-07-17 ENCOUNTER — TELEPHONE (OUTPATIENT)
Age: 49
End: 2024-07-17

## 2024-07-17 NOTE — TELEPHONE ENCOUNTER
Called and spoke with pt she states the splint is twisted, she will try to send pictures/video through [employer]. Since the splint is twisted it is causing her increased pain in her wrist and more in her forearm which she states is 7/10. She had a sling and it ripped and she cannot afford to get a new one at this time. She is taking her celebrex and tylenol. She is taking 1 oxycodone per day because she was only given 7 but she could use it during the day more often and only has 3 left. Pt was crying due to the pain and frustration over the splint being twisted. She has financial concerns because she is on disability and is having a hard time affording rides to the office. She states she is wearing the knee brace but the falls are because of her ankle giving way and she is getting a brace for that on Friday. Please review and advise. Thanks!

## 2024-07-17 NOTE — TELEPHONE ENCOUNTER
Caller: Patient     Doctor: Dr. Calles    Reason for call: Patient calling stating that her splint was initially at 90 degrees but it is now almost fully straight.  She fell on 7/13 and 7/15.  She does have f/u on 7/19/24.  She just wanted to let Dr. Calles know.  She is calling asking for advisement as to what she should do in regard to the splint and increased pain in the elbow.      Call back#: [phone]

## 2024-07-17 NOTE — TELEPHONE ENCOUNTER
Called & spoke to patient. She says she cannot come in tomorrow because she has no ride. She doesn't even believe she has a ride for Friday's appt. Splint was intact when she left the office last visit. She doesn't know how it became undone. She doesn't know what do know. Expressed frustration.

## 2024-07-17 NOTE — TELEPHONE ENCOUNTER
Kirstin, please let patient know she should try to maintain the splint as best she can until her follow up appt. We will examine it on Friday and determine if she can come out of it. Please encourage her again to wear the knee brace locked in extension to help prevent falls as we have continued to encourage her to do. Please let me know if any further issues. Thanks!

## 2024-07-18 ENCOUNTER — TELEPHONE (OUTPATIENT)
Age: 49
End: 2024-07-18

## 2024-07-18 ENCOUNTER — APPOINTMENT (EMERGENCY)
Dept: RADIOLOGY | Facility: HOSPITAL | Age: 49
End: 2024-07-18
Payer: MEDICARE

## 2024-07-18 ENCOUNTER — HOSPITAL ENCOUNTER (EMERGENCY)
Facility: HOSPITAL | Age: 49
Discharge: HOME | End: 2024-07-18
Attending: EMERGENCY MEDICINE
Payer: MEDICARE

## 2024-07-18 VITALS
RESPIRATION RATE: 18 BRPM | SYSTOLIC BLOOD PRESSURE: 142 MMHG | WEIGHT: 167 LBS | HEART RATE: 82 BPM | OXYGEN SATURATION: 92 % | DIASTOLIC BLOOD PRESSURE: 82 MMHG | TEMPERATURE: 97.2 F | BODY MASS INDEX: 33.73 KG/M2
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
## 2024-07-18 DIAGNOSIS — M79.601 RIGHT ARM PAIN: ICD-10-CM

## 2024-07-18 PROCEDURE — 96372 THER/PROPH/DIAG INJ SC/IM: CPT

## 2024-07-18 PROCEDURE — 99285 EMERGENCY DEPT VISIT HI MDM: CPT | Performed by: PHYSICIAN ASSISTANT

## 2024-07-18 PROCEDURE — 73070 X-RAY EXAM OF ELBOW: CPT

## 2024-07-18 PROCEDURE — 99284 EMERGENCY DEPT VISIT MOD MDM: CPT

## 2024-07-18 PROCEDURE — 73110 X-RAY EXAM OF WRIST: CPT

## 2024-07-18 PROCEDURE — 73090 X-RAY EXAM OF FOREARM: CPT

## 2024-07-18 RX ORDER — OXYCODONE HYDROCHLORIDE AND ACETAMINOPHEN 5; 325 MG/1; MG/1
1 TABLET ORAL EVERY 6 HOURS PRN
Qty: 10 TABLET | Refills: 0 | Status: SHIPPED | OUTPATIENT
Start: 2024-07-18

## 2024-07-18 RX ORDER — MORPHINE SULFATE 4 MG/ML
4 INJECTION, SOLUTION INTRAMUSCULAR; INTRAVENOUS ONCE
Status: COMPLETED | OUTPATIENT
Start: 2024-07-18 | End: 2024-07-18

## 2024-07-18 RX ADMIN — MORPHINE SULFATE 4 MG: 4 INJECTION INTRAVENOUS at 13:06

## 2024-07-18 NOTE — TELEPHONE ENCOUNTER
Noted will follow up. Message sent to  regarding previous messages that patient does not have transportation to office visits.
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
## 2024-07-18 NOTE — DISCHARGE INSTRUCTIONS
Please refer to the attached information for strict return instructions. If symptoms worsen or new symptoms develop please return to the ER. Keep sling in place, please follow up with Dr. Calles for re-evaluation.

## 2024-07-18 NOTE — TELEPHONE ENCOUNTER
Caller: Patient     Doctor: Stevan     Reason for call: Patient was knocked over by a dog last night and hurt her elbow. Patient is on her way to the hospital   Just an FYI   Call back#: [phone]   Consent signed for ct with contrast.     Kali Britt RN  03/20/18 [id number]

## 2024-07-24 ENCOUNTER — TELEPHONE (OUTPATIENT)
Age: 49
End: 2024-07-24

## 2024-07-24 NOTE — TELEPHONE ENCOUNTER
Reason for call:   [x] Refill   [] Prior Auth  [x] Other: Pt called states she doesn't know if she should schedule an appt or can she just get a rx for pain meds. Please let pt know. Pharmacy is listed below. Please and thank you.     Office:   [] PCP/Provider -   [x] Specialty/Provider -   Lurdes Calles, DO  Orthopedic Surgery-[place]    Medication: oxycodone      Pharmacy: [facility] - [facility] [facility] - [address] [place] [address]      Does the patient have enough for 3 days?   [] Yes   [x] No - Send as HP to POD

## 2024-07-24 NOTE — TELEPHONE ENCOUNTER
Spoke with patient. She states she was given 10 tablets of oxycodone at the ED on 7/18 but needs more. I explained that Dr. Calles had been willing to provide opioid pain management for 2 weeks after her injury. We provided an oxycodone script on 7/12 then she got a second script from the ED on 7/18. I will not provide another refill of oxycodone at this point. I advised that she continue the celebrex, tylenol, and lyrica. Encouraged patient to wear the sling as needed for pain but to do gentle elbow ROM at least twice daily to prevent stiffness. Patient displeased with my recommendations and ended the call.

## 2024-07-29 ENCOUNTER — PATIENT OUTREACH (OUTPATIENT)
Dept: OBGYN CLINIC | Facility: HOSPITAL | Age: 49
End: 2024-07-29

## 2024-07-29 DIAGNOSIS — Z78.9 NEED FOR FOLLOW-UP BY SOCIAL WORKER: Primary | ICD-10-CM

## 2024-07-29 NOTE — PROGRESS NOTES
[facility] received a request to contact pt to discuss social issues. On 07/18/2024 pt was seen in ED due to fall causing pt tp fall onto right arm. Pt expressed the fall was due to a dog that was on a chain lunging at her.   [facility] inquired where pts  is from that is listed in the chart, Shereen dockery @ [phone]. Per pt today she is pts  from [facility]. Per pt Shereen helps her do paperwork etc. Pt also needs to move in September and  is helping her look for new housing. Pts landlord has decided to not renew pt lease. The rent has gone up and pt unable to afford. Pt does have subsidized housing. Pt also shared today that her PCP is trying to help her obtain HHC services. Pt has an appt August 7th, she is unsure with who, but it is someone that is going to help her obtain HHC services. Per pt she believes it is through her insurance.   MELINDA inquired about pts transportation. Pt does have [employer] services, but per pt the Van gives her anxiety so she chooses to miss appts rather then take [place]. The only form of transportation pts insurance offers is [place]. Pt has also been given permission to take her service dog on the van. REBECCA and pt discussed importance of making her medical appts. [facility] will attempt to reach pt .   MELINDA then inquired if pt has been keeping her MH appts, pt has therapy 2 x a week and psychiatrist every other month.   After call with pt, MELINDA attempted to reach . Pt gave verbal permission for me to contact . [facility] was unable to reach Shereen Dockery today and a message was left to please return [facility] call. MELINDA will remain available.

## 2024-08-14 ENCOUNTER — PATIENT OUTREACH (OUTPATIENT)
Dept: OBGYN CLINIC | Facility: HOSPITAL | Age: 49
End: 2024-08-14

## 2024-08-15 ENCOUNTER — PATIENT OUTREACH (OUTPATIENT)
Dept: OBGYN CLINIC | Facility: HOSPITAL | Age: 49
End: 2024-08-15

## 2024-08-15 ENCOUNTER — OFFICE VISIT (OUTPATIENT)
Dept: OBGYN CLINIC | Facility: MEDICAL CENTER | Age: 49
End: 2024-08-15
Payer: MEDICARE

## 2024-08-15 ENCOUNTER — APPOINTMENT (OUTPATIENT)
Dept: RADIOLOGY | Facility: MEDICAL CENTER | Age: 49
End: 2024-08-15
Payer: MEDICARE

## 2024-08-15 ENCOUNTER — TELEPHONE (OUTPATIENT)
Age: 49
End: 2024-08-15

## 2024-08-15 VITALS
DIASTOLIC BLOOD PRESSURE: 81 MMHG | WEIGHT: 167 LBS | BODY MASS INDEX: 33.67 KG/M2 | HEIGHT: 59 IN | SYSTOLIC BLOOD PRESSURE: 113 MMHG | HEART RATE: 99 BPM

## 2024-08-15 DIAGNOSIS — G89.29 CHRONIC LOW BACK PAIN, UNSPECIFIED BACK PAIN LATERALITY, UNSPECIFIED WHETHER SCIATICA PRESENT: ICD-10-CM

## 2024-08-15 DIAGNOSIS — S52.121D CLOSED DISPLACED FRACTURE OF HEAD OF RIGHT RADIUS WITH ROUTINE HEALING, SUBSEQUENT ENCOUNTER: ICD-10-CM

## 2024-08-15 DIAGNOSIS — S52.121D CLOSED DISPLACED FRACTURE OF HEAD OF RIGHT RADIUS WITH ROUTINE HEALING, SUBSEQUENT ENCOUNTER: Primary | ICD-10-CM

## 2024-08-15 DIAGNOSIS — M54.50 CHRONIC LOW BACK PAIN, UNSPECIFIED BACK PAIN LATERALITY, UNSPECIFIED WHETHER SCIATICA PRESENT: ICD-10-CM

## 2024-08-15 DIAGNOSIS — G58.9 NERVE PALSY: ICD-10-CM

## 2024-08-15 DIAGNOSIS — Z96.641 STATUS POST TOTAL HIP REPLACEMENT, RIGHT: ICD-10-CM

## 2024-08-15 DIAGNOSIS — M17.11 PRIMARY OSTEOARTHRITIS OF RIGHT KNEE: ICD-10-CM

## 2024-08-15 PROCEDURE — 20610 DRAIN/INJ JOINT/BURSA W/O US: CPT | Performed by: ORTHOPAEDIC SURGERY

## 2024-08-15 PROCEDURE — 99024 POSTOP FOLLOW-UP VISIT: CPT | Performed by: ORTHOPAEDIC SURGERY

## 2024-08-15 PROCEDURE — 73080 X-RAY EXAM OF ELBOW: CPT

## 2024-08-15 RX ADMIN — BUPIVACAINE HYDROCHLORIDE 2 ML: 2.5 INJECTION, SOLUTION INFILTRATION; PERINEURAL at 09:00

## 2024-08-15 RX ADMIN — TRIAMCINOLONE ACETONIDE 40 MG: 40 INJECTION, SUSPENSION INTRA-ARTICULAR; INTRAMUSCULAR at 09:00
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
## 2024-08-15 NOTE — PROGRESS NOTES
[facility] received a message that pt was trying to reach [facility]. [facility] returned pt call and pt was very upset and emotional. Pt had an appt today with orthopedic doctor. Pt was educated that pain management declined pt for services because they feel pt is seeking narcotics (per pt). Pt is stating that she is in pain and is unsure where to go from her without narcotics. Pt did share that pt was given a shot in the knee to help with pain relief.   Pt was seeing pain management previously and was taking Vicodin and Celebrex. Per pt she had a urine test and because the Vicodin was not in her system they cancelled her narcotic agreement because they believed pt selling the narcotics because it was not her system. Pt is still prescribed Celebrex.   Pt does have an appt with her PCP 08/20/2024 via video. Per pt she has talked to her PCP about the possibility of taking suboxone to jeb the pain. Per pt she was nervous about taking suboxone, but she will now will consider same. Pt to talk her PCP on 08/20/2024. [facility] will continue to support and remain available. Pt reports no SI or HI or any plans on harming herself.

## 2024-08-20 RX ORDER — TRIAMCINOLONE ACETONIDE 40 MG/ML
40 INJECTION, SUSPENSION INTRA-ARTICULAR; INTRAMUSCULAR
Status: COMPLETED | OUTPATIENT
Start: 2024-08-15 | End: 2024-08-15

## 2024-08-20 RX ORDER — BUPIVACAINE HYDROCHLORIDE 2.5 MG/ML
2 INJECTION, SOLUTION INFILTRATION; PERINEURAL
Status: COMPLETED | OUTPATIENT
Start: 2024-08-15 | End: 2024-08-15

## 2024-09-04 ENCOUNTER — PATIENT OUTREACH (OUTPATIENT)
Dept: OBGYN CLINIC | Facility: HOSPITAL | Age: 49
End: 2024-09-04

## 2024-09-04 NOTE — PROGRESS NOTES
MELINDACM attempted to reach pt today for follow up. I was unable to reach pt today and a message was left to please return [facility] call. SWCM will remain available.

## 2024-09-28 DIAGNOSIS — Z01.818 PRE-OP EXAMINATION: ICD-10-CM

## 2024-09-28 DIAGNOSIS — M16.11 PRIMARY OSTEOARTHRITIS OF ONE HIP, RIGHT: ICD-10-CM

## 2024-09-28 DIAGNOSIS — Z01.818 PREOPERATIVE TESTING: ICD-10-CM

## 2024-09-28 RX ORDER — MULTIVITAMIN
1 TABLET ORAL DAILY
Qty: 30 TABLET | Refills: 1 | Status: SHIPPED | OUTPATIENT
Start: 2024-09-28

## 2024-10-03 ENCOUNTER — PATIENT OUTREACH (OUTPATIENT)
Dept: OBGYN CLINIC | Facility: HOSPITAL | Age: 49
End: 2024-10-03

## 2024-10-03 NOTE — PROGRESS NOTES
[facility] contacted pt today to follow up. [facility] was able to reach pt. Pt shared she and her counselor went to [facility] today to inquire about suboxone for pain relief and pt was advised that that office does not use suboxone for pain management. Pt was also advised that suboxone would interfere with her MH medications. Pt does not wish to purse Suboxone. [facility] inquired when her next ortho appt is and pt does no have on scheduled. Pt unsure what can be done in regard to pain management at this time. Pt will follow up with her medical doctor in regard to same.  Pt is receiving her MH services and pt has a  assigned to her also. Per pt today, her  is helping her apply for [employer] [facility]. At this time, pt will continue to work with her MH provider, , and medical doctor. There are no other  needs. [facility] will close referral and remain available as needed.

## 2024-10-17 ENCOUNTER — HOSPITAL ENCOUNTER (OUTPATIENT)
Dept: NEUROLOGY | Facility: CLINIC | Age: 49
End: 2024-10-17
Payer: MEDICARE

## 2024-10-17 DIAGNOSIS — G58.9 NERVE PALSY: ICD-10-CM

## 2024-10-17 PROCEDURE — 95886 MUSC TEST DONE W/N TEST COMP: CPT | Performed by: PSYCHIATRY & NEUROLOGY

## 2024-10-17 PROCEDURE — 95911 NRV CNDJ TEST 9-10 STUDIES: CPT | Performed by: PSYCHIATRY & NEUROLOGY

## 2024-11-20 ENCOUNTER — TELEPHONE (OUTPATIENT)
Dept: OBGYN CLINIC | Facility: MEDICAL CENTER | Age: 49
End: 2024-11-20

## 2024-11-20 NOTE — TELEPHONE ENCOUNTER
I called Ms. Kearns.  We talked about the results of the EMG test and how she is feeling.  She is still having he same issues from the femoral nerve palsy.  We talked about her elbow, knee, and ankle as well.  Her elbow is feeling better.  She has some pain in her knee and knows she can call for a steroid injection if needed.  She would like to follow up with podiatry for her ankle.  I let her know the name of the doctor she saw on 5/7/24.  She will call him for an appointment.  I let her know I am always available to see her to discuss these issues further.  She expressed that she has some difficulty with transportation.  I let her know she could also call us if needed.  She verbalized understanding.

## 2024-11-21 DIAGNOSIS — Z01.818 PREOPERATIVE TESTING: ICD-10-CM

## 2024-11-21 DIAGNOSIS — Z01.818 PRE-OP EXAMINATION: ICD-10-CM

## 2024-11-21 DIAGNOSIS — M16.11 PRIMARY OSTEOARTHRITIS OF ONE HIP, RIGHT: ICD-10-CM

## 2024-11-21 RX ORDER — FOLIC ACID 1 MG/1
1000 TABLET ORAL DAILY
Qty: 30 TABLET | Refills: 3 | Status: SHIPPED | OUTPATIENT
Start: 2024-11-21

## 2024-12-26 ENCOUNTER — APPOINTMENT (OUTPATIENT)
Dept: RADIOLOGY | Facility: MEDICAL CENTER | Age: 49
End: 2024-12-26
Payer: COMMERCIAL

## 2024-12-26 ENCOUNTER — OFFICE VISIT (OUTPATIENT)
Dept: URGENT CARE | Facility: MEDICAL CENTER | Age: 49
End: 2024-12-26
Payer: COMMERCIAL

## 2024-12-26 VITALS
HEIGHT: 59 IN | OXYGEN SATURATION: 96 % | BODY MASS INDEX: 32.25 KG/M2 | RESPIRATION RATE: 18 BRPM | HEART RATE: 93 BPM | WEIGHT: 160 LBS | TEMPERATURE: 98.6 F | SYSTOLIC BLOOD PRESSURE: 134 MMHG | DIASTOLIC BLOOD PRESSURE: 90 MMHG

## 2024-12-26 DIAGNOSIS — M25.471 RIGHT ANKLE SWELLING: Primary | ICD-10-CM

## 2024-12-26 PROCEDURE — G0382 LEV 3 HOSP TYPE B ED VISIT: HCPCS

## 2024-12-26 PROCEDURE — 73610 X-RAY EXAM OF ANKLE: CPT

## 2024-12-26 PROCEDURE — 99283 EMERGENCY DEPT VISIT LOW MDM: CPT

## 2024-12-26 RX ORDER — BUPRENORPHINE 8 MG/1
8 TABLET SUBLINGUAL EVERY 24 HOURS
COMMUNITY
Start: 2024-12-26

## 2024-12-26 NOTE — PATIENT INSTRUCTIONS
Wear the brace on your right foot  You can take Tylenol in addition to your Celebrex    While you are not wearing the brace keep your right foot elevated while in bed use a pillow    Follow-up with the podiatrist  Your x-ray today was negative for any bony abnormalities
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
## 2025-01-02 ENCOUNTER — TELEPHONE (OUTPATIENT)
Age: 50
End: 2025-01-02

## 2025-01-02 NOTE — TELEPHONE ENCOUNTER
Caller: MARY A     Doctor and/or Office: Dr Hicks/ Sylvie JUARES#: [phone]     Escalation: Care Her  called in and stated it is very hard for her to go get transportation and that she had a xray done on 12/26 and stated her ankle is still very swelled.  She would like to know if there is any testing she needs to get done like a MRI if she could have it done before the appt on 1/22 would be very appreciative since getting transportation is very hard.  Please advise thank you.

## 2025-01-22 ENCOUNTER — OFFICE VISIT (OUTPATIENT)
Dept: PODIATRY | Facility: CLINIC | Age: 50
End: 2025-01-22
Payer: COMMERCIAL

## 2025-01-22 VITALS — WEIGHT: 156.4 LBS | HEIGHT: 59 IN | BODY MASS INDEX: 31.53 KG/M2

## 2025-01-22 DIAGNOSIS — M19.071 DJD (DEGENERATIVE JOINT DISEASE), ANKLE AND FOOT, RIGHT: Primary | ICD-10-CM

## 2025-01-22 PROCEDURE — 99213 OFFICE O/P EST LOW 20 MIN: CPT | Performed by: PODIATRIST

## 2025-01-22 NOTE — PROGRESS NOTES
Name: Ilia Kearns      : 1975      MRN: [id number]  Encounter Provider: Dre Hicks DPM  Encounter Date: 2025   Encounter department: [facility] [place]    Assessment & Plan     1. DJD (degenerative joint disease), ankle and foot, right  -     Ambulatory Referral to Orthopedic Surgery; Future  -     CT lower extremity wo contrast right; Future; Expected date: 2025    Discussed with patient she has ankle joint arthritis with talar tilt she has had previous surgical intervention completed in [place].    Patient continues to have issues with instability of the ankle and developing worsening arthritic changes within the ankle joint.    Given advanced progression, I will order CT scan as well as have her evaluated and discussion made for ankle joint replacement versus fusion.    Will refer her to Dr. Lachman in our group who performs this procedure.        No follow-ups on file.    Subjective     Patient was seen in urgent care on 2024.  She has history of twisting her ankle in September.  Ankle continues to be painful despite wearing a brace and taking pain medication.  Patient had x-ray of her right ankle.    Patient does have history of surgical repair back in the 90s of the ankle anchors in the ER.  Patient does have a history of femoral neuropathy.  X-ray of the right ankle was reviewed there are some degenerative changes slightly worse than previous exam.  There is mild tilt of the talus with widening of the medial clear space.  Was not wearing brace when she had the injury and continued to have pain and discomfort to the area.          Constitutional:  Negative for chills and fever.   Respiratory:  Negative for chest tightness and shortness of breath.    Gastrointestinal:  Negative for nausea and vomiting.     Current Outpatient Medications on File Prior to Visit   Medication Sig   • amLODIPine (NORVASC) 5 mg tablet Take 5 mg by mouth   •  amphetamine-dextroamphetamine (ADDERALL) 20 mg tablet Take 20 mg by mouth 2 (two) times a day   • amphetamine-dextroamphetamine (ADDERALL) 5 MG tablet    • ascorbic acid (VITAMIN C) 500 MG tablet Take 1 tablet (500 mg total) by mouth daily   • benztropine (COGENTIN) 1 mg tablet Take 1 mg by mouth daily at bedtime   • buprenorphine (SUBUTEX) 8 mg 8 mg every 24 hours   • celecoxib (CeleBREX) 200 mg capsule Take 1 capsule (200 mg total) by mouth 2 (two) times a day   • docusate sodium (COLACE) 100 mg capsule Take 1 capsule (100 mg total) by mouth 2 (two) times a day   • doxepin (SINEquan) 100 mg capsule Take 100 mg by mouth daily at bedtime   • haloperidol (HALDOL) 10 mg tablet 10 mg 5-10 Mg TID as needed; Currently taking 10 mg in afternoon and bedtime   • haloperidol (HALDOL) 5 mg tablet    • ibuprofen (MOTRIN) 600 mg tablet Take 1 tablet (600 mg total) by mouth every 6 (six) hours as needed for moderate pain   • lidocaine (LIDODERM) 5 %    • lidocaine (XYLOCAINE) 5 % ointment Apply 1 application topically 4 (four) times a day as needed (moderate pain not relieved by acetaminophen, for hand)   • liothyronine (CYTOMEL) 5 mcg tablet Take 2 tablets (10 mcg total) by mouth daily   • loratadine (CLARITIN) 10 mg tablet Take 1 tablet (10 mg total) by mouth daily at bedtime   • Multiple Vitamin (multivitamin) tablet TAKE ONE TABLET BY MOUTH ONCE DAILY   • mupirocin (BACTROBAN) 2 % ointment Apply topically daily Do not start before December 13, 2022.   • NON FORMULARY in the morning Medical Marijuana   • ondansetron (ZOFRAN-ODT) 4 mg disintegrating tablet Take 4 mg by mouth every 6 (six) hours   • potassium chloride (K-DUR,KLOR-CON) 10 mEq tablet Take 1 tablet (10 mEq total) by mouth 2 (two) times a day   • prazosin (MINIPRESS) 5 mg capsule Take 2 capsules (10 mg total) by mouth daily at bedtime   • prazosin (MINIPRESS) 5 mg capsule Take 1 capsule (5 mg total) by mouth daily Do not start before December 13, 2022.   •  simvastatin (ZOCOR) 20 mg tablet Take 20 mg by mouth daily at bedtime With dinner   • Stimulant Laxative 8.6-50 MG per tablet Take 2 tablets by mouth 2 (two) times a day   • tiZANidine (ZANAFLEX) 4 mg tablet Take 1 tablet (4 mg total) by mouth every 6 (six) hours as needed for muscle spasms   • Trintellix 10 MG tablet Take 10 mg by mouth 2 (two) times a day   • acetaminophen (TYLENOL) 500 mg tablet Take 2 tablets (1,000 mg total) by mouth every 8 (eight) hours (Patient not taking: Reported on 1/22/2025)   • acetaminophen (TYLENOL) 500 mg tablet Take 2 tablets (1,000 mg total) by mouth every 6 (six) hours as needed for moderate pain (Patient not taking: Reported on 1/22/2025)   • Acetaminophen Extra Strength 500 MG TABS TAKE TWO TABLETS BY MOUTH EVERY EIGHT HOURS AS NEEDED FOR MILD PAIN (Patient not taking: Reported on 1/22/2025)   • aspirin (ECOTRIN) 325 mg EC tablet TAKE ONE TABLET BY MOUTH TWICE A DAY WITH FOOD (Patient not taking: Reported on 1/22/2025)   • diazepam (VALIUM) 5 mg tablet Take 1 tablet (5 mg total) by mouth 3 (three) times a day as needed for muscle spasms (for muscle spasms only, please offer Atarax for anxiety) for up to 10 days   • ferrous sulfate 324 (65 Fe) mg Take 1 tablet (324 mg total) by mouth daily (Patient not taking: Reported on 1/22/2025)   • fluPHENAZine (PROLIXIN) 5 mg tablet Take 5 mg by mouth daily at bedtime   • folic acid (FOLVITE) 1 mg tablet TAKE ONE TABLET BY MOUTH ONCE DAILY (Patient not taking: Reported on 1/22/2025)   • guaiFENesin (MUCINEX) 600 mg 12 hr tablet TAKE ONE TABLET BY MOUTH TWICE A DAY AS NEEDED FOR COUGH OR CONGESTION   • montelukast (SINGULAIR) 10 mg tablet Take 1 tablet (10 mg total) by mouth daily at bedtime   • naloxone (NARCAN) 4 mg/0.1 mL nasal spray Administer 1 spray into a nostril. If no response after 2-3 minutes, give another dose in the other nostril using a new spray.   • oxyCODONE-acetaminophen (Percocet) 5-325 mg per tablet Take 1 tablet by mouth  "every 6 (six) hours as needed for moderate pain or severe pain for up to 10 doses Max Daily Amount: 4 tablets (Patient not taking: Reported on 1/22/2025)   • pregabalin (LYRICA) 300 MG capsule Take 1 capsule (300 mg total) by mouth daily (Patient not taking: Reported on 1/22/2025)   • promethazine (PHENERGAN) 12.5 MG tablet Take 1 tablet (12.5 mg total) by mouth every 6 (six) hours as needed for nausea or vomiting (Patient not taking: Reported on 1/22/2025)       Objective     Ht 4' 11\" (1.499 m)   Wt 70.9 kg (156 lb 6.4 oz)   BMI 31.59 kg/m²     Vascular: Intact pedal pulses bilateral DP and PT.  Neurological: Gross protective sensation intact bilateral  Musculoskeletal: Muscle strength bilateral intact with dorsiflexion, inversion, eversion and plantarflexion.  Tenderness ankle joint medially and laterally.  There is some swelling noted medially and laterally.  Tenderness on palpation noted to the peroneal tendons laterally as well as anteriorly at the lateral collateral ligaments.  Tenderness on anterior drawer testing with instability noted as well on talar tilt testing.  There is some tenderness noted on palpation at the level of the medial malleolus and posterior tibial tendon and medial ankle joint line.  Dermatological: No open lesions or ulcerations noted bilateral.    "

## 2025-02-20 ENCOUNTER — HOSPITAL ENCOUNTER (OUTPATIENT)
Dept: CT IMAGING | Facility: HOSPITAL | Age: 50
End: 2025-02-20
Attending: PODIATRIST
Payer: COMMERCIAL

## 2025-02-20 DIAGNOSIS — M19.071 DJD (DEGENERATIVE JOINT DISEASE), ANKLE AND FOOT, RIGHT: ICD-10-CM

## 2025-02-20 PROCEDURE — 73700 CT LOWER EXTREMITY W/O DYE: CPT

## 2025-02-21 DIAGNOSIS — Z01.818 PREOPERATIVE TESTING: ICD-10-CM

## 2025-02-21 DIAGNOSIS — M16.11 PRIMARY OSTEOARTHRITIS OF ONE HIP, RIGHT: ICD-10-CM

## 2025-02-21 DIAGNOSIS — Z01.818 PRE-OP EXAMINATION: ICD-10-CM

## 2025-02-21 RX ORDER — FOLIC ACID 1 MG/1
1000 TABLET ORAL DAILY
Qty: 30 TABLET | Refills: 2 | OUTPATIENT
Start: 2025-02-21

## 2025-02-25 ENCOUNTER — RESULTS FOLLOW-UP (OUTPATIENT)
Dept: WOUND CARE | Facility: CLINIC | Age: 50
End: 2025-02-25

## 2025-02-25 NOTE — RESULT ENCOUNTER NOTE
Patient was referred to Dr. Lachman as she was asking about joint replacement.  Please check with her to make sure she knows how to get in touch with them.

## 2025-03-03 DIAGNOSIS — Z01.818 PRE-OP EXAMINATION: ICD-10-CM

## 2025-03-03 DIAGNOSIS — Z01.818 PREOPERATIVE TESTING: ICD-10-CM

## 2025-03-03 DIAGNOSIS — M16.11 PRIMARY OSTEOARTHRITIS OF ONE HIP, RIGHT: ICD-10-CM

## 2025-03-04 RX ORDER — FOLIC ACID 1 MG/1
1000 TABLET ORAL DAILY
Qty: 30 TABLET | Refills: 2 | OUTPATIENT
Start: 2025-03-04

## 2025-03-04 NOTE — TELEPHONE ENCOUNTER
Dre Lubin MA to Lurdes Calles, DO   SO    2/21/25  3:33 PM  Note      Please deny Rx as this was a pre-op med from past surgery 11/29/2023

## 2025-03-18 ENCOUNTER — APPOINTMENT (EMERGENCY)
Dept: CT IMAGING | Facility: HOSPITAL | Age: 50
End: 2025-03-18
Payer: COMMERCIAL

## 2025-03-18 ENCOUNTER — HOSPITAL ENCOUNTER (EMERGENCY)
Facility: HOSPITAL | Age: 50
Discharge: HOME | End: 2025-03-18
Attending: EMERGENCY MEDICINE | Admitting: EMERGENCY MEDICINE
Payer: COMMERCIAL

## 2025-03-18 ENCOUNTER — APPOINTMENT (EMERGENCY)
Dept: RADIOLOGY | Facility: HOSPITAL | Age: 50
End: 2025-03-18
Payer: COMMERCIAL

## 2025-03-18 VITALS
TEMPERATURE: 98.5 F | SYSTOLIC BLOOD PRESSURE: 130 MMHG | HEART RATE: 87 BPM | RESPIRATION RATE: 18 BRPM | DIASTOLIC BLOOD PRESSURE: 80 MMHG | OXYGEN SATURATION: 98 % | WEIGHT: 145 LBS | BODY MASS INDEX: 29.29 KG/M2

## 2025-03-18 DIAGNOSIS — M25.552 LEFT HIP PAIN: ICD-10-CM

## 2025-03-18 DIAGNOSIS — R10.9 ABDOMINAL PAIN: Primary | ICD-10-CM

## 2025-03-18 LAB
ALBUMIN SERPL BCG-MCNC: 4.3 G/DL (ref 3.5–5)
ALP SERPL-CCNC: 61 U/L (ref 34–104)
ALT SERPL W P-5'-P-CCNC: 12 U/L (ref 7–52)
ANION GAP SERPL CALCULATED.3IONS-SCNC: 4 MMOL/L (ref 4–13)
AST SERPL W P-5'-P-CCNC: 17 U/L (ref 13–39)
BASOPHILS # BLD AUTO: 0.02 THOUSANDS/ÂΜL (ref 0–0.1)
BASOPHILS NFR BLD AUTO: 0 % (ref 0–1)
BILIRUB SERPL-MCNC: 0.24 MG/DL (ref 0.2–1)
BUN SERPL-MCNC: 13 MG/DL (ref 5–25)
CALCIUM SERPL-MCNC: 9.4 MG/DL (ref 8.4–10.2)
CHLORIDE SERPL-SCNC: 108 MMOL/L (ref 96–108)
CO2 SERPL-SCNC: 25 MMOL/L (ref 21–32)
CREAT SERPL-MCNC: 0.54 MG/DL (ref 0.6–1.3)
EOSINOPHIL # BLD AUTO: 0.07 THOUSAND/ÂΜL (ref 0–0.61)
EOSINOPHIL NFR BLD AUTO: 1 % (ref 0–6)
ERYTHROCYTE [DISTWIDTH] IN BLOOD BY AUTOMATED COUNT: 11.9 % (ref 11.6–15.1)
GFR SERPL CREATININE-BSD FRML MDRD: 109 ML/MIN/1.73SQ M
GLUCOSE SERPL-MCNC: 89 MG/DL (ref 65–140)
GLUCOSE SERPL-MCNC: 89 MG/DL (ref 65–140)
HCT VFR BLD AUTO: 39.5 % (ref 34.8–46.1)
HGB BLD-MCNC: 13.3 G/DL (ref 11.5–15.4)
IMM GRANULOCYTES # BLD AUTO: 0.01 THOUSAND/UL (ref 0–0.2)
IMM GRANULOCYTES NFR BLD AUTO: 0 % (ref 0–2)
LIPASE SERPL-CCNC: 45 U/L (ref 11–82)
LYMPHOCYTES # BLD AUTO: 1.97 THOUSANDS/ÂΜL (ref 0.6–4.47)
LYMPHOCYTES NFR BLD AUTO: 26 % (ref 14–44)
MCH RBC QN AUTO: 31.6 PG (ref 26.8–34.3)
MCHC RBC AUTO-ENTMCNC: 33.7 G/DL (ref 31.4–37.4)
MCV RBC AUTO: 94 FL (ref 82–98)
MONOCYTES # BLD AUTO: 0.73 THOUSAND/ÂΜL (ref 0.17–1.22)
MONOCYTES NFR BLD AUTO: 10 % (ref 4–12)
NEUTROPHILS # BLD AUTO: 4.71 THOUSANDS/ÂΜL (ref 1.85–7.62)
NEUTS SEG NFR BLD AUTO: 63 % (ref 43–75)
NRBC BLD AUTO-RTO: 0 /100 WBCS
PLATELET # BLD AUTO: 293 THOUSANDS/UL (ref 149–390)
PMV BLD AUTO: 8.6 FL (ref 8.9–12.7)
POTASSIUM SERPL-SCNC: 4.1 MMOL/L (ref 3.5–5.3)
PROT SERPL-MCNC: 6.9 G/DL (ref 6.4–8.4)
RBC # BLD AUTO: 4.21 MILLION/UL (ref 3.81–5.12)
SODIUM SERPL-SCNC: 137 MMOL/L (ref 135–147)
WBC # BLD AUTO: 7.51 THOUSAND/UL (ref 4.31–10.16)

## 2025-03-18 PROCEDURE — 99284 EMERGENCY DEPT VISIT MOD MDM: CPT

## 2025-03-18 PROCEDURE — 85025 COMPLETE CBC W/AUTO DIFF WBC: CPT | Performed by: PHYSICIAN ASSISTANT

## 2025-03-18 PROCEDURE — 74177 CT ABD & PELVIS W/CONTRAST: CPT

## 2025-03-18 PROCEDURE — 83690 ASSAY OF LIPASE: CPT | Performed by: PHYSICIAN ASSISTANT

## 2025-03-18 PROCEDURE — 80053 COMPREHEN METABOLIC PANEL: CPT | Performed by: PHYSICIAN ASSISTANT

## 2025-03-18 PROCEDURE — 96361 HYDRATE IV INFUSION ADD-ON: CPT

## 2025-03-18 PROCEDURE — 73502 X-RAY EXAM HIP UNI 2-3 VIEWS: CPT

## 2025-03-18 PROCEDURE — 36415 COLL VENOUS BLD VENIPUNCTURE: CPT | Performed by: PHYSICIAN ASSISTANT

## 2025-03-18 PROCEDURE — 96374 THER/PROPH/DIAG INJ IV PUSH: CPT

## 2025-03-18 PROCEDURE — 82948 REAGENT STRIP/BLOOD GLUCOSE: CPT

## 2025-03-18 PROCEDURE — 96375 TX/PRO/DX INJ NEW DRUG ADDON: CPT

## 2025-03-18 PROCEDURE — 99284 EMERGENCY DEPT VISIT MOD MDM: CPT | Performed by: PHYSICIAN ASSISTANT

## 2025-03-18 RX ORDER — MAGNESIUM HYDROXIDE/ALUMINUM HYDROXICE/SIMETHICONE 120; 1200; 1200 MG/30ML; MG/30ML; MG/30ML
30 SUSPENSION ORAL ONCE
Status: COMPLETED | OUTPATIENT
Start: 2025-03-18 | End: 2025-03-18

## 2025-03-18 RX ORDER — MORPHINE SULFATE 4 MG/ML
4 INJECTION, SOLUTION INTRAMUSCULAR; INTRAVENOUS ONCE
Status: COMPLETED | OUTPATIENT
Start: 2025-03-18 | End: 2025-03-18

## 2025-03-18 RX ORDER — LIDOCAINE HYDROCHLORIDE 20 MG/ML
15 SOLUTION OROPHARYNGEAL ONCE
Status: COMPLETED | OUTPATIENT
Start: 2025-03-18 | End: 2025-03-18

## 2025-03-18 RX ORDER — KETOROLAC TROMETHAMINE 30 MG/ML
15 INJECTION, SOLUTION INTRAMUSCULAR; INTRAVENOUS ONCE
Status: DISCONTINUED | OUTPATIENT
Start: 2025-03-18 | End: 2025-03-18 | Stop reason: HOSPADM

## 2025-03-18 RX ORDER — ONDANSETRON 4 MG/1
4 TABLET, ORALLY DISINTEGRATING ORAL EVERY 6 HOURS PRN
Qty: 20 TABLET | Refills: 0 | Status: SHIPPED | OUTPATIENT
Start: 2025-03-18

## 2025-03-18 RX ORDER — ONDANSETRON 2 MG/ML
4 INJECTION INTRAMUSCULAR; INTRAVENOUS ONCE
Status: COMPLETED | OUTPATIENT
Start: 2025-03-18 | End: 2025-03-18

## 2025-03-18 RX ORDER — FAMOTIDINE 20 MG/1
20 TABLET, FILM COATED ORAL 2 TIMES DAILY
Qty: 14 TABLET | Refills: 0 | Status: SHIPPED | OUTPATIENT
Start: 2025-03-18 | End: 2025-03-25

## 2025-03-18 RX ORDER — SUCRALFATE 1 G/1
1 TABLET ORAL 4 TIMES DAILY
Qty: 28 TABLET | Refills: 0 | Status: SHIPPED | OUTPATIENT
Start: 2025-03-18 | End: 2025-03-25

## 2025-03-18 RX ORDER — FAMOTIDINE 20 MG/1
20 TABLET, FILM COATED ORAL ONCE
Status: COMPLETED | OUTPATIENT
Start: 2025-03-18 | End: 2025-03-18

## 2025-03-18 RX ADMIN — SODIUM CHLORIDE 1000 ML: 0.9 INJECTION, SOLUTION INTRAVENOUS at 16:26

## 2025-03-18 RX ADMIN — ONDANSETRON 4 MG: 2 INJECTION INTRAMUSCULAR; INTRAVENOUS at 19:24

## 2025-03-18 RX ADMIN — FAMOTIDINE 20 MG: 20 TABLET, FILM COATED ORAL at 19:11

## 2025-03-18 RX ADMIN — ALUMINUM HYDROXIDE, MAGNESIUM HYDROXIDE, AND SIMETHICONE 30 ML: 200; 200; 20 SUSPENSION ORAL at 19:11

## 2025-03-18 RX ADMIN — LIDOCAINE HYDROCHLORIDE 15 ML: 20 SOLUTION ORAL at 19:11

## 2025-03-18 RX ADMIN — MORPHINE SULFATE 4 MG: 4 INJECTION INTRAVENOUS at 16:26

## 2025-03-18 RX ADMIN — IOHEXOL 100 ML: 350 INJECTION, SOLUTION INTRAVENOUS at 18:33

## 2025-03-18 NOTE — DISCHARGE INSTRUCTIONS
Please refer to the attached information for strict return instructions. If symptoms worsen or new symptoms develop please return to the ER. Please follow up with gastroenterology as scheduled.

## 2025-03-18 NOTE — ED PROVIDER NOTES
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 procedures on file.  ED SEPSIS DOCUMENTATION   Time reflects when diagnosis was documented in both MDM as applicable and the Disposition within this note       Time User Action Codes Description Comment    3/18/2025  7:31 PM Josue Sanchez [R10.9] Abdominal pain     3/18/2025  7:31 PM Josue Sanchez [M25.552] Left hip pain                  Josue Sanchez PA-C  03/20/25 1006

## 2025-03-19 DIAGNOSIS — Z01.818 PREOPERATIVE TESTING: ICD-10-CM

## 2025-03-19 DIAGNOSIS — M16.11 PRIMARY OSTEOARTHRITIS OF ONE HIP, RIGHT: ICD-10-CM

## 2025-03-19 DIAGNOSIS — Z01.818 PRE-OP EXAMINATION: ICD-10-CM

## 2025-03-20 DIAGNOSIS — Z01.818 PREOPERATIVE TESTING: ICD-10-CM

## 2025-03-20 DIAGNOSIS — M16.11 PRIMARY OSTEOARTHRITIS OF ONE HIP, RIGHT: ICD-10-CM

## 2025-03-20 DIAGNOSIS — Z01.818 PRE-OP EXAMINATION: ICD-10-CM

## 2025-03-20 RX ORDER — FOLIC ACID 1 MG/1
1000 TABLET ORAL DAILY
Qty: 30 TABLET | Refills: 2 | OUTPATIENT
Start: 2025-03-20

## 2025-03-21 RX ORDER — FOLIC ACID 1 MG/1
1000 TABLET ORAL DAILY
Qty: 30 TABLET | Refills: 2 | OUTPATIENT
Start: 2025-03-21

## 2025-04-14 DIAGNOSIS — Z01.818 PREOPERATIVE TESTING: ICD-10-CM

## 2025-04-14 DIAGNOSIS — Z01.818 PRE-OP EXAMINATION: ICD-10-CM

## 2025-04-14 DIAGNOSIS — M16.11 PRIMARY OSTEOARTHRITIS OF ONE HIP, RIGHT: ICD-10-CM

## 2025-04-15 RX ORDER — FOLIC ACID 1 MG/1
1000 TABLET ORAL DAILY
Qty: 30 TABLET | Refills: 2 | OUTPATIENT
Start: 2025-04-15

## 2025-05-06 DIAGNOSIS — M16.11 PRIMARY OSTEOARTHRITIS OF ONE HIP, RIGHT: ICD-10-CM

## 2025-05-06 DIAGNOSIS — Z01.818 PRE-OP EXAMINATION: ICD-10-CM

## 2025-05-06 DIAGNOSIS — Z01.818 PREOPERATIVE TESTING: ICD-10-CM

## 2025-05-07 RX ORDER — FOLIC ACID 1 MG/1
1000 TABLET ORAL DAILY
Qty: 30 TABLET | Refills: 2 | OUTPATIENT
Start: 2025-05-07

## 2025-07-02 ENCOUNTER — TELEPHONE (OUTPATIENT)
Age: 50
End: 2025-07-02

## 2025-07-02 NOTE — TELEPHONE ENCOUNTER
Caller: Patient    Doctor:      Reason for call: Called to see if insurance would be PAR and [place] [employer] [facility] is [place]    Will call insurance to see if a ref is needed    Call back#:

## 2025-08-08 ENCOUNTER — HOSPITAL ENCOUNTER (EMERGENCY)
Facility: HOSPITAL | Age: 50
Discharge: HOME | End: 2025-08-08
Attending: EMERGENCY MEDICINE | Admitting: EMERGENCY MEDICINE
Payer: COMMERCIAL

## 2025-08-08 ENCOUNTER — APPOINTMENT (EMERGENCY)
Dept: CT IMAGING | Facility: HOSPITAL | Age: 50
End: 2025-08-08
Attending: EMERGENCY MEDICINE
Payer: COMMERCIAL

## 2025-08-08 VITALS
TEMPERATURE: 99.1 F | RESPIRATION RATE: 18 BRPM | HEART RATE: 93 BPM | OXYGEN SATURATION: 93 % | WEIGHT: 166 LBS | SYSTOLIC BLOOD PRESSURE: 159 MMHG | BODY MASS INDEX: 33.53 KG/M2 | DIASTOLIC BLOOD PRESSURE: 83 MMHG

## 2025-08-08 DIAGNOSIS — M54.9 BACK PAIN: ICD-10-CM
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
## 2025-08-08 LAB
ABO GROUP BLD: NORMAL
ALBUMIN SERPL BCG-MCNC: 3.9 G/DL (ref 3.5–5)
ALP SERPL-CCNC: 55 U/L (ref 34–104)
ALT SERPL W P-5'-P-CCNC: 17 U/L (ref 7–52)
ANION GAP SERPL CALCULATED.3IONS-SCNC: 7 MMOL/L (ref 4–13)
APTT PPP: 30 SECONDS (ref 23–34)
AST SERPL W P-5'-P-CCNC: 21 U/L (ref 13–39)
BASOPHILS # BLD AUTO: 0.05 THOUSANDS/ÂΜL (ref 0–0.1)
BASOPHILS NFR BLD AUTO: 1 % (ref 0–1)
BILIRUB DIRECT SERPL-MCNC: 0.01 MG/DL (ref 0–0.2)
BILIRUB SERPL-MCNC: 0.31 MG/DL (ref 0.2–1)
BLD GP AB SCN SERPL QL: NEGATIVE
BUN SERPL-MCNC: 24 MG/DL (ref 5–25)
CALCIUM SERPL-MCNC: 8.9 MG/DL (ref 8.4–10.2)
CHLORIDE SERPL-SCNC: 107 MMOL/L (ref 96–108)
CO2 SERPL-SCNC: 23 MMOL/L (ref 21–32)
CREAT SERPL-MCNC: 0.64 MG/DL (ref 0.6–1.3)
EOSINOPHIL # BLD AUTO: 0.11 THOUSAND/ÂΜL (ref 0–0.61)
EOSINOPHIL NFR BLD AUTO: 1 % (ref 0–6)
ERYTHROCYTE [DISTWIDTH] IN BLOOD BY AUTOMATED COUNT: 11.6 % (ref 11.6–15.1)
GFR SERPL CREATININE-BSD FRML MDRD: 103 ML/MIN/1.73SQ M
GLUCOSE SERPL-MCNC: 102 MG/DL (ref 65–140)
HCT VFR BLD AUTO: 41.7 % (ref 34.8–46.1)
HGB BLD-MCNC: 14.1 G/DL (ref 11.5–15.4)
IMM GRANULOCYTES # BLD AUTO: 0.04 THOUSAND/UL (ref 0–0.2)
IMM GRANULOCYTES NFR BLD AUTO: 0 % (ref 0–2)
INR PPP: 1 (ref 0.85–1.19)
LIPASE SERPL-CCNC: 22 U/L (ref 11–82)
LYMPHOCYTES # BLD AUTO: 2.38 THOUSANDS/ÂΜL (ref 0.6–4.47)
LYMPHOCYTES NFR BLD AUTO: 23 % (ref 14–44)
MAGNESIUM SERPL-MCNC: 2 MG/DL (ref 1.9–2.7)
MCH RBC QN AUTO: 32.1 PG (ref 26.8–34.3)
MCHC RBC AUTO-ENTMCNC: 33.8 G/DL (ref 31.4–37.4)
MCV RBC AUTO: 95 FL (ref 82–98)
MONOCYTES # BLD AUTO: 0.76 THOUSAND/ÂΜL (ref 0.17–1.22)
MONOCYTES NFR BLD AUTO: 7 % (ref 4–12)
NEUTROPHILS # BLD AUTO: 6.9 THOUSANDS/ÂΜL (ref 1.85–7.62)
NEUTS SEG NFR BLD AUTO: 68 % (ref 43–75)
NRBC BLD AUTO-RTO: 0 /100 WBCS
PLATELET # BLD AUTO: 248 THOUSANDS/UL (ref 149–390)
PMV BLD AUTO: 11.2 FL (ref 8.9–12.7)
POTASSIUM SERPL-SCNC: 3.9 MMOL/L (ref 3.5–5.3)
PROT SERPL-MCNC: 6.2 G/DL (ref 6.4–8.4)
PROTHROMBIN TIME: 13.4 SECONDS (ref 12.3–15)
RBC # BLD AUTO: 4.39 MILLION/UL (ref 3.81–5.12)
RH BLD: POSITIVE
SODIUM SERPL-SCNC: 137 MMOL/L (ref 135–147)
SPECIMEN EXPIRATION DATE: NORMAL
TSH SERPL DL<=0.05 MIU/L-ACNC: 1.43 UIU/ML (ref 0.45–4.5)
WBC # BLD AUTO: 10.24 THOUSAND/UL (ref 4.31–10.16)

## 2025-08-08 PROCEDURE — 36415 COLL VENOUS BLD VENIPUNCTURE: CPT | Performed by: EMERGENCY MEDICINE

## 2025-08-08 PROCEDURE — 85730 THROMBOPLASTIN TIME PARTIAL: CPT | Performed by: EMERGENCY MEDICINE

## 2025-08-08 PROCEDURE — 80076 HEPATIC FUNCTION PANEL: CPT | Performed by: EMERGENCY MEDICINE

## 2025-08-08 PROCEDURE — 86901 BLOOD TYPING SEROLOGIC RH(D): CPT | Performed by: EMERGENCY MEDICINE

## 2025-08-08 PROCEDURE — 85025 COMPLETE CBC W/AUTO DIFF WBC: CPT | Performed by: EMERGENCY MEDICINE

## 2025-08-08 PROCEDURE — 83735 ASSAY OF MAGNESIUM: CPT | Performed by: EMERGENCY MEDICINE

## 2025-08-08 PROCEDURE — 86850 RBC ANTIBODY SCREEN: CPT | Performed by: EMERGENCY MEDICINE

## 2025-08-08 PROCEDURE — 85610 PROTHROMBIN TIME: CPT | Performed by: EMERGENCY MEDICINE

## 2025-08-08 PROCEDURE — 71260 CT THORAX DX C+: CPT

## 2025-08-08 PROCEDURE — 99284 EMERGENCY DEPT VISIT MOD MDM: CPT

## 2025-08-08 PROCEDURE — 99285 EMERGENCY DEPT VISIT HI MDM: CPT | Performed by: EMERGENCY MEDICINE

## 2025-08-08 PROCEDURE — 96375 TX/PRO/DX INJ NEW DRUG ADDON: CPT

## 2025-08-08 PROCEDURE — 72125 CT NECK SPINE W/O DYE: CPT

## 2025-08-08 PROCEDURE — 96365 THER/PROPH/DIAG IV INF INIT: CPT

## 2025-08-08 PROCEDURE — 96376 TX/PRO/DX INJ SAME DRUG ADON: CPT

## 2025-08-08 PROCEDURE — 83690 ASSAY OF LIPASE: CPT | Performed by: EMERGENCY MEDICINE

## 2025-08-08 PROCEDURE — 74177 CT ABD & PELVIS W/CONTRAST: CPT

## 2025-08-08 PROCEDURE — 80048 BASIC METABOLIC PNL TOTAL CA: CPT | Performed by: EMERGENCY MEDICINE

## 2025-08-08 PROCEDURE — 70450 CT HEAD/BRAIN W/O DYE: CPT

## 2025-08-08 PROCEDURE — 86900 BLOOD TYPING SEROLOGIC ABO: CPT | Performed by: EMERGENCY MEDICINE

## 2025-08-08 PROCEDURE — 84443 ASSAY THYROID STIM HORMONE: CPT | Performed by: EMERGENCY MEDICINE

## 2025-08-08 RX ORDER — GINSENG 100 MG
1 CAPSULE ORAL ONCE
Status: COMPLETED | OUTPATIENT
Start: 2025-08-08 | End: 2025-08-08

## 2025-08-08 RX ORDER — OXYCODONE HYDROCHLORIDE 5 MG/1
5 TABLET ORAL EVERY 6 HOURS PRN
Qty: 8 TABLET | Refills: 0 | Status: SHIPPED | OUTPATIENT
Start: 2025-08-08 | End: 2025-08-16

## 2025-08-08 RX ORDER — HYDROMORPHONE HCL/PF 1 MG/ML
0.5 SYRINGE (ML) INJECTION ONCE
Refills: 0 | Status: COMPLETED | OUTPATIENT
Start: 2025-08-08 | End: 2025-08-08

## 2025-08-08 RX ORDER — HYDROMORPHONE HCL/PF 1 MG/ML
0.5 SYRINGE (ML) INJECTION ONCE
Status: COMPLETED | OUTPATIENT
Start: 2025-08-08 | End: 2025-08-08

## 2025-08-08 RX ORDER — ONDANSETRON 2 MG/ML
4 INJECTION INTRAMUSCULAR; INTRAVENOUS ONCE
Status: COMPLETED | OUTPATIENT
Start: 2025-08-08 | End: 2025-08-08

## 2025-08-08 RX ORDER — ACETAMINOPHEN 10 MG/ML
1000 INJECTION, SOLUTION INTRAVENOUS ONCE
Status: COMPLETED | OUTPATIENT
Start: 2025-08-08 | End: 2025-08-08

## 2025-08-08 RX ADMIN — Medication 1 APPLICATION: at 17:38

## 2025-08-08 RX ADMIN — ONDANSETRON 4 MG: 2 INJECTION INTRAMUSCULAR; INTRAVENOUS at 17:36

## 2025-08-08 RX ADMIN — HYDROMORPHONE HYDROCHLORIDE 0.5 MG: 1 INJECTION, SOLUTION INTRAMUSCULAR; INTRAVENOUS; SUBCUTANEOUS at 18:56

## 2025-08-08 RX ADMIN — HYDROMORPHONE HYDROCHLORIDE 0.5 MG: 1 INJECTION, SOLUTION INTRAMUSCULAR; INTRAVENOUS; SUBCUTANEOUS at 17:30

## 2025-08-08 RX ADMIN — IOHEXOL 100 ML: 350 INJECTION, SOLUTION INTRAVENOUS at 17:17

## 2025-08-08 RX ADMIN — SODIUM CHLORIDE 1000 ML: 0.9 INJECTION, SOLUTION INTRAVENOUS at 17:28

## 2025-08-08 RX ADMIN — BACITRACIN 1 SMALL APPLICATION: 500 OINTMENT TOPICAL at 18:59

## 2025-08-08 RX ADMIN — ACETAMINOPHEN 1000 MG: 10 INJECTION INTRAVENOUS at 17:32

## (undated) DEVICE — WEBRIL 6 IN UNSTERILE

## (undated) DEVICE — GLOVE SRG BIOGEL PI ORTHOPEDIC 7

## (undated) DEVICE — STERILE POLYISOPRENE POWDER-FREE SURGICAL GLOVES: Brand: PROTEXIS

## (undated) DEVICE — 450 ML BOTTLE OF 0.05% CHLORHEXIDINE GLUCONATE IN 99.95% STERILE WATER FOR IRRIGATION, USP AND APPLICATOR.: Brand: IRRISEPT ANTIMICROBIAL WOUND LAVAGE

## (undated) DEVICE — PAD GROUNDING ADULT

## (undated) DEVICE — HANDPIECE SET WITH HIGH FLOW TIP AND SUCTION TUBE: Brand: INTERPULSE

## (undated) DEVICE — EMERALD TOP SHEET DRAPE: Brand: CONVERTORS

## (undated) DEVICE — SURGICAL CLIPPER BLADE GENERAL USE

## (undated) DEVICE — NEPTUNE E-SEP SMOKE EVACUATION PENCIL, COATED, 70MM BLADE, PUSH BUTTON SWITCH: Brand: NEPTUNE E-SEP

## (undated) DEVICE — 3M™ IOBAN™ 2 ANTIMICROBIAL INCISE DRAPE 6648EZ: Brand: IOBAN™ 2

## (undated) DEVICE — BIPOLAR SEALER 23-301-1 AQM MBS: Brand: AQUAMANTYS™

## (undated) DEVICE — BETHLEHEM TOTAL HIP, KIT: Brand: CARDINAL HEALTH

## (undated) DEVICE — STERILE POLYISOPRENE POWDER-FREE SURGICAL GLOVES WITH EMOLLIENT COATING: Brand: PROTEXIS

## (undated) DEVICE — 4-PORT MANIFOLD: Brand: NEPTUNE 2

## (undated) DEVICE — 3M™ STERI-DRAPE™ U-DRAPE 1015: Brand: STERI-DRAPE™

## (undated) DEVICE — SUT VICRYL 2-0 CT-1 36 IN J945H

## (undated) DEVICE — HOOD: Brand: T7PLUS

## (undated) DEVICE — COBAN 6 IN STERILE

## (undated) DEVICE — ARTHROSCOPY FLOOR MAT

## (undated) DEVICE — DRAPE SHEET THREE QUARTER

## (undated) DEVICE — FRAZIER SUCTION INSTRUMENT 18 FR W/OBTURATOR, NO CONTROL VENT: Brand: FRAZIER

## (undated) DEVICE — SUT ETHIBOND 5 V-40 30 IN MB46G

## (undated) DEVICE — ADHESIVE SKIN HIGH VISCOSITY EXOFIN PRECISION PEN

## (undated) DEVICE — NEEDLE 18 G X 1 1/2

## (undated) DEVICE — GOWN,SLEEVE,STERILE,W/CSR WRAP,1/P: Brand: MEDLINE

## (undated) DEVICE — SYRINGE 30ML LL

## (undated) DEVICE — GLOVE SRG BIOGEL ORTHOPEDIC 6.5

## (undated) DEVICE — POSITIONER HANA TABLE PACK

## (undated) DEVICE — DRAPE C-ARM X-RAY

## (undated) DEVICE — SKIN MARKER DUAL TIP WITH RULER CAP, FLEXIBLE RULER AND LABELS: Brand: DEVON

## (undated) DEVICE — ELECTRODE BLADE E-Z CLEAN 6.5IN -0014

## (undated) DEVICE — CAPIT HIP COP -CMNT/POR-ACTIS

## (undated) DEVICE — DISPOSABLE OR TOWEL: Brand: CARDINAL HEALTH

## (undated) DEVICE — DRESSING MEPILEX AG BORDER POST-OP 4 X 8 IN

## (undated) DEVICE — SUT VICRYL 1 CTX 36 IN J977H

## (undated) DEVICE — 2108 SERIES SAGITTAL BLADE, GROUND (20.5 X 1.27 X 85.0MM)

## (undated) DEVICE — CHLORAPREP HI-LITE 26ML ORANGE

## (undated) DEVICE — SURGICAL GOWN, XL SMARTSLEEVE: Brand: CONVERTORS

## (undated) DEVICE — SUT MONOCRYL 3-0 PS-2 27 IN Y427H